# Patient Record
Sex: FEMALE | Race: BLACK OR AFRICAN AMERICAN | NOT HISPANIC OR LATINO | Employment: OTHER | URBAN - METROPOLITAN AREA
[De-identification: names, ages, dates, MRNs, and addresses within clinical notes are randomized per-mention and may not be internally consistent; named-entity substitution may affect disease eponyms.]

---

## 2018-02-19 ENCOUNTER — OFFICE VISIT CONVERTED (OUTPATIENT)
Dept: PULMONOLOGY | Facility: CLINIC | Age: 83
End: 2018-02-19
Attending: PHYSICIAN ASSISTANT

## 2018-03-01 ENCOUNTER — OFFICE VISIT CONVERTED (OUTPATIENT)
Dept: PULMONOLOGY | Facility: CLINIC | Age: 83
End: 2018-03-01
Attending: INTERNAL MEDICINE

## 2019-01-04 ENCOUNTER — OFFICE VISIT CONVERTED (OUTPATIENT)
Dept: PULMONOLOGY | Facility: CLINIC | Age: 84
End: 2019-01-04
Attending: INTERNAL MEDICINE

## 2019-03-04 ENCOUNTER — HOSPITAL ENCOUNTER (OUTPATIENT)
Dept: GENERAL RADIOLOGY | Facility: HOSPITAL | Age: 84
Discharge: HOME | End: 2019-03-04
Attending: INTERNAL MEDICINE

## 2019-08-29 ENCOUNTER — HOSPITAL ENCOUNTER (OUTPATIENT)
Dept: LAB | Facility: HOSPITAL | Age: 84
Discharge: HOME | End: 2019-08-29
Attending: INTERNAL MEDICINE

## 2019-08-29 ENCOUNTER — OFFICE VISIT CONVERTED (OUTPATIENT)
Dept: PULMONOLOGY | Facility: CLINIC | Age: 84
End: 2019-08-29
Attending: PHYSICIAN ASSISTANT

## 2019-08-29 LAB
ALBUMIN SERPL-MCNC: 4.3 G/DL (ref 3.5–5)
ALBUMIN/GLOB SERPL: 1.6 {RATIO} (ref 1.4–2.6)
ALP SERPL-CCNC: 78 U/L (ref 43–160)
ALT SERPL-CCNC: 10 U/L (ref 10–40)
ANION GAP SERPL CALC-SCNC: 19 MMOL/L (ref 8–19)
AST SERPL-CCNC: 12 U/L (ref 15–50)
BASOPHILS # BLD AUTO: 0.03 10*3/UL (ref 0–0.2)
BASOPHILS NFR BLD AUTO: 0.7 % (ref 0–3)
BILIRUB SERPL-MCNC: 0.62 MG/DL (ref 0.2–1.3)
BUN SERPL-MCNC: 22 MG/DL (ref 5–25)
BUN/CREAT SERPL: 33 {RATIO} (ref 6–20)
CALCIUM SERPL-MCNC: 9.7 MG/DL (ref 8.7–10.4)
CHLORIDE SERPL-SCNC: 103 MMOL/L (ref 99–111)
CHOLEST SERPL-MCNC: 145 MG/DL (ref 107–200)
CHOLEST/HDLC SERPL: 3.2 {RATIO} (ref 3–6)
CONV ABS IMM GRAN: 0.01 10*3/UL (ref 0–0.2)
CONV CO2: 25 MMOL/L (ref 22–32)
CONV CREATININE URINE, RANDOM: 73.4 MG/DL (ref 10–300)
CONV IMMATURE GRAN: 0.2 % (ref 0–1.8)
CONV MICROALBUM.,U,RANDOM: <12 MG/L (ref 0–20)
CONV TOTAL PROTEIN: 7 G/DL (ref 6.3–8.2)
CREAT UR-MCNC: 0.67 MG/DL (ref 0.5–0.9)
DEPRECATED RDW RBC AUTO: 50.6 FL (ref 36.4–46.3)
EOSINOPHIL # BLD AUTO: 0.18 10*3/UL (ref 0–0.7)
EOSINOPHIL # BLD AUTO: 4 % (ref 0–7)
ERYTHROCYTE [DISTWIDTH] IN BLOOD BY AUTOMATED COUNT: 14.3 % (ref 11.7–14.4)
EST. AVERAGE GLUCOSE BLD GHB EST-MCNC: 197 MG/DL
GFR SERPLBLD BASED ON 1.73 SQ M-ARVRAT: >60 ML/MIN/{1.73_M2}
GLOBULIN UR ELPH-MCNC: 2.7 G/DL (ref 2–3.5)
GLUCOSE SERPL-MCNC: 153 MG/DL (ref 65–99)
HBA1C MFR BLD: 8.5 % (ref 3.5–5.7)
HCT VFR BLD AUTO: 37 % (ref 37–47)
HDLC SERPL-MCNC: 46 MG/DL (ref 40–60)
HGB BLD-MCNC: 11.7 G/DL (ref 12–16)
LDLC SERPL CALC-MCNC: 83 MG/DL (ref 70–100)
LYMPHOCYTES # BLD AUTO: 1.13 10*3/UL (ref 1–5)
LYMPHOCYTES NFR BLD AUTO: 25.4 % (ref 20–45)
MCH RBC QN AUTO: 30.8 PG (ref 27–31)
MCHC RBC AUTO-ENTMCNC: 31.6 G/DL (ref 33–37)
MCV RBC AUTO: 97.4 FL (ref 81–99)
MICROALBUMIN/CREAT UR: 16.3 MG/G{CRE} (ref 0–35)
MONOCYTES # BLD AUTO: 0.58 10*3/UL (ref 0.2–1.2)
MONOCYTES NFR BLD AUTO: 13 % (ref 3–10)
NEUTROPHILS # BLD AUTO: 2.52 10*3/UL (ref 2–8)
NEUTROPHILS NFR BLD AUTO: 56.7 % (ref 30–85)
NRBC CBCN: 0 % (ref 0–0.7)
OSMOLALITY SERPL CALC.SUM OF ELEC: 302 MOSM/KG (ref 273–304)
PLATELET # BLD AUTO: 310 10*3/UL (ref 130–400)
PMV BLD AUTO: 9.9 FL (ref 9.4–12.3)
POTASSIUM SERPL-SCNC: 3.7 MMOL/L (ref 3.5–5.3)
RBC # BLD AUTO: 3.8 10*6/UL (ref 4.2–5.4)
SODIUM SERPL-SCNC: 143 MMOL/L (ref 135–147)
TRIGL SERPL-MCNC: 81 MG/DL (ref 40–150)
TSH SERPL-ACNC: 0.84 M[IU]/L (ref 0.27–4.2)
VLDLC SERPL-MCNC: 16 MG/DL (ref 5–37)
WBC # BLD AUTO: 4.45 10*3/UL (ref 4.8–10.8)

## 2019-11-11 ENCOUNTER — HOSPITAL ENCOUNTER (OUTPATIENT)
Dept: GENERAL RADIOLOGY | Facility: HOSPITAL | Age: 84
Discharge: HOME | End: 2019-11-11
Attending: INTERNAL MEDICINE

## 2019-12-12 ENCOUNTER — HOSPITAL ENCOUNTER (OUTPATIENT)
Dept: LAB | Facility: HOSPITAL | Age: 84
Discharge: HOME | End: 2019-12-12
Attending: INTERNAL MEDICINE

## 2019-12-12 LAB
CONV CREATININE URINE, RANDOM: 91.4 MG/DL (ref 10–300)
CONV MICROALBUM.,U,RANDOM: 21.8 MG/L (ref 0–20)
EST. AVERAGE GLUCOSE BLD GHB EST-MCNC: 177 MG/DL
HBA1C MFR BLD: 7.8 % (ref 3.5–5.7)
MICROALBUMIN/CREAT UR: 23.9 MG/G{CRE} (ref 0–35)

## 2020-01-08 ENCOUNTER — OFFICE VISIT CONVERTED (OUTPATIENT)
Dept: PULMONOLOGY | Facility: CLINIC | Age: 85
End: 2020-01-08
Attending: PHYSICIAN ASSISTANT

## 2020-06-11 ENCOUNTER — HOSPITAL ENCOUNTER (OUTPATIENT)
Dept: LAB | Facility: HOSPITAL | Age: 85
Discharge: HOME | End: 2020-06-11
Attending: INTERNAL MEDICINE

## 2020-06-11 LAB
ALBUMIN SERPL-MCNC: 4.4 G/DL (ref 3.5–5)
ALBUMIN/GLOB SERPL: 1.9 {RATIO} (ref 1.4–2.6)
ALP SERPL-CCNC: 74 U/L (ref 43–160)
ALT SERPL-CCNC: 10 U/L (ref 10–40)
ANION GAP SERPL CALC-SCNC: 14 MMOL/L (ref 8–19)
AST SERPL-CCNC: 12 U/L (ref 15–50)
BASOPHILS # BLD AUTO: 0.04 10*3/UL (ref 0–0.2)
BASOPHILS NFR BLD AUTO: 0.9 % (ref 0–3)
BILIRUB SERPL-MCNC: 0.19 MG/DL (ref 0.2–1.3)
BUN SERPL-MCNC: 18 MG/DL (ref 5–25)
BUN/CREAT SERPL: 31 {RATIO} (ref 6–20)
CALCIUM SERPL-MCNC: 9.5 MG/DL (ref 8.7–10.4)
CHLORIDE SERPL-SCNC: 105 MMOL/L (ref 99–111)
CHOLEST SERPL-MCNC: 139 MG/DL (ref 107–200)
CHOLEST/HDLC SERPL: 2.4 {RATIO} (ref 3–6)
CONV ABS IMM GRAN: 0.01 10*3/UL (ref 0–0.2)
CONV CO2: 25 MMOL/L (ref 22–32)
CONV CREATININE URINE, RANDOM: 99.3 MG/DL (ref 10–300)
CONV IMMATURE GRAN: 0.2 % (ref 0–1.8)
CONV MICROALBUM.,U,RANDOM: 25 MG/L (ref 0–20)
CONV TOTAL PROTEIN: 6.7 G/DL (ref 6.3–8.2)
CREAT UR-MCNC: 0.59 MG/DL (ref 0.5–0.9)
DEPRECATED RDW RBC AUTO: 54.3 FL (ref 36.4–46.3)
EOSINOPHIL # BLD AUTO: 0.22 10*3/UL (ref 0–0.7)
EOSINOPHIL # BLD AUTO: 5.1 % (ref 0–7)
ERYTHROCYTE [DISTWIDTH] IN BLOOD BY AUTOMATED COUNT: 15.7 % (ref 11.7–14.4)
ERYTHROCYTE [SEDIMENTATION RATE] IN BLOOD: 12 MM/H (ref 0–30)
EST. AVERAGE GLUCOSE BLD GHB EST-MCNC: 160 MG/DL
GFR SERPLBLD BASED ON 1.73 SQ M-ARVRAT: >60 ML/MIN/{1.73_M2}
GLOBULIN UR ELPH-MCNC: 2.3 G/DL (ref 2–3.5)
GLUCOSE SERPL-MCNC: 114 MG/DL (ref 65–99)
HBA1C MFR BLD: 7.2 % (ref 3.5–5.7)
HCT VFR BLD AUTO: 37.4 % (ref 37–47)
HDLC SERPL-MCNC: 58 MG/DL (ref 40–60)
HGB BLD-MCNC: 11.8 G/DL (ref 12–16)
LDLC SERPL CALC-MCNC: 67 MG/DL (ref 70–100)
LYMPHOCYTES # BLD AUTO: 1.55 10*3/UL (ref 1–5)
LYMPHOCYTES NFR BLD AUTO: 35.6 % (ref 20–45)
MCH RBC QN AUTO: 29.8 PG (ref 27–31)
MCHC RBC AUTO-ENTMCNC: 31.6 G/DL (ref 33–37)
MCV RBC AUTO: 94.4 FL (ref 81–99)
MICROALBUMIN/CREAT UR: 25.2 MG/G{CRE} (ref 0–35)
MONOCYTES # BLD AUTO: 0.45 10*3/UL (ref 0.2–1.2)
MONOCYTES NFR BLD AUTO: 10.3 % (ref 3–10)
NEUTROPHILS # BLD AUTO: 2.08 10*3/UL (ref 2–8)
NEUTROPHILS NFR BLD AUTO: 47.9 % (ref 30–85)
NRBC CBCN: 0 % (ref 0–0.7)
OSMOLALITY SERPL CALC.SUM OF ELEC: 293 MOSM/KG (ref 273–304)
PLATELET # BLD AUTO: 267 10*3/UL (ref 130–400)
PMV BLD AUTO: 10.7 FL (ref 9.4–12.3)
POTASSIUM SERPL-SCNC: 4.1 MMOL/L (ref 3.5–5.3)
PROT UR-MCNC: 10.1 MG/DL
RBC # BLD AUTO: 3.96 10*6/UL (ref 4.2–5.4)
SODIUM SERPL-SCNC: 140 MMOL/L (ref 135–147)
TRIGL SERPL-MCNC: 70 MG/DL (ref 40–150)
TSH SERPL-ACNC: 1.19 M[IU]/L (ref 0.27–4.2)
VLDLC SERPL-MCNC: 14 MG/DL (ref 5–37)
WBC # BLD AUTO: 4.35 10*3/UL (ref 4.8–10.8)

## 2020-09-17 ENCOUNTER — CONVERSION ENCOUNTER (OUTPATIENT)
Dept: PULMONOLOGY | Facility: CLINIC | Age: 85
End: 2020-09-17

## 2020-09-17 ENCOUNTER — HOSPITAL ENCOUNTER (OUTPATIENT)
Dept: GENERAL RADIOLOGY | Facility: HOSPITAL | Age: 85
Discharge: HOME | End: 2020-09-17
Attending: INTERNAL MEDICINE

## 2020-10-15 ENCOUNTER — CONVERSION ENCOUNTER (OUTPATIENT)
Dept: OTOLARYNGOLOGY | Facility: CLINIC | Age: 85
End: 2020-10-15
Attending: NURSE PRACTITIONER

## 2021-03-08 ENCOUNTER — HOSPITAL ENCOUNTER (OUTPATIENT)
Dept: VACCINE CLINIC | Facility: HOSPITAL | Age: 86
Discharge: HOME | End: 2021-03-08
Attending: INTERNAL MEDICINE

## 2021-03-17 ENCOUNTER — OFFICE VISIT CONVERTED (OUTPATIENT)
Dept: PULMONOLOGY | Facility: CLINIC | Age: 86
End: 2021-03-17
Attending: NURSE PRACTITIONER

## 2021-03-18 ENCOUNTER — HOSPITAL ENCOUNTER (OUTPATIENT)
Dept: LAB | Facility: HOSPITAL | Age: 86
Discharge: HOME | End: 2021-03-18
Attending: INTERNAL MEDICINE

## 2021-03-18 LAB
ALBUMIN SERPL-MCNC: 4.4 G/DL (ref 3.5–5)
ALBUMIN/GLOB SERPL: 1.7 {RATIO} (ref 1.4–2.6)
ALP SERPL-CCNC: 76 U/L (ref 43–160)
ALT SERPL-CCNC: 10 U/L (ref 10–40)
ANION GAP SERPL CALC-SCNC: 16 MMOL/L (ref 8–19)
AST SERPL-CCNC: 14 U/L (ref 15–50)
BASOPHILS # BLD AUTO: 0.03 10*3/UL (ref 0–0.2)
BASOPHILS NFR BLD AUTO: 0.8 % (ref 0–3)
BILIRUB SERPL-MCNC: 0.34 MG/DL (ref 0.2–1.3)
BUN SERPL-MCNC: 14 MG/DL (ref 5–25)
BUN/CREAT SERPL: 25 {RATIO} (ref 6–20)
CALCIUM SERPL-MCNC: 9.2 MG/DL (ref 8.7–10.4)
CHLORIDE SERPL-SCNC: 103 MMOL/L (ref 99–111)
CHOLEST SERPL-MCNC: 151 MG/DL (ref 107–200)
CHOLEST/HDLC SERPL: 2.4 {RATIO} (ref 3–6)
CONV ABS IMM GRAN: 0.01 10*3/UL (ref 0–0.2)
CONV CO2: 24 MMOL/L (ref 22–32)
CONV CREATININE URINE, RANDOM: 84.2 MG/DL (ref 10–300)
CONV IMMATURE GRAN: 0.3 % (ref 0–1.8)
CONV MICROALBUM.,U,RANDOM: <12 MG/L (ref 0–20)
CONV TOTAL PROTEIN: 7 G/DL (ref 6.3–8.2)
CREAT UR-MCNC: 0.55 MG/DL (ref 0.5–0.9)
DEPRECATED RDW RBC AUTO: 50.6 FL (ref 36.4–46.3)
EOSINOPHIL # BLD AUTO: 0.3 10*3/UL (ref 0–0.7)
EOSINOPHIL # BLD AUTO: 7.9 % (ref 0–7)
ERYTHROCYTE [DISTWIDTH] IN BLOOD BY AUTOMATED COUNT: 14.5 % (ref 11.7–14.4)
EST. AVERAGE GLUCOSE BLD GHB EST-MCNC: 131 MG/DL
GFR SERPLBLD BASED ON 1.73 SQ M-ARVRAT: >60 ML/MIN/{1.73_M2}
GLOBULIN UR ELPH-MCNC: 2.6 G/DL (ref 2–3.5)
GLUCOSE SERPL-MCNC: 95 MG/DL (ref 65–99)
HBA1C MFR BLD: 6.2 % (ref 3.5–5.7)
HCT VFR BLD AUTO: 37.3 % (ref 37–47)
HDLC SERPL-MCNC: 62 MG/DL (ref 40–60)
HGB BLD-MCNC: 12 G/DL (ref 12–16)
LDLC SERPL CALC-MCNC: 78 MG/DL (ref 70–100)
LYMPHOCYTES # BLD AUTO: 1.32 10*3/UL (ref 1–5)
LYMPHOCYTES NFR BLD AUTO: 34.9 % (ref 20–45)
MCH RBC QN AUTO: 30.3 PG (ref 27–31)
MCHC RBC AUTO-ENTMCNC: 32.2 G/DL (ref 33–37)
MCV RBC AUTO: 94.2 FL (ref 81–99)
MICROALBUMIN/CREAT UR: 14.3 MG/G{CRE} (ref 0–35)
MONOCYTES # BLD AUTO: 0.39 10*3/UL (ref 0.2–1.2)
MONOCYTES NFR BLD AUTO: 10.3 % (ref 3–10)
NEUTROPHILS # BLD AUTO: 1.73 10*3/UL (ref 2–8)
NEUTROPHILS NFR BLD AUTO: 45.8 % (ref 30–85)
NRBC CBCN: 0 % (ref 0–0.7)
OSMOLALITY SERPL CALC.SUM OF ELEC: 288 MOSM/KG (ref 273–304)
PLATELET # BLD AUTO: 246 10*3/UL (ref 130–400)
PMV BLD AUTO: 10.6 FL (ref 9.4–12.3)
POTASSIUM SERPL-SCNC: 4 MMOL/L (ref 3.5–5.3)
RBC # BLD AUTO: 3.96 10*6/UL (ref 4.2–5.4)
SODIUM SERPL-SCNC: 139 MMOL/L (ref 135–147)
TRIGL SERPL-MCNC: 53 MG/DL (ref 40–150)
TSH SERPL-ACNC: 1.03 M[IU]/L (ref 0.27–4.2)
VLDLC SERPL-MCNC: 11 MG/DL (ref 5–37)
WBC # BLD AUTO: 3.78 10*3/UL (ref 4.8–10.8)

## 2021-03-29 ENCOUNTER — HOSPITAL ENCOUNTER (OUTPATIENT)
Dept: VACCINE CLINIC | Facility: HOSPITAL | Age: 86
Discharge: HOME | End: 2021-03-29
Attending: INTERNAL MEDICINE

## 2021-05-10 NOTE — H&P
History and Physical      Patient Name: Annita Rodriguez   Patient ID: [id number]   Sex: Female   YOB: 1935    Primary Care Provider: Arias Beck MD   Referring Provider: Arias Beck MD    Visit Date: October 15, 2020    Provider: MORGAN Fuentes   Location: [facility]   Location Address: [address] [place] [address], [address] [place] [address]  [id number]   Location Phone: [phone]          Chief Complaint     1.  Tinnitus, left ear    2.  Hearing loss       History Of Present Illness  Annita Rodriguez is a 85 year old female who presents to the office today as a consult from Arias Beck MD.      She presents to the clinic today for evaluation of her left ear.  She reports over the past 3 months she has had a ringing sensation and muffled hearing in her left ear.  She states that the ringing bothers her worse when she lays down at night and she often feels like there is something in her ear.  She reports that she does not have any ear ringing on the right side.  She feels like she hears fairly well and is not having a whole lot of trouble on a day-to-day basis understanding.  She denies any otalgia, otorrhea or recurrent otitis media infections.  She states she has never had any surgeries on her ears or previous issues with her ears.  She denies any history of noise exposure.       Past Medical History  Tinnitus         Past Surgical History  *Denies any surgical procedures         Medication List  amlodipine 10 mg oral tablet; budesonide 0.5 mg/2 mL inhalation suspension for nebulization; folic acid 1 mg oral tablet; hydrochlorothiazide 12.5 mg oral capsule; hydroxychloroquine 200 mg oral tablet         Allergy List  NO KNOWN DRUG ALLERGIES         Family Medical History  Family history of stroke; Family history of heart disease; Family history of diabetes mellitus         Social History  Tobacco (Former)         Review of Systems  · Constitutional  o Denies  o : fever, night  "sweats, weight loss  · Eyes  o Denies  o : discharge from eye, impaired vision  · HENT  o Admits  o : *See HPI  · Cardiovascular  o Denies  o : chest pain, irregular heart beats  · Respiratory  o Denies  o : shortness of breath, wheezing, coughing up blood  · Gastrointestinal  o Denies  o : heartburn, reflux, vomiting blood  · Genitourinary  o Denies  o : frequency  · Integument  o Denies  o : rash, skin dryness  · Neurologic  o Denies  o : seizures, loss of balance, loss of consciousness, dizziness  · Endocrine  o Admits  o : cold intolerance  o Denies  o : heat intolerance  · Heme-Lymph  o Denies  o : easy bleeding, anemia      Vitals  Date Time BP Position Site L\R Cuff Size HR RR TEMP (F) WT  HT  BMI kg/m2 BSA m2 O2 Sat FR L/min FiO2 HC       10/15/2020 10:29 AM        97.3 127lbs 7oz 5'  3\" 22.57 1.6             Physical Examination  · Constitutional  o Appearance  o : well developed, well-nourished, alert and in no acute distress, voice clear and strong  · Head and Face  o Head  o :   § Inspection  § : no deformities or lesions  o Face  o :   § Inspection  § : No facial lesions; House-Brackmann I/VI bilaterally  § Palpation  § : No TMJ crepitus nor  muscle tenderness bilaterally  · Eyes  o Vision  o :   § Visual Fields  § : Extraocular movements are intact. No spontaneous or gaze-induced nystagmus.  o Conjunctivae  o : clear  o Sclerae  o : clear  o Pupils and Irises  o : pupils equal, round, and reactive to light.   · Ears, Nose, Mouth and Throat  o Ears  o :   § External Ears  § : appearance within normal limits, no lesions present  § Otoscopic Examination  § : tympanic membrane appearance within normal limits bilaterally without perforations, well-aerated middle ears  § Hearing  § : intact to conversational voice both ears. Tuning fork testing: Billingsley unable to hear. Rinne positive bilaterally  o Nose  o :   § External Nose  § : appearance normal  § Intranasal Exam  § : mucosa within normal limits, " vestibules normal, no intranasal lesions present, septum midline, sinuses non tender to percussion  o Oral Cavity  o :   § Oral Mucosa  § : oral mucosa normal without pallor or cyanosis  § Lips  § : lip appearance normal  § Teeth  § : Edentulous  § Gums  § : gums pink, non-swollen, no bleeding present  § Tongue  § : tongue appearance normal; normal mobility  § Palate  § : hard palate normal, soft palate appearance normal with symmetric mobility  o Throat  o :   § Oropharynx  § : no inflammation or lesions present, tonsils within normal limits  · Neck  o Inspection/Palpation  o : normal appearance, no masses or tenderness, trachea midline; thyroid size normal, nontender, no nodules or masses present on palpation  · Respiratory  o Respiratory Effort  o : breathing unlabored  o Inspection of Chest  o : normal appearance, no retractions  · Lymphatic  o Neck  o : no lymphadenopathy present  o Supraclavicular Nodes  o : no lymphadenopathy present  o Preauricular Nodes  o : no lymphadenopathy present  · Skin and Subcutaneous Tissue  o General Inspection  o : Regarding face and neck - there are no rashes present, no lesions present, and no areas of discoloration  · Neurologic  o Cranial Nerves  o : cranial nerves II-XII are grossly intact bilaterally  o Gait and Station  o : normal gait, able to stand without diffculty  · Psychiatric  o Judgement and Insight  o : judgment and insight intact  o Mood and Affect  o : mood normal, affect appropriate          Assessment  · Sensorineural hearing loss (SNHL), bilateral     389.18/H90.3  · Tinnitus, bilateral     388.30/H93.13      Plan  · Orders  o Audiometry, pure-tone (threshold); [facility] (27447) - 389.18/H90.3, 388.30/H93.13 - 10/15/2020  o Tympanogram (Impedance Testing) [facility] (65703) - 389.18/H90.3, 388.30/H93.13 - 10/15/2020  · Medications  o Medications have been Reconciled  o Transition of Care or Provider Policy  · Instructions  o She presents to the clinic today for  evaluation of her left ear. She reports over the past 3 months she has had a ringing sensation and muffled hearing in her left ear. She states that the ringing bothers her worse when she lays down at night and she often feels like there is something in her ear. She reports that she does not have any ear ringing on the right side. She feels like she hears fairly well and is not having a whole lot of trouble on a day-to-day basis understanding. She denies any otalgia, otorrhea or recurrent otitis media infections. She states she has never had any surgeries on her ears or previous issues with her ears. She denies any history of noise exposure. On examination today bilateral external auditory canals and bilateral tympanic membrane appearance is within normal limits. Middle ears are well aerated there are no perforations. Tuning fork testing was performed. She was unable to hear the tuning fork during Billingsley testing. Rinne was positive bilaterally. We did obtain an audiogram and tympanogram. Audiogram shows bilateral mild sloping to severe sensorineural loss. Speech reception threshold was at 40 dB bilaterally. Word discrimination scores were 80% on the right and 76% on the left at 70 dB. Tympanograms were within normal limits bilaterally. I gone over the results of the audiogram with the patient and also given her copy. I discussed with her that her high-frequency sensorineural hearing loss is causing the tinnitus in her left ear. Auscultated around the ear and on the neck to listen for any turbulent blood flow but was able to appreciate any abnormality. I will have her start using background noise as a distraction technique to help her with the tinnitus when she lays down at night. I have reassured her that her ears are completely normal on exam today. I will plan to see her back on an as-needed basis.  o Electronically Identified Patient Education Materials Provided Electronically  · Correspondence  o ENT Letter to  Referring MD (Arias Beck MD) - 10/15/2020            Electronically Signed by: MORGAN Fuentes -Author on October 15, 2020 12:07:11 PM

## 2021-05-14 VITALS — HEIGHT: 63 IN | BODY MASS INDEX: 22.58 KG/M2 | TEMPERATURE: 97.3 F | WEIGHT: 127.44 LBS

## 2021-05-28 VITALS
SYSTOLIC BLOOD PRESSURE: 144 MMHG | RESPIRATION RATE: 18 BRPM | TEMPERATURE: 98.4 F | HEART RATE: 105 BPM | TEMPERATURE: 98.8 F | RESPIRATION RATE: 18 BRPM | SYSTOLIC BLOOD PRESSURE: 137 MMHG | RESPIRATION RATE: 13 BRPM | WEIGHT: 140 LBS | OXYGEN SATURATION: 96 % | OXYGEN SATURATION: 94 % | DIASTOLIC BLOOD PRESSURE: 68 MMHG | WEIGHT: 134.25 LBS | HEART RATE: 75 BPM | OXYGEN SATURATION: 100 % | BODY MASS INDEX: 24.7 KG/M2 | TEMPERATURE: 98.2 F | WEIGHT: 140.44 LBS | HEIGHT: 62 IN | HEIGHT: 63 IN | HEART RATE: 105 BPM | HEIGHT: 63 IN | DIASTOLIC BLOOD PRESSURE: 123 MMHG | SYSTOLIC BLOOD PRESSURE: 153 MMHG | BODY MASS INDEX: 24.88 KG/M2 | DIASTOLIC BLOOD PRESSURE: 71 MMHG | BODY MASS INDEX: 24.8 KG/M2

## 2021-05-28 VITALS
OXYGEN SATURATION: 99 % | BODY MASS INDEX: 24.8 KG/M2 | WEIGHT: 140 LBS | TEMPERATURE: 98.3 F | DIASTOLIC BLOOD PRESSURE: 88 MMHG | HEIGHT: 63 IN | RESPIRATION RATE: 16 BRPM | OXYGEN SATURATION: 98 % | SYSTOLIC BLOOD PRESSURE: 160 MMHG | HEART RATE: 92 BPM | HEART RATE: 93 BPM | RESPIRATION RATE: 16 BRPM | TEMPERATURE: 98.2 F | DIASTOLIC BLOOD PRESSURE: 84 MMHG | BODY MASS INDEX: 24.8 KG/M2 | WEIGHT: 140 LBS | HEIGHT: 63 IN | SYSTOLIC BLOOD PRESSURE: 172 MMHG

## 2021-05-28 VITALS
HEART RATE: 78 BPM | TEMPERATURE: 97.1 F | HEIGHT: 63 IN | RESPIRATION RATE: 12 BRPM | OXYGEN SATURATION: 100 % | SYSTOLIC BLOOD PRESSURE: 134 MMHG | DIASTOLIC BLOOD PRESSURE: 62 MMHG | BODY MASS INDEX: 23.05 KG/M2 | WEIGHT: 130.12 LBS

## 2021-05-28 NOTE — PROGRESS NOTES
Patient: OPAL MCKEON     Acct: [id number]     Report: #[id number]  UNIT #: [id number]     : 1935    Encounter Date:2019  PRIMARY CARE: Arias Beck  ***Signed***  --------------------------------------------------------------------------------------------------------------------  Chief Complaint      Encounter Date      Aug 29, 2019            Primary Care Provider      Arias Beck            Referring Provider      FABIOLA SMITH            Patient Complaint      Patient is complaining of      Patient here today for follow up            VITALS      Height 63 in / 160.02 cm      Weight 140 lbs  / 63.972925 kg      BSA 1.66 m2      BMI 24.8 kg/m2      Temperature 98.2 F / 36.78 C - Oral      Pulse 75      Respirations 13      Blood Pressure 137/68 Sitting, Right Arm      Pulse Oximetry 100%, room air      Initial Exhaled Nitrous Oxide      Exhaled Nitrous Oxide Results:  73            HPI      The patient is a very pleasant 84 year old  female patient of     Dr. Bill's last seen by him in 2019. She has a history of obstructive     sleep apnea on nightly CPAP, history of moderate asthma chronic obstructive     pulmonary disease overlap syndrome. She has been doing quite well since her last    office visit and is using Brovana and Pulmicort nebulizers twice daily and     Spiriva Respimat 2.5 mcg. She feels that all of these help her. She denies any     increased increased dyspnea, coughing or wheezing, hemoptysis, fever or chills.     She has a history of rheumatoid arthritis and is seeing a rheumatologist Dr. Geni Gutierrez in [place] and is on methotrexate. She recently took some extra     prednisone for increased arthritis pain in her hands and tells me it is now     improved and she was able to come off the prednisone.             I reviewed her Review of Systems, medical, surgical and family history and agree    with those as entered.      Copies To:    Golden Bill ;            GINO      Constitutional:  Denies: Fatigue, Fever, Weight gain, Weight loss, Chills,     Insomnia, Other      Respiratory/Breathing:  Denies: Shortness of air, Wheezing, Cough, Hemoptysis,     Pleuritic pain, Other      Endocrine:  Denies: Polydipsia, Polyuria, Heat/cold intolerance, Abnorml     menstrual pattern, Diabetes, Other      Eyes:  Denies: Blurred vision, Vision Changes, Other      Ears, nose, mouth, throat:  Denies: Congestion, Dysphagia, Hearing Changes, Nose    Bleeding, Nasal Discharge, Throat pain, Tinnitus, Other      Cardiovascular:  Denies: Chest Pain, Exertional dyspnea, Peripheral Edema,     Palpitations, Syncope, Wake up Gasping for air, Orthopnea, Tachycardia, Other      Gastrointestinal:  Denies: Abdominal pain/cramping, Bloody stools, Constipation,    Diarrhea, Melena, Nausea, Vomiting, Other      Genitourinary:  Denies: Dysuria, Urinary frequency, Incontinence, Hematuria,     Urgency, Other      Musculoskeletal:  Denies: Joint Pain, Joint Stiffness, Joint Swelling, Myalgias,    Other      Hematologic/lymphatic:  DENIES: Lymphadenopathy, Bruising, Bleeding tendencies,     Other      Neurologic:  Denies: Headache, Numbness, Weakness, Seizures, Other      Psychiatric:  Denies: Anxiety, Appropriate Effect, Depression, Other      Sleep:  No: Excessive daytime sleep, Morning Headache?, Snoring, Insomnia?, Stop    breathing at sleep?, Other      Integumentary:  Denies: Rash, Dry skin, Skin Warm to Touch, Other            FAMILY/SOCIAL/MEDICAL HX      Surgical History:  Yes: Bowel Surgery (COLONOSCOPY), Cholecystectomy (OPEN),     Vascular Surgery (VEIN STRIPPING); No: AAA Repair, Abdominal Surgery,     Angioplasty, Appendectomy, Back Surgery, Bladder Surgery, Breast Surgery, CABG,     Carotid Stenosis, Ear Surgery, Eye Surgery, Head Surgery, Hernia Surgery, Kidney    Surgery, Nose Surgery, Oral Surgery, Orthopedic Surgery, Prostatectomy, Rectal     Surgery, Spinal Surgery,  "Testicular Surgery, Throat Surgery, Valve Replacement,     Other Surgeries      Stroke - Family Hx:  Father      Heart - Family Hx:  Father      Diabetes - Family Hx:  Father      Cancer/Type - Family Hx:  Child      Is Father Still Living?:  No      Is Mother Still Living?:  No       Family History:  Yes      Social History:  No Tobacco Use, No Alcohol Use, No Recreational Drug use      Smoking status:  Former smoker (A few a day for 30 years quit for 1 year )      Anticoagulation Therapy:  No      Antibiotic Prophylaxis:  No      Medical History:  Yes: Arthritis (R KNEE), Asthma, Diabetes (ORAL AND INSULIN,     TYPE II), Hemorrhoids/Rectal Prob (DIARRHEA FROM METFORMIN SOMETIMES, BELCHING     AT TIMES), High Blood Pressure (MED CONTROLS \"MOST OF THE TIME\"), Reflux     Disease, Shortness Of Breath; No: Alcoholism, Allergies, Anemia, Blood Disease,     Broken Bones, Cataracts, Chemical Dependency, Chemotherapy/Cancer, Chronic     Bronchitis/COPD, Emphysema, Chronic Liver Disease, Colon Trouble, Colitis,     Diverticulitis, Congestive Heart Failu, Deafness or Ringing Ears, Convulsions,     Depression, Anxiety, Bipolar Disorder, Epilepsy, Seizures, Forgetfullness,     Glaucoma, Gall Stones, Gout, Head Injury, Heart Attack, Heart Murmur, Hepatitis,    Hiatal Hernia, High Cholesterol, HIV (Do not ask - volu, Jaundice, Kidney or     Bladder Disease, Kidney Stones, Migrane Headaches, Mitral Valve Prolapse, Night     sweats, Phlebitis, Psychiatric Care, Rheumatic Fever, Sexually Transmitted Dis,     Sinus Trouble, Skin Disease/Psoriais/Ecz, Stroke, Thyroid Problem, Tuberculosis     or Pos TB Te, Miscellaneous Medical/oth      Psychiatric History      none            PREVENTION      Hx Influenza Vaccination:  No      Date Influenza Vaccine Given:  Jan 1, 2018      Influenza Vaccine Declined:  Yes      2 or More Falls Past Year?:  No      Fall Past Year with Injury?:  No      Hx Pneumococcal Vaccination:  Yes      Encouraged " to follow-up with:  PCP regarding preventative exams.      Chart initiated by      Raissa Pineda CMA            ALLERGIES/MEDICATIONS      Allergies:        Coded Allergies:             SULFA (SULFONAMIDE ANTIBIOTICS) (Verified  Allergy, Unknown, UNKNOWN,     8/29/19)      Medications    Last Reconciled on 8/29/19 08:46 by SB MINER      amLODIPine (amLODIPine) 10 Mg Tablet      10 MG PO HS, #30 TAB 0 Refills         Reported         8/29/19       Folic Acid (Folic Acid*) 1 Mg Tablet      1 MG PO QDAY, #30 TAB 0 Refills         Reported         8/29/19       Benzonatate (Tessalon Perles) 100 Mg Cap      100 MG PO TID, #120 CAP 1 Refill         Prov: Golden Bill         1/4/19       CPAP Compressor (CPAP) 1 Each Each      EACH XX ONCE, #1 0 Refills         Prov: Golden Bill         1/4/19       Tiotropium Bromide (Spiriva Respimat 2.5 mcg/Puff) 4 Gm Mist.inhal      2 PUFFS INH RTQDAY, #1 MDI 9 Refills         Prov: Golden Bill         1/4/19       Arformoterol Tartrate (Brovana) 15 Mcg/2 Ml Vial.neb      15 MCG INH RTQ12H, #180 NEB 3 Refills         Prov: Golden Bill         6/20/18       Neb-Budesonide (Pulmicort) 0.5 Mg/2 Ml Ampul.neb      0.5 MG INH RTQ12H for 30 Days, #180 NEB 3 Refills         Prov: Golden Bill         6/20/18       Albuterol/Ipratropium (Duoneb) 3 Ml Ampul.neb      3 ML INH RTQID, #90 NEB 4 Refills         Prov: Nuris Martinez PA-C         5/23/18       Montelukast Sodium (Montelukast*) 10 Mg Tablet      10 MG PO HS, #30 TAB 6 Refills         Prov: Nuris Martinez PA-C         2/19/18       Losartan/Hydrochlorothiazide (Losartan/Hctz 100/12.5 Mg) 1 Each Tablet      1 TAB PO QDAY, #30 TAB 0 Refills         Reported         1/18/18       Albuterol (Proair HFA) Unknown Strength Inh      INH RTQ4H, #1 INH 0 Refills         Reported         1/16/18       Insulin Detemir (Levemir*) 100 Units/Ml Vial      6 UNITS SUBQ ERICA EA         Reported         1/14/13       Hydrochlorothiazide  (Hydrochlorothiazide*) 12.5 Mg Capsule      12.5 MG PO QDAY         Reported         1/14/13       Metformin Hcl (Metformin Hcl) 500 Mg Tablet      500 MG PO BID, TAB         Reported         1/14/13      Current Medications      Current Medications Reviewed 8/29/19            EXAM      CONSTITUTIONAL: Pleasant  normal conversant.       EYES : Pink conjunctive, no ptosis, PERRL.       ENMT : Nose and ears appear normal, normal dentition, mild posterior pharyngeal     wall erythema, no sinus tenderness. Mallampati classification       Neck: Nontender, no masses, no thyromegaly, no nodules.      Resp : Mildly decreased breath sounds throughout, no wheezes, rhonchi or     crackles, normal work of breathing noted.        CVS  : No carotid bruits, s1s2 nl, RRR, no murmur, rubs or gallop, no peripheral    edema       Chest wall: Normal rise with inspiration, nontender on palpation.      GI   : Abdomen soft, with no masses, no hepatosplenomegaly, no hernias, BS+      MSK  : Normal gait and station, no digital cyanosis or clubbing       Skin : No rashes, ulcerations or lesions, normal turgor and temperature      Neuro: CN II - XII intact, no sensory deficits, DTRs intact and symmetrical, no     motor weakness      Psych: Appropriate affect, A   Vitals      Vitals:             Height 63 in / 160.02 cm           Weight 140 lbs  / 63.831878 kg           BSA 1.66 m2           BMI 24.8 kg/m2           Temperature 98.2 F / 36.78 C - Oral           Pulse 75           Respirations 13           Blood Pressure 137/68 Sitting, Right Arm           Pulse Oximetry 100%, room air            REVIEW      Results Reviewed      PCCS Results Reviewed?:  Yes Prev Lab Results, Yes Prev Radiology Results, Yes     [facility]            Assessment      Notes      New Medications      * Folic Acid (Folic Acid*) 1 MG TABLET: 1 MG PO QDAY #30      * amLODIPine 10 MG TABLET: 10 MG PO HS #30      * Methotrexate Sodium (Methotrexate) Unknown  Strength TAB: PO Fr      * MDI-Albuterol (Proair HFA) 8.5 GM HFA.AER.AD: 1 PUFFS INH Q4-6H PRN SHORTNESS       OF BREATH #3      * PANTOPRAZOLE (Protonix*) 40 MG TABLET.DR: 40 MG PO QDAY #90         Instructions: Take on an empty stomach.      Renewed Medications      * Neb-Budesonide (Pulmicort) 0.5 MG/2 ML AMPUL.NEB: 0.5 MG INH RTQ12H 30 Days       #180         Instructions: DX: J45.901,J45.50 NPI: [id number]         Dx: Asthma with exacerbation - J45.901      * ARFORMOTEROL TARTRATE (Brovana) 15 MCG/2 ML VIAL.NEB: 15 MCG INH RTQ12H #180         Instructions: DX: J45.901,J45.50 NPI: [id number]         Dx: Asthma with exacerbation - J45.901      * TIOTROPIUM BROMIDE (Spiriva Respimat 2.5 mcg/Puff) 4 GM MIST.INHAL:         From: 2 PUFFS INH RTQDAY #1         To: 2 PUFFS INH RTQDAY #3      Discontinued Medications      * ALPRAZOLAM (Alprazolam) 0.25 MG TABLET: 0.125 MG PO Q8H PRN ANXIETY #10      * PANTOPRAZOLE (Protonix*) 40 MG TABLET.DR: 40 MG PO HS #30         Instructions: Take on an empty stomach.         Dx: Asthma with exacerbation - J45.901      * CETIRIZINE HCL (ZyrTEC) 10 MG TABLET: 10 MG PO HS #30      * predniSONE* 20 MG TABLET: 40 MG PO QDAY #10      ASSESSMENT:      1. Asthma/chronic obstructive pulmonary disease overlap without acute     exacerbation.       2. Obstructive sleep apnea on nightly CPAP.      3. Tobacco abuse of cigarettes in remission.      4. Gastroesophageal reflux disease.      5.  Rheumatoid arthritis followed by Dr. Gutierrez on methotrexate.            PLAN:      1. I will continue the patient on Brovana and Pulmicort nebulizers twice daily,     Spiriva Respimat 2.5 mcg 2 puffs once daily and albuterol or DuoNeb as needed.      I have sent refills for all of these today.       2. I refilled Protonix and she should continue this for gastroesophageal reflux     disease. She feels that is well controlled on this medicine.       3. Continue nightly CPAP and I will review CPAP compliance  data once it is     available.       4. Continue to follow up with her rheumatologist for her rheumatoid arthritis.     This is improved with recent medication adjustments she has had.  She is now on     methotrexate and folic acid.       5. She is up to date on her pneumonia vaccinations and flu when it is available     in fall 2019.         6. Follow up in 6 months with Dr. Bill sooner if needed.            Patient Education      Patient Education Provided:  COPD, How to use an Inhaler, How to use a     Nebulizer, Sleep Apnea      Time Spent:  > 50% /[facility]                 Disclaimer: Converted document may not contain table formatting or lab diagrams. Please see [facility] for the authenticated document.

## 2021-05-28 NOTE — PROGRESS NOTES
Patient: OPAL MCKEON     Acct: [id number]     Report: #[id number]  UNIT #: [id number]     : 1935    Encounter Date:2018  PRIMARY CARE: Arias Beck  ***Signed***  --------------------------------------------------------------------------------------------------------------------  Chief Complaint      Encounter Date      2018            Referring Provider      FABIOLA TOURE            Patient Complaint      Patient is complaining of      New pt here for [facility]            VITALS      Height 5 ft 3 in / 160.02 cm      Weight 140 lbs 7 oz / 63.986913 kg      BSA 1.69 m2      BMI 24.9 kg/m2      Temperature 98.4 F / 36.89 C - Oral      Pulse 105      Respirations 18      Blood Pressure 153/123 Sitting, Right Arm      Pulse Oximetry 96%, room air            HPI      The patient is a very pleasant 82 year old   female who     presents with her daughter today to the office for hospital follow up and due     to persistent dry cough, wheezing and dyspnea on exertion. She was admitted on     2018 and seen by Dr. Bill and Dr. Toure for acute respiratory failure    , noted to have an acute asthma exacerbation +/- possible chronic obstructive     pulmonary disease. She was treated with nebulizers, steroids and antibiotics     and her symptoms improved but she is still having persistent dry cough,     sometimes worse at night with wheezing that is also worse at night. She also     has dyspnea, cough and wheezing on exertion typically with minimal exertion     such as walking around her home and doing daily activities. It is relieved with     rest. She also complains of associated chest tightness. She denies any     productive cough, fevers or chills, nausea and vomiting. She snores at night     and reports that she feels tired and dozes off frequently during the day. She     has never been tested for obstructive sleep apnea before.             She has a remote former  tobacco abuse history but was never a heavy smoker. As     she teenager/young adult she smoked 1 pack of cigarettes per week for about 10-    20 years at the most. She has not smoked for 50+ years now.             She is currently using a ProAir rescue inhaler about every 4 hours and DuoNebs     several times per day. She is not using any other breathing treatments or     scheduled inhalers. She is also taking Singulair, Zyrtec and Protonix since her     hospital discharge and feels they are helping somewhat.             I have personally reviewed the Review of Systems, past family, social, surgical     and medical histories and I agree with the findings.            ROS      Constitutional:  Denies: Fatigue, Fever, Weight gain, Weight loss, Chills,     Insomnia, Other      Respiratory/Breathing:  Complains of: Shortness of air, Wheezing, Cough, Denies    : Hemoptysis, Pleuritic pain, Other      Endocrine:  Denies: Polydipsia, Polyuria, Heat/cold intolerance, Abnorml     menstrual pattern, Diabetes, Other      Eyes:  Denies: Blurred vision, Vision Changes, Other      Ears, nose, mouth, throat:  Denies: Mouth lesions, Thrush, Throat pain,     Hoarseness, Allergies/Hay Fever, Post Nasal Drip, Headaches, Recent Head Injury    , Nose Bleeding, Neck Stiffness, Thyroid Mass, Hearing Loss, Ear Fullness, Dry     Mouth, Nasal or Sinus Pain, Dry Lips, Nasal discharge, Nasal congestion, Other      Cardiovascular:  Denies: Palpitations, Syncope, Claudication, Chest Pain, Wake     up Gasping for air, Leg Swelling, Irregular Heart Rate, Cyanosis, Dyspnea on     Exertion, Other      Gastrointestinal:  Denies: Nausea, Constipation, Diarrhea, Abdominal pain,     Vomiting, Difficulty Swallowing, Reflux/Heartburn, Dysphagia, Jaundice, Bloating    , Melena, Bloody stools, Other      Genitourinary:  Denies: Urinary frequency, Incontinence, Hematuria, Urgency,     Nocturia, Dysuria, Testicular problems, Other      Musculoskeletal:   "Denies: Joint Pain, Joint Stiffness, Joint Swelling, Myalgias    , Other      Hematologic/lymphatic:  DENIES: Lymphadenopathy, Bruising, Bleeding tendencies,     Other      Neurological:  Denies: Headache, Numbness, Weakness, Seizures, Other      Psychiatric:  Denies: Anxiety, Appropriate Effect, Depression, Other      Sleep:  No: Excessive daytime sleep, Morning Headache?, Snoring, Insomnia?,     Stop breathing at sleep?, Other      Integumentary:  Denies: Rash, Dry skin, Skin Warm to Touch, Other      Immunologic/Allergic:  Denies: Latex allergy, Seasonal allergies, Asthma,     Urticaria, Eczema, Other      Immunization status:  No: Up to date            FAMILY/SOCIAL/MEDICAL HX      Surgical History:  Yes: Bowel Surgery (COLONOSCOPY), Cholecystectomy (OPEN),     Vascular Surgery (VEIN STRIPPING)      Stroke - Family Hx:  Father      Heart - Family Hx:  Father      Diabetes - Family Hx:  Father      Cancer/Type - Family Hx:  Child      Is Father Still Living?:  No      Is Mother Still Living?:  No      Smoking status:  Former smoker (A few a day x 30)      Anticoagulation Therapy:  No      Antibiotic Prophylaxis:  No      Medical History:  Yes: Arthritis (R KNEE), Asthma, Diabetes (ORAL AND INSULIN,     TYPE II), Hemorrhoids/Rectal Prob (DIARRHEA FROM METFORMIN SOMETIMES, BELCHING     AT TIMES), High Blood Pressure (MED CONTROLS \"MOST OF THE TIME\"), Reflux Disease    , Shortness Of Breath, No: Blood Disease, Deafness or Ringing Ears,     Miscellaneous Medical/oth            Hx Influenza Vaccination:  Yes      Date Influenza Vaccine Given:  Jan 1, 2018      Influenza Vaccine Declined:  No      2 or More Falls Past Year?:  No      Fall Past Year with Injury?:  No      Hx Pneumococcal Vaccination:  Yes      Encouraged to follow-up with:  PCP regarding preventative exams.      Chart initiated by      Mariposa Reddy MA            ALLERGIES/MEDICATIONS      Allergies:        Coded Allergies:             SULFA (SULFONAMIDE " ANTIBIOTICS) (Verified  Allergy, Unknown, UNKNOWN, 2/19/ 18)      Medications    Last Reconciled on 2/19/18 10:26 by LOW POSADA-Budesonide (Pulmicort) 0.5 Mg/2 Ml Ampul.neb      0.5 MG INH RTQ12H, #60 NEB 4 Refills         Prov: Stefanie Reyna PA-C         2/19/18       Arformoterol Tartrate (Brovana) 15 Mcg/2 Ml Vial.neb      15 MCG INH RTQ12H, #60 NEB 4 Refills         Prov: Stefanie Reyna PA-C         2/19/18       Benzonatate (Tessalon Perles) 100 Mg Cap      100 MG PO Q4H Y for COUGH, #100 CAP 1 Refill         Prov: Stefanie Reyna PA-C         2/19/18       Cetirizine Hcl (ZyrTEC*) 10 Mg Tablet      10 MG PO HS, #30 TAB 6 Refills         Prov: Stefanie Reyna PA-C         2/19/18       Pantoprazole (Protonix*) 40 Mg Tablet.dr      40 MG PO HS, #30 TAB 6 Refills         Prov: Stefanie Reyna PA-C         2/19/18       Montelukast Sodium (Montelukast*) 10 Mg Tablet      10 MG PO HS, #30 TAB 6 Refills         Prov: Stefanie Reyna PA-C         2/19/18       Albuterol/Ipratropium (Duoneb) 3 Ml Ampul.neb      3 ML INH RTQID, #90 NEB 4 Refills         Prov: Stefanie Reyna PA-C         2/19/18       Alprazolam (Alprazolam) 0.25 Mg Tablet      0.125 MG PO Q8H Y for ANXIETY, #10 TAB         Prov: ANA LUISA LONGORIA         1/21/18       Losartan/Hydrochlorothiazide (Losartan/Hctz 100/12.5 Mg) 1 Each Tablet      1 TAB PO QDAY, #30 TAB 0 Refills         Reported         1/18/18       Albuterol (Proair HFA*) Unknown Strength Inh      INH RTQ4H, #1 INH 0 Refills         Reported         1/16/18       Insulin Detemir (Levemir*) 100 Units/Ml Vial      6 UNITS SUBQ HS, EA         Reported         1/14/13       Hydrochlorothiazide (Hydrochlorothiazide*) 12.5 Mg Capsule      12.5 MG PO QDAY         Reported         1/14/13       Metformin Hcl (Metformin Hcl*) 500 Mg Tablet      500 MG PO BID, TAB         Reported         1/14/13      Current Medications      Current Medications Reviewed 2/19/18            EXAM             CONSTITUTIONAL: Pleasant female,  normal conversant.       EYES : Pink conjunctive, no ptosis, PERRL.       ENMT : Nose and ears appear normal, normal dentition, mild posterior pharyngeal     wall erythema. Mallampati classification       Neck: Nontender, no masses, no thyromegaly, no nodules.      Resp : Decreased breath sounds throughout with expiratory wheezing that is soft     and scattered throughout the lung fields.        CVS  : No carotid bruits, s1s2 nl, RRR, no murmur, rubs or gallop, no     peripheral edema       Chest wall: Normal rise with inspiration, nontender on palpation      GI   : Abdomen soft, with no masses, no hepatosplenomegaly, no hernias, BS+      MSK  : Normal gait and station, no digital cyanosis or clubbing       Skin : No rashes, ulcerations or lesions, normal turgor and temperature      Neuro: CN II - XII intact, no sensory deficits, DTRs intact and symmetrical, no     motor weakness      Psych: Appropriate affect, A   Vtials      Vitals:             Height 5 ft 3 in / 160.02 cm           Weight 140 lbs 7 oz / 63.167383 kg           BSA 1.69 m2           BMI 24.9 kg/m2           Temperature 98.4 F / 36.89 C - Oral           Pulse 105           Respirations 18           Blood Pressure 153/123 Sitting, Right Arm           Pulse Oximetry 96%, room air            REVIEW      Results Reviewed      PCCS Results Reviewed?:  Yes Prev Lab Results, Yes Prev Radiology Results, Yes     Previous [facility] Records (I personally reviewed the patient's previous hospital     notes as well as labs and imaging. )      Radiographic Results               [facility]                PACS RADIOLOGY REPORT            Patient: OPAL MCKEON   Acct: #[id number]   Report: #[id number]            UNIT #: [id number]    DOS: 18 0156      INSURANCE:MEDICARE PART A   LOCATION:ER     : 1935            PROVIDERS      ADMITTING:     ATTENDING:        FAMILY:  Arias Beck   ORDERING:  Antwon Sandoval         OTHER:    DICTATING:  Tristen Martinez MD            REQ #:[id number]   EXAM:W - CT CHEST with CONTRAST      REASON FOR EXAM:  Shortness of Breath      REASON FOR VISIT:  ASTHMA ATTACK            *******Signed******         PROCEDURE:   CT CHEST W/ CONTRAST             COMPARISON:   [facility], CT, CT CHEST ABD PEL WO CONTRAST    , 7/29/2016, 10:12.        [facility], CT, CHEST W/ CONTRAST, 1/30/2017, 7:48.             INDICATIONS:   DYSPNEA, COUGH.             TECHNIQUE:   After obtaining the patient's consent, CT images were obtained     with non-ionic       intravenous contrast material.               PROTOCOL:     Pulmonary embolism imaging protocol performed                RADIATION:     DLP: 461.4mGy*cm          Automated exposure control was utilized to minimize radiation dose.       CONTRAST:   60 cc Isovue 370 I.V.             FINDINGS:      No significant focal filling defects are observed to indicate the presence of     pulmonary embolism.             Mild to moderate breathing motion artifact is seen bilaterally. Scattered     atelectasis is noted. No       well-defined consolidations or significant pleural effusions are observed. No     dominant pulmonary       nodules or abnormal pulmonary masses are seen. The evaluation for subtle     pulmonary nodules is       limited due to breathing motion artifact.             No significant hilar, mediastinal, or axillary lymphadenopathy is seen. A     normal aortic arch       branching pattern is noted. The thyroid gland is unremarkable. The esophagus is     unremarkable.             The limited evaluation of the upper abdomen demonstrates no definitive acute     abnormalities. There       is evidence for a hemorrhagic cyst versus renal mass involving the mid to upper     pole of the left       kidney. Additional small renal cysts are noted bilaterally.              No acute osseous abnormalities are seen.                CONCLUSION:         1. No evidence for pulmonary embolism.      2. No evidence for acute intrathoracic abnormality.      3. Intermediate density focus at the mid to upper pole of left kidney. This     finding may be related       to a hemorrhagic cyst or possibly a left renal mass. Recommend correlation with     followup       nonemergent MRI of the kidneys with gadolinium contrast as an outpatient for     better       catheterization. If MRI is not feasible for the patient, a three-phase CT scan     the kidneys would       also be helpful for better characterization.              ALMITA FIELDS MD             Electronically Signed and Approved By: ALMITA FIELDS MD on 1/18/2018 at 2:41                        Until signed, this is an unconfirmed preliminary report that may contain      errors and is subject to change.                                              HAZDA:      D:01/18/18 0241            PLAN      Assessment      Asthma with exacerbation - J45.901            Cough - R05            Wheezing - R06.2            [place] (dyspnea on exertion) - R06.09            Snoring - R06.83            Daytime sleepiness - R40.0            Fatigue - R53.83            Notes      New Medications      * Benzonatate (Tessalon Perles) 100 MG CAP: 100 MG PO Q4H PRN COUGH #100       Dx: Asthma with exacerbation - J45.901      * ARFORMOTEROL TARTRATE (Brovana) 15 MCG/2 ML VIAL.NEB: 15 MCG INH RTQ12H #60       Instructions: DIAGNOSIS CODE REQUIRED PRIOR TO PRESCRIBING.       Dx: Asthma with exacerbation - J45.901      * Neb-Budesonide (Pulmicort) 0.5 MG/2 ML AMPUL.NEB: 0.5 MG INH RTQ12H #60       Instructions: DIAGNOSIS CODE REQUIRED PRIOR TO PRESCRIBING.       Dx: Asthma with exacerbation - J45.901      Renewed Medications      * Albuterol/Ipratropium (Duoneb) 3 ML AMPUL.NEB: 3 ML INH RTQID #90       Instructions: DIAGNOSIS CODE REQUIRED PRIOR TO PRESCRIBING.       Dx:  Asthma - J45.909      * MONTELUKAST SODIUM (Montelukast*) 10 MG TABLET: 10 MG PO HS #30      * Cetirizine Hcl (ZyrTEC*) 10 MG TABLET: 10 MG PO HS #30      Changed Medications      * PANTOPRAZOLE (Protonix*) 40 MG TABLET.DR: 40 MG PO HS #30       Replaced 20 MG TABLET: 20 MG PO HS #30       Instructions: Take on an empty stomach.       Dx: Asthma with exacerbation - J45.901      Discontinued Medications      * Benzonatate 200 MG CAPSULE: 200 MG PO TID PRN COUGH #30      * predniSONE* 10 MG TABLET: 10 MG PO ASDIR #40       Instructions: Daily dose: 40mg x4days, 30mg x4days, 20mg x4days, then 10mg     x4days.      New Diagnostics      * 6 Min Walk With Pulse Ox, As Soon As Possible       Dx: Asthma with exacerbation - J45.901      * PFT-Comp, PrePost,DLCO,BodyBox, As Soon As Possible       Dx: Asthma with exacerbation - J45.901      * Basic Sleep Study, Week       Dx: Snoring - R06.83      ASSESSMENT/PLAN:      1. Asthma with acute exacerbation, likely moderate persistent asthma.       2. Cough.       3. Wheeze.       4. Dyspnea on exertion.       5. Remote former tobacco abuse of cigarettes, now in remission.             PLAN:      1. At this time the patient appears to have asthma that is not well controlled     with an acute exacerbation. There also may be a component of chronic     obstructive pulmonary disease. I will obtain pulmonary function tests and six     minute walk test. In the meantime, I will start her on scheduled nebulizers     with Brovana and Pulmicort twice daily. I have given her samples of Brovana and     showed her how to use it today.       2. I have instructed her to continue using DuoNeb q 4-6 hours as needed for     increased shortness of breath, cough or wheezing.       3. There is likely an allergic component to her symptoms so I have instructed     her to continue using Singulair and Zyrtec which I have refilled for her today.     She also requested a refill of Tessalon pearls which she  was given at her     hospital discharge and I have sent that for her today. I think her coughing     will significantly improve as her asthma is better controlled.       4. Continue Protonix. I have increased her dose to 40 mg PO daily for     gastrointestinal esophageal reflux disease to see if that will help reduce her     coughing as her coughing and wheezing is typically worse at night.       5. The patient may have undiagnosed obstructive sleep apnea as she snores at     night, is frequently tired during the day, wakes up not feeling rested and     dozes off easily during the day. I have ordered a sleep study, and we will     review the results when available.       6.  The patient has a follow up appointment with Dr. Bill next month which she     would like to keep. If her test results are available at that time we will     review them then. I instructed her to call the office as needed in the meantime.            Patient Education      Patient Education Provided:  Acute Asthma, How to use a Nebulizer                 Disclaimer: Converted document may not contain table formatting or lab diagrams. Please see [employer] System for the authenticated document.

## 2021-05-28 NOTE — PROGRESS NOTES
Patient: OPAL MCKEON     Acct: [id number]     Report: #[id number]  UNIT #: [id number]     : 1935    Encounter Date:2018  PRIMARY CARE: Arias Beck  ***Signed***  --------------------------------------------------------------------------------------------------------------------  Chief Complaint      Encounter Date      Mar 1, 2018            Referring Provider      FABIOLA SMITH            Patient Complaint      Patient is complaining of      COPD            VITALS      Height 5 ft 3 in / 160.02 cm      Weight 140 lbs 0 oz / 63.513869 kg      BSA 1.69 m2      BMI 24.8 kg/m2      Temperature 98.2 F / 36.78 C - Oral      Pulse 93      Respirations 16      Blood Pressure 172/88 Sitting, Right Arm      Pulse Oximetry 98%, room air      Exhaled Nitrous Oxide Testin            HPI      The patient is a very pleasant 82 year old female who was recently admitted to     the hospital for chronic obstructive pulmonary disease and asthma exacerbation.      Since her hospital discharge she was seen by SB Mcclellan on 18.     She is here for follow up.             Since her last office visit she had pulmonary function tests and six minute     walk test, the results of the tests were reviewed with her today. Her     immunoglobulin levels were checked in the hospital. She had a high serum IgE     level. She  was started on Brovana and Pulmicort nebulizers which help her     tremendously. She is on albuterol for rescue which she used twice daily. She is     on DuoNeb nebulizer which she has not used. She continues to take Singulair,     Protonix and Cetirizine. She used to wake up 4-5 times a night with cough and     wheezing but it has significantly improved but she is still waking up once a     night. Her pulmonary function tests showed moderate obstructive airway disease.     Serum IgE level was high. Overall her symptoms are improved.            ROS      Constitutional:  Complains of:  Fatigue, Denies: Fever, Weight gain, Weight loss    , Chills, Insomnia, Other      Respiratory/Breathing:  Complains of: Shortness of air, Wheezing, Cough, Denies    : Hemoptysis, Pleuritic pain, Other      Endocrine:  Denies: Polydipsia, Polyuria, Heat/cold intolerance, Abnorml     menstrual pattern, Diabetes, Other      Eyes:  Denies: Blurred vision, Vision Changes, Other      Ears, nose, mouth, throat:  Denies: Mouth lesions, Thrush, Throat pain,     Hoarseness, Allergies/Hay Fever, Post Nasal Drip, Headaches, Recent Head Injury    , Nose Bleeding, Neck Stiffness, Thyroid Mass, Hearing Loss, Ear Fullness, Dry     Mouth, Nasal or Sinus Pain, Dry Lips, Nasal discharge, Nasal congestion, Other      Cardiovascular:  Denies: Palpitations, Syncope, Claudication, Chest Pain, Wake     up Gasping for air, Leg Swelling, Irregular Heart Rate, Cyanosis, Dyspnea on     Exertion, Other      Gastrointestinal:  Denies: Nausea, Constipation, Diarrhea, Abdominal pain,     Vomiting, Difficulty Swallowing, Reflux/Heartburn, Dysphagia, Jaundice, Bloating    , Melena, Bloody stools, Other      Genitourinary:  Denies: Urinary frequency, Incontinence, Hematuria, Urgency,     Nocturia, Dysuria, Testicular problems, Other      Musculoskeletal:  Denies: Joint Pain, Joint Stiffness, Joint Swelling, Myalgias    , Other      Hematologic/lymphatic:  DENIES: Lymphadenopathy, Bruising, Bleeding tendencies,     Other      Neurological:  Denies: Headache, Numbness, Weakness, Seizures, Other      Psychiatric:  Denies: Anxiety, Appropriate Effect, Depression, Other      Sleep:  No: Excessive daytime sleep, Morning Headache?, Snoring, Insomnia?,     Stop breathing at sleep?, Other      Integumentary:  Denies: Rash, Dry skin, Skin Warm to Touch, Other      Immunologic/Allergic:  Denies: Latex allergy, Seasonal allergies, Asthma,     Urticaria, Eczema, Other      Immunization status:  No: Up to date            FAMILY/SOCIAL/MEDICAL HX      Surgical  "History:  Yes: Bowel Surgery (COLONOSCOPY), Cholecystectomy (OPEN),     Vascular Surgery (VEIN STRIPPING), No: AAA Repair, Abdominal Surgery,     Angioplasty, Appendectomy, Back Surgery, Bladder Surgery, Breast Surgery, CABG,     Carotid Stenosis, Ear Surgery, Eye Surgery, Head Surgery, Hernia Surgery,     Kidney Surgery, Nose Surgery, Oral Surgery, Orthopedic Surgery, Prostatectomy,     Rectal Surgery, Spinal Surgery, Testicular Surgery, Throat Surgery, Valve     Replacement, Other Surgeries      Stroke - Family Hx:  Father      Heart - Family Hx:  Father      Diabetes - Family Hx:  Father      Cancer/Type - Family Hx:  Child      Is Father Still Living?:  No      Is Mother Still Living?:  No       Family History:  Yes      Social History:  No Tobacco Use, No Alcohol Use, No Recreational Drug use      Smoking status:  Former smoker (A few a day for 30 years quit for 1 year )      Anticoagulation Therapy:  No      Antibiotic Prophylaxis:  No      Medical History:  Yes: Arthritis (R KNEE), Asthma, Diabetes (ORAL AND INSULIN,     TYPE II), Hemorrhoids/Rectal Prob (DIARRHEA FROM METFORMIN SOMETIMES, BELCHING     AT TIMES), High Blood Pressure (MED CONTROLS \"MOST OF THE TIME\"), Reflux Disease    , Shortness Of Breath, No: Alcoholism, Allergies, Anemia, Blood Disease, Broken     Bones, Cataracts, Chemical Dependency, Chemotherapy/Cancer, Chronic Bronchitis/    COPD, Emphysema, Chronic Liver Disease, Colon Trouble, Colitis, Diverticulitis,     Congestive Heart Failu, Deafness or Ringing Ears, Convulsions, Depression,     Anxiety, Bipolar Disorder, Epilepsy, Seizures, Forgetfullness, Glaucoma, Gall     Stones, Gout, Head Injury, Heart Attack, Heart Murmur, Hepatitis, Hiatal Hernia    , High Cholesterol, HIV (Do not ask - volu, Jaundice, Kidney or Bladder Disease    , Kidney Stones, Migrane Headaches, Mitral Valve Prolapse, Night sweats,     Phlebitis, Psychiatric Care, Rheumatic Fever, Sexually Transmitted Dis, Sinus   "   Trouble, Skin Disease/Psoriais/Ecz, Stroke, Thyroid Problem, Tuberculosis or     Pos TB Te, Miscellaneous Medical/oth      Psychiatric History      -            Hx Influenza Vaccination:  Yes      Date Influenza Vaccine Given:  Jan 1, 2018      Influenza Vaccine Declined:  No      2 or More Falls Past Year?:  No      Fall Past Year with Injury?:  No      Hx Pneumococcal Vaccination:  Yes      Encouraged to follow-up with:  PCP regarding preventative exams.      Chart initiated by      DAKOTA SORIANO MA            ALLERGIES/MEDICATIONS      Allergies:        Coded Allergies:             SULFA (SULFONAMIDE ANTIBIOTICS) (Verified  Allergy, Unknown, UNKNOWN, 3/1/    18)      Medications    Last Reconciled on 3/1/18 12:10 by GOLDEN BILL MD      Tiotropium Bromide (Spiriva Respimat 1.25 mcg/puff) 4 Gm Mist.inhal      2 PUFFS INH RTQDAY, #1 INH 9 Refills         Prov: Golden Bill         3/1/18       Neb-Budesonide (Pulmicort) 0.5 Mg/2 Ml Ampul.neb      0.5 MG INH RTQ12H, #60 NEB 4 Refills         Prov: Stefanie Reyna PA-C         2/19/18       Arformoterol Tartrate (Brovana) 15 Mcg/2 Ml Vial.neb      15 MCG INH RTQ12H, #60 NEB 4 Refills         Prov: Stefanie Reyna PA-C         2/19/18       Benzonatate (Tessalon Perles) 100 Mg Cap      100 MG PO Q4H Y for COUGH, #100 CAP 1 Refill         Prov: Stefanie Reyna PA-C         2/19/18       Cetirizine Hcl (ZyrTEC*) 10 Mg Tablet      10 MG PO HS, #30 TAB 6 Refills         Prov: Stefanie Reyna PA-C         2/19/18       Pantoprazole (Protonix*) 40 Mg Tablet.dr      40 MG PO HS, #30 TAB 6 Refills         Prov: Stefanie Reyna PA-C         2/19/18       Montelukast Sodium (Montelukast*) 10 Mg Tablet      10 MG PO HS, #30 TAB 6 Refills         Prov: Stefanie Reyna PA-C         2/19/18       Albuterol/Ipratropium (Duoneb) 3 Ml Ampul.neb      3 ML INH RTQID, #90 NEB 4 Refills         Prov: Stefanie Reyna PA-C         2/19/18       Alprazolam (Alprazolam) 0.25 Mg Tablet      0.125 MG PO Q8H Y  for ANXIETY, #10 TAB         Prov: ANA LUISA LONGORIA         1/21/18       Losartan/Hydrochlorothiazide (Losartan/Hctz 100/12.5 Mg) 1 Each Tablet      1 TAB PO QDAY, #30 TAB 0 Refills         Reported         1/18/18       Albuterol (Proair HFA*) Unknown Strength Inh      INH RTQ4H, #1 INH 0 Refills         Reported         1/16/18       Insulin Detemir (Levemir*) 100 Units/Ml Vial      6 UNITS SUBQ HS, EA         Reported         1/14/13       Hydrochlorothiazide (Hydrochlorothiazide*) 12.5 Mg Capsule      12.5 MG PO QDAY         Reported         1/14/13       Metformin Hcl (Metformin Hcl*) 500 Mg Tablet      500 MG PO BID, TAB         Reported         1/14/13      Current Medications      Current Medications Reviewed 3/1/18            EXAM      CONSTITUTIONAL: Pleasant female,  normal conversant.       EYES : Pink conjunctive, no ptosis, PERRL.       ENMT : Nose and ears appear normal, normal dentition, mild posterior pharyngeal     wall erythema. Mallampati classification       Neck: Nontender, no masses, no thyromegaly, no nodules.      Resp : Decreased breath sounds throughout with expiratory wheezing that is soft     and scattered throughout the lung fields.        CVS  : No carotid bruits, s1s2 nl, RRR, no murmur, rubs or gallop, no     peripheral edema       Chest wall: Normal rise with inspiration, nontender on palpation      GI   : Abdomen soft, with no masses, no hepatosplenomegaly, no hernias, BS+      MSK  : Normal gait and station, no digital cyanosis or clubbing       Skin : No rashes, ulcerations or lesions, normal turgor and temperature      Neuro: CN II - XII intact, no sensory deficits, DTRs intact and symmetrical, no     motor weakness      Psych: Appropriate affect, A   Vtials      Vitals:             Height 5 ft 3 in / 160.02 cm           Weight 140 lbs 0 oz / 63.631235 kg           BSA 1.69 m2           BMI 24.8 kg/m2           Temperature 98.2 F / 36.78 C - Oral           Pulse 93            Respirations 16           Blood Pressure 172/88 Sitting, Right Arm           Pulse Oximetry 98%, room air            REVIEW      Results Reviewed      PCCS Results Reviewed?:  Yes Prev Lab Results, Yes Prev Radiology Results, Yes     Previous Mecial Records      Radiographic Results               [facility]                PACS RADIOLOGY REPORT            Patient: OPAL MCKEON   Acct: #[id number]   Report: #[id number]            UNIT #: [id number]    DOS: 18 0156      INSURANCE:MEDICARE PART A   LOCATION:ER     : 1935            PROVIDERS      ADMITTING:     ATTENDING:       FAMILY:  Arias Beck   ORDERING:  Antwon Sandoval         OTHER:    DICTATING:  Tristen Martinez MD            REQ #:[id number]   EXAM:W - CT CHEST with CONTRAST      REASON FOR EXAM:  Shortness of Breath      REASON FOR VISIT:  ASTHMA ATTACK            *******Signed******         PROCEDURE:   CT CHEST W/ CONTRAST             COMPARISON:   [facility], CT, CT CHEST ABD PEL WO CONTRAST    , 2016, 10:12.        [facility], CT, CHEST W/ CONTRAST, 2017, 7:48.             INDICATIONS:   DYSPNEA, COUGH.             TECHNIQUE:   After obtaining the patient's consent, CT images were obtained     with non-ionic       intravenous contrast material.               PROTOCOL:     Pulmonary embolism imaging protocol performed                RADIATION:     DLP: 461.4mGy*cm          Automated exposure control was utilized to minimize radiation dose.       CONTRAST:   60 cc Isovue 370 I.V.             FINDINGS:      No significant focal filling defects are observed to indicate the presence of     pulmonary embolism.             Mild to moderate breathing motion artifact is seen bilaterally. Scattered     atelectasis is noted. No       well-defined consolidations or significant pleural effusions are observed. No     dominant  pulmonary       nodules or abnormal pulmonary masses are seen. The evaluation for subtle     pulmonary nodules is       limited due to breathing motion artifact.             No significant hilar, mediastinal, or axillary lymphadenopathy is seen. A     normal aortic arch       branching pattern is noted. The thyroid gland is unremarkable. The esophagus is     unremarkable.             The limited evaluation of the upper abdomen demonstrates no definitive acute     abnormalities. There       is evidence for a hemorrhagic cyst versus renal mass involving the mid to upper     pole of the left       kidney. Additional small renal cysts are noted bilaterally.             No acute osseous abnormalities are seen.                CONCLUSION:         1. No evidence for pulmonary embolism.      2. No evidence for acute intrathoracic abnormality.      3. Intermediate density focus at the mid to upper pole of left kidney. This     finding may be related       to a hemorrhagic cyst or possibly a left renal mass. Recommend correlation with     followup       nonemergent MRI of the kidneys with gadolinium contrast as an outpatient for     better       catheterization. If MRI is not feasible for the patient, a three-phase CT scan     the kidneys would       also be helpful for better characterization.              ALMITA FIELDS MD             Electronically Signed and Approved By: ALMITA FIELDS MD on 1/18/2018 at 2:41                        Until signed, this is an unconfirmed preliminary report that may contain      errors and is subject to change.                                              SHONDA:      D:01/18/18 0241            PLAN      Assessment      Asthma       Severe persistent asthma without complication - J45.50       Asthma severity: severe       Asthma persistence: persistent       Asthma complication type: uncomplicated            Allergic rhinitis       Chronic allergic rhinitis, unspecified seasonality, unspecified  trigger - J30.9       Chronicity: chronic       Allergic rhinitis trigger: unspecified       Allergic rhinitis seasonality: unspecified seasonality            Notes      New Medications      * TIOTROPIUM BROMIDE (Spiriva Respimat 1.25 mcg/puff) 4 GM MIST.INHAL: 2 PUFFS     INH RTQDAY #1      New Referrals      * Immunology/Allergy, SCHEDULED PROCEDURE       Dx: Asthma - J45.909      PLAN:      The patient is a 82 year old female with history of smoking in the past, has     moderate obstructive airway disease likely asthma and chronic obstructive     pulmonary disease overlap.             1. Asthma/chronic obstructive pulmonary disease overlap. Continue with Brovana     and Pulmicort nebulizers twice daily. Given her significant nocturnal symptoms     with waking up once a night, I will start her on Spiriva Respimat 1.25 mcg once     daily. Continue with albuterol as needed.       2. 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       3. Gastrointestinal esophageal reflux disease. Continue with Protonix 40 mg     once daily.       4. Sleep study has been ordered but we she has not done it yet.       5. She will need skin allergy tests. She used to be on allergy shots in the     past. I will refer her to allergy service for skin allergy testing.       6. She should avoid any triggers for asthma/chronic obstructive pulmonary     disease flares.       7. I will follow up with her in 3-4 months, earlier if needed.            Patient Education      Education resources provided:  Yes      Patient Education Provided:  Acute Bronchitis                 Disclaimer: Converted document may not contain table formatting or lab diagrams. Please see [facility] for the authenticated document.

## 2021-05-28 NOTE — PROGRESS NOTES
Patient: ANNITA RODRIGUEZ     Acct: [id number]     Report: #[id number]  UNIT #: [id number]     : 1935    Encounter Date:2021  PRIMARY CARE: Arias Beck  ***Signed***  --------------------------------------------------------------------------------------------------------------------  History of Present Illness            Chief Complaint: F/U, COPD, BRANDY            Annita Rodriguez is presenting for evaluation via Telehealth visit. Verbal     consent obtained before beginning visit.            PAST MEDICAL HISTORY/OVERVIEW OF PATIENT SYMPTOMS            Symptoms: Wheezing, SOA             Any known Exposure to COVID-19: NO            Former smoker (Pt started smoking at 18 yrs old/  ppd x 15 yrs/ quit at the     age of 77 )            Provider spent 10 minutes with the patient during telehealth visit.            The following staff were present during this visit: Raissa Pineda CMA, Dottie MORA             The patient is an 85 year old female patient of Dr. Bill's who has a history of    asthma/COPD overlap syndrome and obstructive sleep apnea.  The patient states     that since last visit her breathing has been doing well. The patient states she     has not had to take any antibiotics or steroids since last visit and denies any     hospitalizations since last visit. The patient states she is taking her Brovana,    Pulmicort and Spiriva everyday as prescribed and does use her albuterol inhaler     and nebulizers as needed. The patient is suppose to be on a CPAP nightly,     however she has not been wearing it due to having a difficult time handling a     full face mask and would like to try a different face mask to see if she is able    to tolerate her CPAP better and she does have excessive daytime sleepiness. The     patient currently denies any fever, chills, night sweats, hemoptysis, purulent     sputum production, chest pain, chest tightness, swollen glands in the head  and     neck, abdominal pain, nausea, vomiting or diarrhea.   The patient denies any     headaches, myalgias, sore throat, changes in sense of taste and/or smell or any     other coronavirus or flu-like symptoms.  The patient denies any leg swelling,     paroxysmal nocturnal dyspnea, nocturnal awakenings or orthopnea.  The patient     states she did receive her first COVID vaccine.  The patient states she is able     to perform ADLs without difficulty.            I have personally reviewed the review of systems, past family, social, surgical     and medical histories and I agree with those as entered in the chart.              Physical exam deferred due to TeleHealth visit.              I reviewed James Borges last office visit note.              Allergies and Medications      Allergies:        Coded Allergies:             SULFA (SULFONAMIDE ANTIBIOTICS) (Verified  Allergy, Unknown, UNKNOWN,     3/17/21)      Medications    Last Reconciled on 3/17/21 09:48 by JIMMY ARMSTRONG,       Arformoterol Tartrate (Brovana) 15 Mcg/2 Ml Vial.neb      15 MCG INH Q12HR for 30 Days, #60 NEB 9 Refills         Prov: Golden Bill         2/22/21       Neb-Budesonide (Pulmicort) 0.5 Mg/2 Ml Ampul.neb      0.5 MG INH Q12HR for 30 Days, #60 NEB 9 Refills         Prov: Golden Bill         2/22/21       NEB-Albuterol Sulf (Albuterol) 2.5 Mg/3 Ml Vial.neb      2.5 MG INH Q6H PRN for SHORTNESS OF BREATH for 30 Days, #120 NEB 9 Refills         Prov: Golden Bill         2/22/21       MDI-Albuterol (Proair HFA) 8.5 Gm Hfa.aer.ad      1 PUFFS INH Q4-6H PRN for SHORTNESS OF BREATH for 30 Days, #1 MDI 9 Refills         Prov: Golden Bill         2/22/21       Tiotropium Bromide (Spiriva Respimat 2.5 mcg/Puff) 4 Gm Mist.inhal               Prov: JUDITH HUNT PA-C         9/17/20       Melatonin (Melatonin) 5 Mg Tablet      5 MG PO HS for 30 Days, #30 TAB 11 Refills         Prov: JUDITH HUNT PA-C         9/17/20        Pantoprazole (Protonix) 40 Mg Tablet.      40 MG PO QDAY, #90 TAB 4 Refills         Prov: JUDITH HUNT PA-C         9/17/20       Tiotropium Bromide (Spiriva Respimat 2.5 mcg/Puff) 4 Gm Mist.inhal      2 PUFFS INH RTQDAY, #3 MDI 4 Refills         Prov: JUDITH HUNT PA-C         9/17/20       Methotrexate Sodium (Methotrexate) 2.5 Mg Tab      10 MG PO Flores, TAB         Reported         9/17/20       amLODIPine (amLODIPine) 10 Mg Tablet      10 MG PO HS, #30 TAB 0 Refills         Reported         8/29/19       Folic Acid (Folic Acid) 1 Mg Tablet      1 MG PO QDAY, #30 TAB 0 Refills         Reported         8/29/19       Insulin Detemir (Levemir*) 100 Units/Ml Vial      10 UNITS SUBQ HS, EA         Reported         1/14/13       metFORMIN HCl (metFORMIN HCl) 500 Mg Tablet      500 MG PO BID, TAB         Reported         1/14/13            Assessment      Shortness of Air  R06.02            Plan      Orders:  Phone Eval 5-10 min [id number]      Instructions      * Chronic conditions reviewed and taken in consideration for today's treatment       plan.      * Plan Of Care: ()      * Patient instructed to seek medical attention urgently for new or worsening       symptoms.      * Patient was educated/instructed on their diagnosis, treatment and medications       today.      * Recommend self monitoring. Instructions given.      * Recommend self quarantine for 14 days.      * Recommend self quarantine until without fever for 72 hours without using fever       reducing medications.      * Recommends over the counter medications for symptom management.            ASSESSMENT:       1.  Asthma/COPD overlap syndrome without acute exacerbation.  The patient on     triple inhaler/nebulizer therapy.      2.  Obstructive sleep apnea, noncompliant with CPAP.      3. Gastroesophageal reflux disease, patient on PPI.      4. Rheumatoid arthritis followed by Dr. Gutierrez, patient on methotrexate.      5. Tobacco abuse with  cigarettes in remission.            PLAN:      1. The patient to continue Brovana, Pulmicort and Spiriva everyday as prescribed     and rinse her mouth out after each use.      2.  The patient to continue albuterol inhaler and nebulizer treatments as     needed.      3. For gastroesophageal reflux disease patient to continue proton pump i    nhibitor, sleep with the head of bed elevated and to not eat 3-4 hours prior to     bedtime.        4. The patient is advised to follow up with Dr. Gutierrez, rheumatologist for her     rheumatoid arthritis and methotrexate management. The patient is advised to     continue folic acid daily.      5. For obstructive sleep apnea, the patient is noncompliant with CPAP.  The risk     of not treating obstructive sleep apnea discussed with patient. The patient     would like to try a different type of mask. I will have Charlie Senior, Clinical     Coordinator contact [facility] to see if we can set patient up with a different     type of mask to see if she can tolerate that better as we do need to trigger     sleep apnea.      6. Patient is advised to call the office, 911 or go to the ER with any new or     worsening symptoms.      7.  The patient reports she is up-to-date with her flu and pneumonia vaccines.     The patient reports she also received her first COVID19 vaccine.  The patient is     advised to receive her second COVID19 vaccine as scheduled.  The patient is     advised to continue to follow CDC recommendations of social distancing, wearing     a mask and washing hands for at least 20 seconds.        8. The patient to follow up with Dr. Bill in 2-3 months to review CPAP     compliance, sooner if needed.  The patient is also advised that if she has a     difficult time with new type mask to contact our office.            Electronically signed by JIMMY ARMSTRONG [facility]  03/18/2021 16:14       Disclaimer: Converted document may not contain table formatting or lab diagrams. Please  see [employer] for the authenticated document.

## 2021-06-03 ENCOUNTER — OFFICE VISIT CONVERTED (OUTPATIENT)
Dept: PULMONOLOGY | Facility: CLINIC | Age: 86
End: 2021-06-03
Attending: INTERNAL MEDICINE

## 2021-06-06 VITALS
WEIGHT: 123 LBS | BODY MASS INDEX: 21.79 KG/M2 | OXYGEN SATURATION: 99 % | HEART RATE: 76 BPM | SYSTOLIC BLOOD PRESSURE: 143 MMHG | DIASTOLIC BLOOD PRESSURE: 68 MMHG | HEIGHT: 63 IN | RESPIRATION RATE: 14 BRPM

## 2021-06-06 NOTE — PROGRESS NOTES
Patient: OPAL MCKEON     Acct: [id number]     Report: #[id number]  UNIT #: [id number]     : 1935    Encounter Date:2021  PRIMARY CARE: Arias Beck  ***Signed***  --------------------------------------------------------------------------------------------------------------------  Chief Complaint      Encounter Date      Manuel 3, 2021            Primary Care Provider      Arias Beck            Referring Provider      Arias Beck            Patient Complaint      Patient is complaining of      Pt. is here for a 2-3month f/u. COPD            VITALS      Height 5 ft 3 in / 160.02 cm      Weight 123 lbs 0 oz / 55.473772 kg      BSA 1.57 m2      BMI 21.8 kg/m2      Pulse 76      Respirations 14      Blood Pressure 143/68 Sitting, Left Arm      Pulse Oximetry 99%, room air            HPI      The patient is an 86 year old female with a history of asthma and COPD overlap     syndrome, obstructive sleep apnea on nightly CPAP, tobacco abuse of cigarettes     in remission, gastroesophageal reflux disease and rheumatoid arthritis who is     here for follow up.  The patient is currently on Brovana, Pulmicort and Spiriva     and albuterol as needed which she uses very rarely and is overall doing well.      She has not had any antibiotics or steroids recently.  No fever or chills. She     continues to take pantoprazole once daily, keeps the head of bed up with sleep     and does not eat close to bedtime. She has not been using CPAP machine as she     has been having a hard time tolerating a full face mask and is willing to try     nasal pillow.  Personally is her [employer] Cables.  She has not had any changes in     weight or appetite and overall is doing well. She is up-to-date on COVID     vaccine, but has not had pneumonia vaccine per her recollection.            ROS      Constitutional:  Denies: Fatigue, Fever, Weight gain, Weight loss, Chills,     Insomnia, Other      Respiratory/Breathing:  Denies:  Shortness of air, Wheezing, Cough, Hemoptysis,     Pleuritic pain, Other      Endocrine:  Denies: Polydipsia, Polyuria, Heat/cold intolerance, Abnorml     menstrual pattern, Diabetes, Other      Eyes:  Denies: Blurred vision, Vision Changes, Other      Ears, nose, mouth, throat:  Denies: Congestion, Dysphagia, Hearing Changes, Nose    Bleeding, Nasal Discharge, Throat pain, Tinnitus, Other      Cardiovascular:  Denies: Chest Pain, Exertional dyspnea, Peripheral Edema,     Palpitations, Syncope, Wake up Gasping for air, Orthopnea, Tachycardia, Other      Gastrointestinal:  Denies: Abdominal pain/cramping, Bloody stools, Constipation,    Diarrhea, Melena, Nausea, Vomiting, Other      Genitourinary:  Denies: Dysuria, Urinary frequency, Incontinence, Hematuria,     Urgency, Other      Musculoskeletal:  Denies: Joint Pain, Joint Stiffness, Joint Swelling, Myalgias,    Other      Hematologic/lymphatic:  DENIES: Lymphadenopathy, Bruising, Bleeding tendencies,     Other      Neurologic:  Denies: Headache, Numbness, Weakness, Seizures, Other      Psychiatric:  Denies: Anxiety, Appropriate Effect, Depression, Other      Sleep:  No: Excessive daytime sleep, Morning Headache?, Snoring, Insomnia?, Stop    breathing at sleep?, Other      Integumentary:  Denies: Rash, Dry skin, Skin Warm to Touch, Other            FAMILY/SOCIAL/MEDICAL HX      Surgical History:  Yes: Abdominal Surgery, Bowel Surgery (COLONOSCOPY),     Cholecystectomy (OPEN), Vascular Surgery (VEIN STRIPPING); No: AAA Repair,     Angioplasty, Appendectomy, Back Surgery, Bladder Surgery, Breast Surgery, CABG,     Carotid Stenosis, Ear Surgery, Eye Surgery, Head Surgery, Hernia Surgery, Kidney    Surgery, Nose Surgery, Oral Surgery, Orthopedic Surgery, Prostatectomy, Rectal     Surgery, Spinal Surgery, Testicular Surgery, Throat Surgery, Valve Replacement,     Other Surgeries      Stroke - Family Hx:  Father      Heart - Family Hx:  Father      Diabetes - Family Hx:   Father      Cancer/Type - Family Hx:  Child      Is Father Still Living?:  No      Is Mother Still Living?:  No      Social History:  No Tobacco Use, No Alcohol Use, No Recreational Drug use      Smoking status:  Former smoker (Pt started smoking at 18 yrs old/ 1/2 ppd x 15     yrs/ quit at the age of 77 )       Section:  No      Tubal Ligation:  No      Hysterectomy:  Yes      Anticoagulation Therapy:  No      Antibiotic Prophylaxis:  No      Medical History:  Yes: Arthritis (R KNEE, HANDS), Asthma, Diabetes ( TYPE II),     Hemorrhoids/Rectal Prob (SBO), High Blood Pressure, Reflux Disease, Shortness Of    Breath; No: Anemia, Blood Disease, Broken Bones, Cataracts, Chemical Dependency,    Chemotherapy/Cancer, Chronic Bronchitis/COPD, Emphysema, Chronic Liver Disease,     Colon Trouble, Colitis, Diverticulitis, Congestive Heart Failu, Deafness or     Ringing Ears, Depression, Anxiety, Bipolar Disorder, Epilepsy, Seizures,     Glaucoma, Gall Stones, Gout, Head Injury, Heart Attack, Heart Murmur, Hepatitis,    Hiatal Hernia, HIV (Do not ask - volu, Kidney or Bladder Disease, Kidney Stones,    Migrane Headaches, Mitral Valve Prolapse, Psychiatric Care, Rheumatic Fever,     Sexually Transmitted Dis, Sinus Trouble, Skin Disease/Psoriais/Ecz, Stroke, Thyr    oid Problem, Tuberculosis or Pos TB Te, Miscellaneous Medical/oth      Psychiatric History      None            PREVENTION      Hx Influenza Vaccination:  Yes      Date Influenza Vaccine Given:  Oct 1, 2020      Influenza Vaccine Declined:  No      2 or More Falls in Past Year?:  No      Fall Past Year with Injury?:  No      Hx Pneumococcal Vaccination:  No      Encouraged to follow-up with:  PCP regarding preventative exams.      Chart initiated by      Raissa Dean MA            ALLERGIES/MEDICATIONS      Allergies:        Coded Allergies:             SULFA (SULFONAMIDE ANTIBIOTICS) (Verified  Allergy, Unknown, UNKNOWN,     6/3/21)      Medications     Last Reconciled on 6/3/21 08:37 by GOLDEN BILL MD      Arformoterol Tartrate (Brovana) 15 Mcg/2 Ml Vial.neb      15 MCG INH Q12HR for 30 Days, #60 NEB 9 Refills         Prov: Golden Bill         2/22/21       Neb-Budesonide (Pulmicort) 0.5 Mg/2 Ml Ampul.neb      0.5 MG INH Q12HR for 30 Days, #60 NEB 9 Refills         Prov: Golden Bill         2/22/21       NEB-Albuterol Sulf (Albuterol) 2.5 Mg/3 Ml Vial.neb      2.5 MG INH Q6H PRN for SHORTNESS OF BREATH for 30 Days, #120 NEB 9 Refills         Prov: Golden Bill         2/22/21       MDI-Albuterol (Proair HFA) 8.5 Gm Hfa.aer.ad      1 PUFFS INH Q4-6H PRN for SHORTNESS OF BREATH for 30 Days, #1 MDI 9 Refills         Prov: Golden Bill         2/22/21       Tiotropium Bromide (Spiriva Respimat 2.5 mcg/Puff) 4 Gm Mist.inhal               Prov: JUDITH HUNT PA-C         9/17/20       Melatonin (Melatonin) 5 Mg Tablet      5 MG PO HS for 30 Days, #30 TAB 11 Refills         Prov: JUDITH HUNT PA-C         9/17/20       Pantoprazole (Protonix) 40 Mg Tablet.dr      40 MG PO QDAY, #90 TAB 4 Refills         Prov: JUDITH HUNT PA-C         9/17/20       Tiotropium Bromide (Spiriva Respimat 2.5 mcg/Puff) 4 Gm Mist.inhal      2 PUFFS INH RTQDAY, #3 MDI 4 Refills         Prov: JUDITH HUNT PA-C         9/17/20       Methotrexate Sodium (Methotrexate) 2.5 Mg Tab      10 MG PO Flores, TAB         Reported         9/17/20       amLODIPine (amLODIPine) 10 Mg Tablet      10 MG PO HS, #30 TAB 0 Refills         Reported         8/29/19       Folic Acid (Folic Acid) 1 Mg Tablet      1 MG PO QDAY, #30 TAB 0 Refills         Reported         8/29/19       Insulin Detemir (Levemir*) 100 Units/Ml Vial      10 UNITS SUBQ HS, EA         Reported         1/14/13       metFORMIN HCl (metFORMIN HCl) 500 Mg Tablet      500 MG PO BID, TAB         Reported         1/14/13      Current Medications      Current Medications Reviewed 6/3/21            EXAM      CONSTITUTIONAL:  Pleasant female,  normal conversant.       EYES : Pink conjunctive, no ptosis, PERRL.       ENMT : Nose and ears appear normal, normal dentition, mild posterior pharyngeal     wall erythema. Mallampati classification       Neck: Nontender, no masses, no thyromegaly, no nodules.      Resp : Decreased breath sounds throughout with expiratory wheezing that is soft     and scattered throughout the lung fields.        CVS  : No carotid bruits, s1s2 nl, RRR, no murmur, rubs or gallop, no peripheral    edema       Chest wall: Normal rise with inspiration, nontender on palpation      GI   : Abdomen soft, with no masses, no hepatosplenomegaly, no hernias, BS+      MSK  : Normal gait and station, no digital cyanosis or clubbing       Skin : No rashes, ulcerations or lesions, normal turgor and temperature      Neuro: CN II - XII intact, no sensory deficits, DTRs intact and symmetrical, no     motor weakness      Psych: Appropriate affect, A   Vitals      Vitals:             Height 5 ft 3 in / 160.02 cm           Weight 123 lbs 0 oz / 55.967575 kg           BSA 1.57 m2           BMI 21.8 kg/m2           Pulse 76           Respirations 14           Blood Pressure 143/68 Sitting, Left Arm           Pulse Oximetry 99%, room air            REVIEW      Results Reviewed      PCCS Results Reviewed?:  Yes Prev Lab Results, Yes Prev Radiology Results, Yes     Previous Mecial Records      Radiographic Results               [facility] Diagnostic g                PACS RADIOLOGY REPORT            Patient: OPAL MCKEON   Acct: #[id number]   Report: #[id number]            UNIT #: [id number]    DOS: 19 1106      INSURANCE:MEDICARE PART A   LOCATION:[facility]     : 1935            PROVIDERS      ADMITTING:     ATTENDING: MAKSIM HOPKINS      FAMILY:  Arias Beck   ORDERING:  MAKSIM HOPKINS         OTHER:    DICTATING:  AUGUSTIN SALVADOR MD            REQ #:[id number]   EXAM:CXR2 -  CHEST 2V AP PA LAT      REASON FOR EXAM:        REASON FOR VISIT:  RHEUMATOID ARTHRITIS, LONG TERM DRUG THERAPY            *******Signed******         PROCEDURE:   CHEST AP/PA AND LATERAL             COMPARISON:   [facility], , CHEST AP/PA 1 VIEW, 1/17/2018,     22:47.             INDICATIONS:   RHEUMATOID ARTHRITIS, LONG TERM DRUG THERAPY. [facility] ALSO STATES     WHEEZING FOR ABOUT 2       WEEKS. STATE SHE HAS ASTHMA ALSO.             FINDINGS:         Heart size within normal limits.  Lungs are clear.  No dense consolidation,     pleural fluid, or       pneumothorax.             CONCLUSION:   No appreciable change from 1/17/2018              AUGUSTIN SALVADOR MD             Electronically Signed and Approved By: AUGUSTIN SALVADOR MD on 11/11/2019 at 12:03                        Until signed, this is an unconfirmed preliminary report that may contain      errors and is subject to change.                                              CASEY:      D:11/11/19 1203      PFT Results      [id number]  [id number]                                 [facility]                          Health Information Management Services                            [place], [place]  [address]               __________________________________________________________________________             Patient Name:                   Attending Physician:      Annita Rodriguez PA-C [facility]             Patient Visit # MR #            Admit Date  Disch Date     Location      [id number]    [id number]      02/23/2018                 [facility]- -             Date of Birth      1935      __________________________________________________________________________      0821 - DIAGNOSTIC REPORT             PULMONARY FUNCTION TEST             SPIROMETRY:      Spirometry shows moderate obstructive defect.      FEV1/FVC is 62.      FEV1 is 0.99, 68% of predicted.      FVC is 1.59 L, 84% of predicted.              BRONCHODILATOR RESPONSE:      There is no significant response to bronchodilator administration.             LUNG VOLUMES:      Lung volumes are normal.      Total lung capacity is 4.01 L, 96% of predicted.      Residual volume is 2.38 L, 119% of predicted.             DIFFUSION:      Diffusion capacity is moderately decreased at 56% of predicted.             FLOW VOLUME LOOP:      Flow volume loop is compatible with obstructive process.             CONCLUSION:      Low FEV1/FVC with low FEV1 and FVC with normal lung volumes with low      diffusion capacity.  It suggests moderate to moderately severe obstructive      airway disease, likely emphysema.  Please correlate clinically.             To be electronically signed in [place]      [id number] GOLDEN BILL M.D.             NK:kelton      D:  03/14/2018 17:28      T:  03/14/2018 17:50      #[id number]             Until signed, this is an unconfirmed preliminary report that may contain      errors and is subject to change.                   03/19/18 1019  <Electronically signed by Golden iBll MD>            Assessment      Notes      New Office Procedures      * Prevnar-13, As Soon As Possible         Pneumoc 13-Jackie Conj-Dip CRm/Pf (Prevnar 13 Syringe) 0.5 ML SYRINGE: 0.5        MILLILITER INTRAMUSCULARLY Qty 0.5 ML      PLAN:  The patient is an 86 year old female with asthma and COPD overlap     syndrome with obstructive sleep apnea, tobacco abuse of cigarettes in remission,    gastroesophageal reflux disease and rheumatoid arthritis who is being followed     by Dr. Guillory and is currently on methotrexate.            1. Asthma/chronic obstructive pulmonary disease overlap syndrome.  Continue with    Brovana, Pulmicort and Spiriva.  Continue albuterol as needed.      2. Gastroesophageal reflux disease.  Continue with Protonix once daily.      3. We will administer Prevnar vaccine today. She is up-to-date on COVID vaccine.           4. I will refill her medications.      5. We  will contact [employer] to get the nasal pillow for her CPAP machine.  She     has not been using it because of the issues with full face mask.      6.  Follow up in six months, earlier if needed.            Patient Education      Education resources provided:  Yes      Patient Education Provided:  Acute Bronchitis            Electronically signed by Golden Bill  06/04/2021 11:57       Disclaimer: Converted document may not contain table formatting or lab diagrams. Please see [employer] for the authenticated document.

## 2021-06-17 ENCOUNTER — TRANSCRIBE ORDERS (OUTPATIENT)
Dept: ADMINISTRATIVE | Facility: HOSPITAL | Age: 86
End: 2021-06-17

## 2021-06-17 ENCOUNTER — LAB (OUTPATIENT)
Dept: LAB | Facility: HOSPITAL | Age: 86
End: 2021-06-17

## 2021-06-17 DIAGNOSIS — M06.09 RHEUMATOID ARTHRITIS OF MULTIPLE SITES WITHOUT RHEUMATOID FACTOR (HCC): Primary | ICD-10-CM

## 2021-06-17 DIAGNOSIS — Z79.899 ENCOUNTER FOR LONG-TERM (CURRENT) USE OF OTHER MEDICATIONS: ICD-10-CM

## 2021-06-17 DIAGNOSIS — M17.11 ARTHRITIS OF RIGHT KNEE: ICD-10-CM

## 2021-06-17 DIAGNOSIS — M06.09 RHEUMATOID ARTHRITIS OF MULTIPLE SITES WITHOUT RHEUMATOID FACTOR (HCC): ICD-10-CM

## 2021-06-17 LAB
ALBUMIN SERPL-MCNC: 4.5 G/DL (ref 3.5–5.2)
ALBUMIN/GLOB SERPL: 1.6 G/DL
ALP SERPL-CCNC: 79 U/L (ref 39–117)
ALT SERPL W P-5'-P-CCNC: 11 U/L (ref 1–33)
ANION GAP SERPL CALCULATED.3IONS-SCNC: 14.9 MMOL/L (ref 5–15)
AST SERPL-CCNC: 14 U/L (ref 1–32)
BASOPHILS # BLD AUTO: 0.04 10*3/MM3 (ref 0–0.2)
BASOPHILS NFR BLD AUTO: 0.9 % (ref 0–1.5)
BILIRUB SERPL-MCNC: 0.4 MG/DL (ref 0–1.2)
BUN SERPL-MCNC: 14 MG/DL (ref 8–23)
BUN/CREAT SERPL: 24.6 (ref 7–25)
CALCIUM SPEC-SCNC: 9.4 MG/DL (ref 8.6–10.5)
CHLORIDE SERPL-SCNC: 104 MMOL/L (ref 98–107)
CO2 SERPL-SCNC: 23.1 MMOL/L (ref 22–29)
CREAT SERPL-MCNC: 0.57 MG/DL (ref 0.57–1)
CRP SERPL-MCNC: 0.47 MG/DL (ref 0–0.5)
DEPRECATED RDW RBC AUTO: 48.7 FL (ref 37–54)
EOSINOPHIL # BLD AUTO: 0.18 10*3/MM3 (ref 0–0.4)
EOSINOPHIL NFR BLD AUTO: 4 % (ref 0.3–6.2)
ERYTHROCYTE [DISTWIDTH] IN BLOOD BY AUTOMATED COUNT: 14.6 % (ref 12.3–15.4)
ERYTHROCYTE [SEDIMENTATION RATE] IN BLOOD: 10 MM/HR (ref 0–30)
GFR SERPL CREATININE-BSD FRML MDRD: 101 ML/MIN/1.73
GFR SERPL CREATININE-BSD FRML MDRD: 122 ML/MIN/1.73
GLOBULIN UR ELPH-MCNC: 2.9 GM/DL
GLUCOSE SERPL-MCNC: 98 MG/DL (ref 65–99)
HCT VFR BLD AUTO: 36.4 % (ref 34–46.6)
HGB BLD-MCNC: 12 G/DL (ref 12–15.9)
IMM GRANULOCYTES # BLD AUTO: 0.01 10*3/MM3 (ref 0–0.05)
IMM GRANULOCYTES NFR BLD AUTO: 0.2 % (ref 0–0.5)
LYMPHOCYTES # BLD AUTO: 1.36 10*3/MM3 (ref 0.7–3.1)
LYMPHOCYTES NFR BLD AUTO: 29.9 % (ref 19.6–45.3)
MCH RBC QN AUTO: 30.3 PG (ref 26.6–33)
MCHC RBC AUTO-ENTMCNC: 33 G/DL (ref 31.5–35.7)
MCV RBC AUTO: 91.9 FL (ref 79–97)
MONOCYTES # BLD AUTO: 0.3 10*3/MM3 (ref 0.1–0.9)
MONOCYTES NFR BLD AUTO: 6.6 % (ref 5–12)
NEUTROPHILS NFR BLD AUTO: 2.66 10*3/MM3 (ref 1.7–7)
NEUTROPHILS NFR BLD AUTO: 58.4 % (ref 42.7–76)
NRBC BLD AUTO-RTO: 0 /100 WBC (ref 0–0.2)
PLATELET # BLD AUTO: 287 10*3/MM3 (ref 140–450)
PMV BLD AUTO: 9.8 FL (ref 6–12)
POTASSIUM SERPL-SCNC: 4 MMOL/L (ref 3.5–5.2)
PROT SERPL-MCNC: 7.4 G/DL (ref 6–8.5)
RBC # BLD AUTO: 3.96 10*6/MM3 (ref 3.77–5.28)
SODIUM SERPL-SCNC: 142 MMOL/L (ref 136–145)
WBC # BLD AUTO: 4.55 10*3/MM3 (ref 3.4–10.8)

## 2021-06-17 PROCEDURE — 85025 COMPLETE CBC W/AUTO DIFF WBC: CPT

## 2021-06-17 PROCEDURE — 86140 C-REACTIVE PROTEIN: CPT

## 2021-06-17 PROCEDURE — 36415 COLL VENOUS BLD VENIPUNCTURE: CPT

## 2021-06-17 PROCEDURE — 80053 COMPREHEN METABOLIC PANEL: CPT

## 2021-06-17 PROCEDURE — 85652 RBC SED RATE AUTOMATED: CPT

## 2021-07-16 ENCOUNTER — TRANSCRIBE ORDERS (OUTPATIENT)
Dept: LAB | Facility: HOSPITAL | Age: 86
End: 2021-07-16

## 2021-07-16 ENCOUNTER — LAB (OUTPATIENT)
Dept: LAB | Facility: HOSPITAL | Age: 86
End: 2021-07-16

## 2021-07-16 DIAGNOSIS — E78.5 HYPERLIPIDEMIA, UNSPECIFIED HYPERLIPIDEMIA TYPE: ICD-10-CM

## 2021-07-16 DIAGNOSIS — I10 ESSENTIAL HYPERTENSION, MALIGNANT: ICD-10-CM

## 2021-07-16 DIAGNOSIS — E11.9 DIABETES MELLITUS WITHOUT COMPLICATION (HCC): Primary | ICD-10-CM

## 2021-07-16 DIAGNOSIS — E11.9 DIABETES MELLITUS WITHOUT COMPLICATION (HCC): ICD-10-CM

## 2021-07-16 LAB
ALBUMIN SERPL-MCNC: 4.4 G/DL (ref 3.5–5.2)
ALBUMIN UR-MCNC: <1.2 MG/DL
ALBUMIN/GLOB SERPL: 1.8 G/DL
ALP SERPL-CCNC: 80 U/L (ref 39–117)
ALT SERPL W P-5'-P-CCNC: 18 U/L (ref 1–33)
ANION GAP SERPL CALCULATED.3IONS-SCNC: 7.3 MMOL/L (ref 5–15)
AST SERPL-CCNC: 20 U/L (ref 1–32)
BASOPHILS # BLD AUTO: 0.04 10*3/MM3 (ref 0–0.2)
BASOPHILS NFR BLD AUTO: 1.1 % (ref 0–1.5)
BILIRUB SERPL-MCNC: 0.5 MG/DL (ref 0–1.2)
BUN SERPL-MCNC: 12 MG/DL (ref 8–23)
BUN/CREAT SERPL: 17.6 (ref 7–25)
CALCIUM SPEC-SCNC: 9.3 MG/DL (ref 8.6–10.5)
CHLORIDE SERPL-SCNC: 104 MMOL/L (ref 98–107)
CHOLEST SERPL-MCNC: 159 MG/DL (ref 0–200)
CO2 SERPL-SCNC: 28.7 MMOL/L (ref 22–29)
CREAT SERPL-MCNC: 0.68 MG/DL (ref 0.57–1)
DEPRECATED RDW RBC AUTO: 51.2 FL (ref 37–54)
EOSINOPHIL # BLD AUTO: 0.24 10*3/MM3 (ref 0–0.4)
EOSINOPHIL NFR BLD AUTO: 6.9 % (ref 0.3–6.2)
ERYTHROCYTE [DISTWIDTH] IN BLOOD BY AUTOMATED COUNT: 15.3 % (ref 12.3–15.4)
GFR SERPL CREATININE-BSD FRML MDRD: 100 ML/MIN/1.73
GLOBULIN UR ELPH-MCNC: 2.5 GM/DL
GLUCOSE SERPL-MCNC: 108 MG/DL (ref 65–99)
HBA1C MFR BLD: 6.4 % (ref 4.8–5.6)
HCT VFR BLD AUTO: 35.7 % (ref 34–46.6)
HDLC SERPL-MCNC: 59 MG/DL (ref 40–60)
HGB BLD-MCNC: 11.8 G/DL (ref 12–15.9)
IMM GRANULOCYTES # BLD AUTO: 0 10*3/MM3 (ref 0–0.05)
IMM GRANULOCYTES NFR BLD AUTO: 0 % (ref 0–0.5)
LDLC SERPL CALC-MCNC: 88 MG/DL (ref 0–100)
LDLC/HDLC SERPL: 1.5 {RATIO}
LYMPHOCYTES # BLD AUTO: 1.02 10*3/MM3 (ref 0.7–3.1)
LYMPHOCYTES NFR BLD AUTO: 29.1 % (ref 19.6–45.3)
MCH RBC QN AUTO: 30.5 PG (ref 26.6–33)
MCHC RBC AUTO-ENTMCNC: 33.1 G/DL (ref 31.5–35.7)
MCV RBC AUTO: 92.2 FL (ref 79–97)
MONOCYTES # BLD AUTO: 0.35 10*3/MM3 (ref 0.1–0.9)
MONOCYTES NFR BLD AUTO: 10 % (ref 5–12)
NEUTROPHILS NFR BLD AUTO: 1.85 10*3/MM3 (ref 1.7–7)
NEUTROPHILS NFR BLD AUTO: 52.9 % (ref 42.7–76)
NRBC BLD AUTO-RTO: 0 /100 WBC (ref 0–0.2)
PLATELET # BLD AUTO: 266 10*3/MM3 (ref 140–450)
PMV BLD AUTO: 10.2 FL (ref 6–12)
POTASSIUM SERPL-SCNC: 4.8 MMOL/L (ref 3.5–5.2)
PROT SERPL-MCNC: 6.9 G/DL (ref 6–8.5)
RBC # BLD AUTO: 3.87 10*6/MM3 (ref 3.77–5.28)
SODIUM SERPL-SCNC: 140 MMOL/L (ref 136–145)
TRIGL SERPL-MCNC: 57 MG/DL (ref 0–150)
TSH SERPL DL<=0.05 MIU/L-ACNC: 0.95 UIU/ML (ref 0.27–4.2)
VLDLC SERPL-MCNC: 12 MG/DL (ref 5–40)
WBC # BLD AUTO: 3.5 10*3/MM3 (ref 3.4–10.8)

## 2021-07-16 PROCEDURE — 36415 COLL VENOUS BLD VENIPUNCTURE: CPT

## 2021-07-16 PROCEDURE — 80053 COMPREHEN METABOLIC PANEL: CPT

## 2021-07-16 PROCEDURE — 84443 ASSAY THYROID STIM HORMONE: CPT

## 2021-07-16 PROCEDURE — 82043 UR ALBUMIN QUANTITATIVE: CPT

## 2021-07-16 PROCEDURE — 80061 LIPID PANEL: CPT

## 2021-07-16 PROCEDURE — 83036 HEMOGLOBIN GLYCOSYLATED A1C: CPT

## 2021-07-16 PROCEDURE — 85025 COMPLETE CBC W/AUTO DIFF WBC: CPT

## 2021-08-19 ENCOUNTER — HOSPITAL ENCOUNTER (INPATIENT)
Facility: HOSPITAL | Age: 86
LOS: 3 days | Discharge: HOME | End: 2021-08-23
Attending: EMERGENCY MEDICINE | Admitting: INTERNAL MEDICINE

## 2021-08-19 ENCOUNTER — APPOINTMENT (OUTPATIENT)
Dept: CT IMAGING | Facility: HOSPITAL | Age: 86
End: 2021-08-19

## 2021-08-19 DIAGNOSIS — Z78.9 DECREASED ACTIVITIES OF DAILY LIVING (ADL): ICD-10-CM

## 2021-08-19 DIAGNOSIS — R10.84 GENERALIZED ABDOMINAL PAIN: ICD-10-CM

## 2021-08-19 DIAGNOSIS — K56.609 SMALL BOWEL OBSTRUCTION (HCC): Primary | ICD-10-CM

## 2021-08-19 DIAGNOSIS — R26.2 DIFFICULTY IN WALKING: ICD-10-CM

## 2021-08-19 LAB
ALBUMIN SERPL-MCNC: 4.7 G/DL (ref 3.5–5.2)
ALBUMIN/GLOB SERPL: 1.7 G/DL
ALP SERPL-CCNC: 89 U/L (ref 39–117)
ALT SERPL W P-5'-P-CCNC: 10 U/L (ref 1–33)
ANION GAP SERPL CALCULATED.3IONS-SCNC: 13.4 MMOL/L (ref 5–15)
AST SERPL-CCNC: 13 U/L (ref 1–32)
BASOPHILS # BLD AUTO: 0.02 10*3/MM3 (ref 0–0.2)
BASOPHILS NFR BLD AUTO: 0.3 % (ref 0–1.5)
BILIRUB SERPL-MCNC: 0.6 MG/DL (ref 0–1.2)
BUN SERPL-MCNC: 14 MG/DL (ref 8–23)
BUN/CREAT SERPL: 24.6 (ref 7–25)
CALCIUM SPEC-SCNC: 9.8 MG/DL (ref 8.6–10.5)
CHLORIDE SERPL-SCNC: 98 MMOL/L (ref 98–107)
CO2 SERPL-SCNC: 22.6 MMOL/L (ref 22–29)
CREAT SERPL-MCNC: 0.57 MG/DL (ref 0.57–1)
DEPRECATED RDW RBC AUTO: 50.1 FL (ref 37–54)
EOSINOPHIL # BLD AUTO: 0 10*3/MM3 (ref 0–0.4)
EOSINOPHIL NFR BLD AUTO: 0 % (ref 0.3–6.2)
ERYTHROCYTE [DISTWIDTH] IN BLOOD BY AUTOMATED COUNT: 15 % (ref 12.3–15.4)
GFR SERPL CREATININE-BSD FRML MDRD: 122 ML/MIN/1.73
GLOBULIN UR ELPH-MCNC: 2.7 GM/DL
GLUCOSE BLDC GLUCOMTR-MCNC: 155 MG/DL (ref 70–99)
GLUCOSE SERPL-MCNC: 195 MG/DL (ref 65–99)
HCT VFR BLD AUTO: 38.7 % (ref 34–46.6)
HGB BLD-MCNC: 13.1 G/DL (ref 12–15.9)
HOLD SPECIMEN: NORMAL
HOLD SPECIMEN: NORMAL
IMM GRANULOCYTES # BLD AUTO: 0.02 10*3/MM3 (ref 0–0.05)
IMM GRANULOCYTES NFR BLD AUTO: 0.3 % (ref 0–0.5)
LIPASE SERPL-CCNC: 25 U/L (ref 13–60)
LYMPHOCYTES # BLD AUTO: 0.43 10*3/MM3 (ref 0.7–3.1)
LYMPHOCYTES NFR BLD AUTO: 5.9 % (ref 19.6–45.3)
MCH RBC QN AUTO: 31 PG (ref 26.6–33)
MCHC RBC AUTO-ENTMCNC: 33.9 G/DL (ref 31.5–35.7)
MCV RBC AUTO: 91.5 FL (ref 79–97)
MONOCYTES # BLD AUTO: 0.32 10*3/MM3 (ref 0.1–0.9)
MONOCYTES NFR BLD AUTO: 4.4 % (ref 5–12)
NEUTROPHILS NFR BLD AUTO: 6.49 10*3/MM3 (ref 1.7–7)
NEUTROPHILS NFR BLD AUTO: 89.1 % (ref 42.7–76)
NRBC BLD AUTO-RTO: 0 /100 WBC (ref 0–0.2)
PLATELET # BLD AUTO: 254 10*3/MM3 (ref 140–450)
PMV BLD AUTO: 9.5 FL (ref 6–12)
POTASSIUM SERPL-SCNC: 4 MMOL/L (ref 3.5–5.2)
PROT SERPL-MCNC: 7.4 G/DL (ref 6–8.5)
RBC # BLD AUTO: 4.23 10*6/MM3 (ref 3.77–5.28)
SODIUM SERPL-SCNC: 134 MMOL/L (ref 136–145)
WBC # BLD AUTO: 7.28 10*3/MM3 (ref 3.4–10.8)
WHOLE BLOOD HOLD SPECIMEN: NORMAL

## 2021-08-19 PROCEDURE — 83690 ASSAY OF LIPASE: CPT | Performed by: EMERGENCY MEDICINE

## 2021-08-19 PROCEDURE — 80053 COMPREHEN METABOLIC PANEL: CPT | Performed by: EMERGENCY MEDICINE

## 2021-08-19 PROCEDURE — 25010000002 MORPHINE PER 10 MG: Performed by: EMERGENCY MEDICINE

## 2021-08-19 PROCEDURE — 85025 COMPLETE CBC W/AUTO DIFF WBC: CPT

## 2021-08-19 PROCEDURE — 82962 GLUCOSE BLOOD TEST: CPT

## 2021-08-19 PROCEDURE — 99284 EMERGENCY DEPT VISIT MOD MDM: CPT

## 2021-08-19 RX ORDER — SODIUM CHLORIDE 0.9 % (FLUSH) 0.9 %
10 SYRINGE (ML) INJECTION AS NEEDED
Status: DISCONTINUED | OUTPATIENT
Start: 2021-08-19 | End: 2021-08-23 | Stop reason: HOSPADM

## 2021-08-19 RX ORDER — MORPHINE SULFATE 2 MG/ML
2 INJECTION, SOLUTION INTRAMUSCULAR; INTRAVENOUS ONCE
Status: COMPLETED | OUTPATIENT
Start: 2021-08-19 | End: 2021-08-19

## 2021-08-19 RX ORDER — ARFORMOTEROL TARTRATE 15 UG/2ML
15 SOLUTION RESPIRATORY (INHALATION)
COMMUNITY
Start: 2021-07-12 | End: 2021-12-06

## 2021-08-19 RX ORDER — FOLIC ACID 1 MG/1
1 TABLET ORAL DAILY
COMMUNITY
Start: 2021-07-12 | End: 2022-05-17 | Stop reason: SDUPTHER

## 2021-08-19 RX ORDER — AMLODIPINE BESYLATE 10 MG/1
TABLET ORAL
Status: ON HOLD | COMMUNITY
Start: 2021-05-13 | End: 2021-08-20

## 2021-08-19 RX ORDER — ONDANSETRON 2 MG/ML
4 INJECTION INTRAMUSCULAR; INTRAVENOUS ONCE
Status: COMPLETED | OUTPATIENT
Start: 2021-08-19 | End: 2021-08-20

## 2021-08-19 RX ORDER — TIOTROPIUM BROMIDE INHALATION SPRAY 3.12 UG/1
2 SPRAY, METERED RESPIRATORY (INHALATION)
COMMUNITY
Start: 2021-05-13 | End: 2021-12-06 | Stop reason: SDUPTHER

## 2021-08-19 RX ORDER — BUDESONIDE 0.5 MG/2ML
0.5 INHALANT ORAL 2 TIMES DAILY
COMMUNITY
Start: 2021-07-12 | End: 2021-12-06

## 2021-08-19 RX ORDER — HYDROXYCHLOROQUINE SULFATE 200 MG/1
200 TABLET, FILM COATED ORAL 2 TIMES DAILY
COMMUNITY
Start: 2021-07-12

## 2021-08-19 RX ADMIN — SODIUM CHLORIDE 1000 ML: 9 INJECTION, SOLUTION INTRAVENOUS at 23:59

## 2021-08-19 RX ADMIN — MORPHINE SULFATE 2 MG: 2 INJECTION, SOLUTION INTRAMUSCULAR; INTRAVENOUS at 23:59

## 2021-08-20 ENCOUNTER — APPOINTMENT (OUTPATIENT)
Dept: CT IMAGING | Facility: HOSPITAL | Age: 86
End: 2021-08-20

## 2021-08-20 ENCOUNTER — APPOINTMENT (OUTPATIENT)
Dept: GENERAL RADIOLOGY | Facility: HOSPITAL | Age: 86
End: 2021-08-20

## 2021-08-20 PROBLEM — K56.609 SMALL BOWEL OBSTRUCTION: Status: ACTIVE | Noted: 2021-08-20

## 2021-08-20 LAB
ANION GAP SERPL CALCULATED.3IONS-SCNC: 12.2 MMOL/L (ref 5–15)
BACTERIA UR QL AUTO: ABNORMAL /HPF
BASOPHILS # BLD AUTO: 0.03 10*3/MM3 (ref 0–0.2)
BASOPHILS NFR BLD AUTO: 0.4 % (ref 0–1.5)
BILIRUB UR QL STRIP: NEGATIVE
BUN SERPL-MCNC: 10 MG/DL (ref 8–23)
BUN/CREAT SERPL: 19.2 (ref 7–25)
CALCIUM SPEC-SCNC: 8.7 MG/DL (ref 8.6–10.5)
CHLORIDE SERPL-SCNC: 100 MMOL/L (ref 98–107)
CLARITY UR: CLEAR
CO2 SERPL-SCNC: 23.8 MMOL/L (ref 22–29)
COLOR UR: YELLOW
CREAT SERPL-MCNC: 0.52 MG/DL (ref 0.57–1)
DEPRECATED RDW RBC AUTO: 50.6 FL (ref 37–54)
EOSINOPHIL # BLD AUTO: 0 10*3/MM3 (ref 0–0.4)
EOSINOPHIL NFR BLD AUTO: 0 % (ref 0.3–6.2)
ERYTHROCYTE [DISTWIDTH] IN BLOOD BY AUTOMATED COUNT: 14.9 % (ref 12.3–15.4)
GFR SERPL CREATININE-BSD FRML MDRD: 136 ML/MIN/1.73
GLUCOSE BLDC GLUCOMTR-MCNC: 101 MG/DL (ref 70–99)
GLUCOSE BLDC GLUCOMTR-MCNC: 119 MG/DL (ref 70–99)
GLUCOSE BLDC GLUCOMTR-MCNC: 89 MG/DL (ref 70–99)
GLUCOSE SERPL-MCNC: 170 MG/DL (ref 65–99)
GLUCOSE UR STRIP-MCNC: NEGATIVE MG/DL
HCT VFR BLD AUTO: 37.2 % (ref 34–46.6)
HGB BLD-MCNC: 12.4 G/DL (ref 12–15.9)
HGB UR QL STRIP.AUTO: ABNORMAL
HYALINE CASTS UR QL AUTO: ABNORMAL /LPF
IMM GRANULOCYTES # BLD AUTO: 0.03 10*3/MM3 (ref 0–0.05)
IMM GRANULOCYTES NFR BLD AUTO: 0.4 % (ref 0–0.5)
KETONES UR QL STRIP: NEGATIVE
LEUKOCYTE ESTERASE UR QL STRIP.AUTO: ABNORMAL
LYMPHOCYTES # BLD AUTO: 0.63 10*3/MM3 (ref 0.7–3.1)
LYMPHOCYTES NFR BLD AUTO: 7.6 % (ref 19.6–45.3)
MAGNESIUM SERPL-MCNC: 2 MG/DL (ref 1.6–2.4)
MCH RBC QN AUTO: 30.5 PG (ref 26.6–33)
MCHC RBC AUTO-ENTMCNC: 33.3 G/DL (ref 31.5–35.7)
MCV RBC AUTO: 91.6 FL (ref 79–97)
MONOCYTES # BLD AUTO: 0.81 10*3/MM3 (ref 0.1–0.9)
MONOCYTES NFR BLD AUTO: 9.7 % (ref 5–12)
NEUTROPHILS NFR BLD AUTO: 6.81 10*3/MM3 (ref 1.7–7)
NEUTROPHILS NFR BLD AUTO: 81.9 % (ref 42.7–76)
NITRITE UR QL STRIP: NEGATIVE
NRBC BLD AUTO-RTO: 0 /100 WBC (ref 0–0.2)
PH UR STRIP.AUTO: 7.5 [PH] (ref 5–8)
PHOSPHATE SERPL-MCNC: 3.1 MG/DL (ref 2.5–4.5)
PLATELET # BLD AUTO: 233 10*3/MM3 (ref 140–450)
PMV BLD AUTO: 9.6 FL (ref 6–12)
POTASSIUM SERPL-SCNC: 3.7 MMOL/L (ref 3.5–5.2)
PROT UR QL STRIP: NEGATIVE
RBC # BLD AUTO: 4.06 10*6/MM3 (ref 3.77–5.28)
RBC # UR: ABNORMAL /HPF
REF LAB TEST METHOD: ABNORMAL
SODIUM SERPL-SCNC: 136 MMOL/L (ref 136–145)
SP GR UR STRIP: 1.02 (ref 1–1.03)
SQUAMOUS #/AREA URNS HPF: ABNORMAL /HPF
UROBILINOGEN UR QL STRIP: ABNORMAL
WBC # BLD AUTO: 8.31 10*3/MM3 (ref 3.4–10.8)
WBC UR QL AUTO: ABNORMAL /HPF

## 2021-08-20 PROCEDURE — 97166 OT EVAL MOD COMPLEX 45 MIN: CPT

## 2021-08-20 PROCEDURE — 82962 GLUCOSE BLOOD TEST: CPT

## 2021-08-20 PROCEDURE — 94799 UNLISTED PULMONARY SVC/PX: CPT

## 2021-08-20 PROCEDURE — 0 IOPAMIDOL PER 1 ML: Performed by: EMERGENCY MEDICINE

## 2021-08-20 PROCEDURE — 25010000002 ONDANSETRON PER 1 MG: Performed by: EMERGENCY MEDICINE

## 2021-08-20 PROCEDURE — 84100 ASSAY OF PHOSPHORUS: CPT | Performed by: HOSPITALIST

## 2021-08-20 PROCEDURE — 94640 AIRWAY INHALATION TREATMENT: CPT

## 2021-08-20 PROCEDURE — 97161 PT EVAL LOW COMPLEX 20 MIN: CPT

## 2021-08-20 PROCEDURE — 36415 COLL VENOUS BLD VENIPUNCTURE: CPT | Performed by: HOSPITALIST

## 2021-08-20 PROCEDURE — 74177 CT ABD & PELVIS W/CONTRAST: CPT

## 2021-08-20 PROCEDURE — 80048 BASIC METABOLIC PNL TOTAL CA: CPT | Performed by: HOSPITALIST

## 2021-08-20 PROCEDURE — 25010000002 ENOXAPARIN PER 10 MG: Performed by: HOSPITALIST

## 2021-08-20 PROCEDURE — 25010000002 PROCHLORPERAZINE 10 MG/2ML SOLUTION: Performed by: PHYSICIAN ASSISTANT

## 2021-08-20 PROCEDURE — 85025 COMPLETE CBC W/AUTO DIFF WBC: CPT | Performed by: HOSPITALIST

## 2021-08-20 PROCEDURE — 99221 1ST HOSP IP/OBS SF/LOW 40: CPT | Performed by: SURGERY

## 2021-08-20 PROCEDURE — 99223 1ST HOSP IP/OBS HIGH 75: CPT | Performed by: HOSPITALIST

## 2021-08-20 PROCEDURE — 83735 ASSAY OF MAGNESIUM: CPT | Performed by: HOSPITALIST

## 2021-08-20 PROCEDURE — 81001 URINALYSIS AUTO W/SCOPE: CPT | Performed by: EMERGENCY MEDICINE

## 2021-08-20 PROCEDURE — 74018 RADEX ABDOMEN 1 VIEW: CPT

## 2021-08-20 RX ORDER — AMLODIPINE BESYLATE 10 MG/1
10 TABLET ORAL DAILY
COMMUNITY
End: 2021-08-23 | Stop reason: HOSPADM

## 2021-08-20 RX ORDER — SODIUM CHLORIDE 0.9 % (FLUSH) 0.9 %
10 SYRINGE (ML) INJECTION EVERY 12 HOURS SCHEDULED
Status: DISCONTINUED | OUTPATIENT
Start: 2021-08-20 | End: 2021-08-23 | Stop reason: HOSPADM

## 2021-08-20 RX ORDER — NITROGLYCERIN 0.4 MG/1
0.4 TABLET SUBLINGUAL
Status: DISCONTINUED | OUTPATIENT
Start: 2021-08-20 | End: 2021-08-23 | Stop reason: HOSPADM

## 2021-08-20 RX ORDER — ACETAMINOPHEN 325 MG/1
650 TABLET ORAL EVERY 6 HOURS PRN
Status: DISCONTINUED | OUTPATIENT
Start: 2021-08-20 | End: 2021-08-23 | Stop reason: HOSPADM

## 2021-08-20 RX ORDER — DEXTROSE AND SODIUM CHLORIDE 5; .9 G/100ML; G/100ML
50 INJECTION, SOLUTION INTRAVENOUS CONTINUOUS
Status: DISCONTINUED | OUTPATIENT
Start: 2021-08-20 | End: 2021-08-23 | Stop reason: HOSPADM

## 2021-08-20 RX ORDER — ARFORMOTEROL TARTRATE 15 UG/2ML
15 SOLUTION RESPIRATORY (INHALATION)
Status: DISCONTINUED | OUTPATIENT
Start: 2021-08-20 | End: 2021-08-23 | Stop reason: HOSPADM

## 2021-08-20 RX ORDER — LOSARTAN POTASSIUM 50 MG/1
50 TABLET ORAL
Status: DISCONTINUED | OUTPATIENT
Start: 2021-08-20 | End: 2021-08-23 | Stop reason: HOSPADM

## 2021-08-20 RX ORDER — SODIUM CHLORIDE 9 MG/ML
100 INJECTION, SOLUTION INTRAVENOUS CONTINUOUS
Status: DISCONTINUED | OUTPATIENT
Start: 2021-08-20 | End: 2021-08-20

## 2021-08-20 RX ORDER — BUDESONIDE 0.5 MG/2ML
0.5 INHALANT ORAL
Status: DISCONTINUED | OUTPATIENT
Start: 2021-08-20 | End: 2021-08-23 | Stop reason: HOSPADM

## 2021-08-20 RX ORDER — SODIUM CHLORIDE 0.9 % (FLUSH) 0.9 %
10 SYRINGE (ML) INJECTION AS NEEDED
Status: DISCONTINUED | OUTPATIENT
Start: 2021-08-20 | End: 2021-08-23 | Stop reason: HOSPADM

## 2021-08-20 RX ORDER — MORPHINE SULFATE 2 MG/ML
1 INJECTION, SOLUTION INTRAMUSCULAR; INTRAVENOUS
Status: DISCONTINUED | OUTPATIENT
Start: 2021-08-20 | End: 2021-08-23 | Stop reason: HOSPADM

## 2021-08-20 RX ORDER — NICOTINE POLACRILEX 4 MG
15 LOZENGE BUCCAL
Status: DISCONTINUED | OUTPATIENT
Start: 2021-08-20 | End: 2021-08-23 | Stop reason: HOSPADM

## 2021-08-20 RX ORDER — PROCHLORPERAZINE EDISYLATE 5 MG/ML
2.5 INJECTION INTRAMUSCULAR; INTRAVENOUS ONCE
Status: COMPLETED | OUTPATIENT
Start: 2021-08-20 | End: 2021-08-20

## 2021-08-20 RX ORDER — DEXTROSE MONOHYDRATE 100 MG/ML
25 INJECTION, SOLUTION INTRAVENOUS
Status: DISCONTINUED | OUTPATIENT
Start: 2021-08-20 | End: 2021-08-23 | Stop reason: HOSPADM

## 2021-08-20 RX ADMIN — ONDANSETRON 4 MG: 2 INJECTION INTRAMUSCULAR; INTRAVENOUS at 00:00

## 2021-08-20 RX ADMIN — PROCHLORPERAZINE EDISYLATE 2.5 MG: 5 INJECTION INTRAMUSCULAR; INTRAVENOUS at 03:55

## 2021-08-20 RX ADMIN — LOSARTAN POTASSIUM 50 MG: 50 TABLET, FILM COATED ORAL at 09:22

## 2021-08-20 RX ADMIN — BUDESONIDE 0.5 MG: 0.5 INHALANT ORAL at 08:57

## 2021-08-20 RX ADMIN — DEXTROSE AND SODIUM CHLORIDE 100 ML/HR: 5; 900 INJECTION, SOLUTION INTRAVENOUS at 18:31

## 2021-08-20 RX ADMIN — SODIUM CHLORIDE 100 ML/HR: 9 INJECTION, SOLUTION INTRAVENOUS at 03:56

## 2021-08-20 RX ADMIN — TIOTROPIUM BROMIDE INHALATION SPRAY 2 PUFF: 3.12 SPRAY, METERED RESPIRATORY (INHALATION) at 09:02

## 2021-08-20 RX ADMIN — SODIUM CHLORIDE, PRESERVATIVE FREE 10 ML: 5 INJECTION INTRAVENOUS at 03:56

## 2021-08-20 RX ADMIN — IOPAMIDOL 100 ML: 755 INJECTION, SOLUTION INTRAVENOUS at 00:21

## 2021-08-20 RX ADMIN — ARFORMOTEROL TARTRATE 15 MCG: 15 SOLUTION RESPIRATORY (INHALATION) at 21:34

## 2021-08-20 RX ADMIN — ENOXAPARIN SODIUM 30 MG: 30 INJECTION, SOLUTION INTRAVENOUS; SUBCUTANEOUS at 09:22

## 2021-08-20 RX ADMIN — ARFORMOTEROL TARTRATE 15 MCG: 15 SOLUTION RESPIRATORY (INHALATION) at 08:57

## 2021-08-20 RX ADMIN — ACETAMINOPHEN 650 MG: 325 TABLET ORAL at 18:31

## 2021-08-20 RX ADMIN — SODIUM CHLORIDE, PRESERVATIVE FREE 10 ML: 5 INJECTION INTRAVENOUS at 09:22

## 2021-08-20 RX ADMIN — BUDESONIDE 0.5 MG: 0.5 INHALANT ORAL at 21:34

## 2021-08-20 RX ADMIN — SODIUM CHLORIDE, PRESERVATIVE FREE 10 ML: 5 INJECTION INTRAVENOUS at 21:13

## 2021-08-20 NOTE — THERAPY EVALUATION
Acute Care - Physical Therapy Initial Evaluation   [facility]     Patient Name: Annita Rodriguez  : 1935  MRN: [id number]  Today's Date: 2021   Admit date: 2021     Referring Physician: Alexandr Hammer, *     Surgery Date:* No surgery found *               Visit Dx:     ICD-10-CM ICD-9-CM   1. Small bowel obstruction (CMS/HCC)  K56.609 560.9   2. Generalized abdominal pain  R10.84 789.07   3. Difficulty in walking  R26.2 719.7     Patient Active Problem List   Diagnosis   • Small bowel obstruction (CMS/HCC)     Past Medical History:   Diagnosis Date   • Arthritis    • Diabetes mellitus (CMS/HCC)    • Hypertension      Past Surgical History:   Procedure Laterality Date   • HYSTERECTOMY     • OOPHORECTOMY Bilateral    • VEIN LIGATION AND STRIPPING          PT Assessment (last 12 hours)      PT Evaluation and Treatment     Row Name 21          Physical Therapy Time and Intention    Subjective Information  no complaints  -MARIO ALBERTO     Document Type  evaluation  -MARIO ALBERTO     Mode of Treatment  individual therapy;physical therapy  -MARIO ALBERTO     Row Name 21 09          General Information    Patient Observations  alert;cooperative;agree to therapy  -MARIO ALBERTO     Prior Level of Function  independent:;all household mobility;community mobility  -MARIO ALBERTO     Equipment Currently Used at Home  none  -MARIO ALBERTO     Barriers to Rehab  none identified  -MARIO ALBERTO     Row Name 21 09          Living Environment    Current Living Arrangements  home/apartment/condo  -MARIO ALBERTO     Lives With  child(maricruz), adult  -MARIO ALBERTO     Row Name 21          Range of Motion (ROM)    Range of Motion  ROM is WFL  -MARIO ALBERTO     Row Name 21          Strength (Manual Muscle Testing)    Strength (Manual Muscle Testing)  strength is WFL  -MARIO ALBERTO     Row Name 21          Bed Mobility    Bed Mobility  bed mobility (all) activities  -MARIO ALBERTO     All Activities, [place] (Bed Mobility)  independent  -MARIO ALBERTO     Row Name 21           Transfers    Transfers  sit-stand transfer  -MARIO ALBERTO     Sit-Stand [place] (Transfers)  independent  -MARIO ALBERTO     Row Name 08/20/21 0902          Gait/Stairs (Locomotion)    Gait/Stairs Locomotion  gait/ambulation independence  -MARIO ALBERTO     [place] Level (Gait)  independent  -MARIO ALBERTO     Distance in Feet (Gait)  250  -MARIO ALBERTO     Row Name 08/20/21 0902          Balance    Balance Assessment  standing dynamic balance  -MARIO ALBERTO     Dynamic Standing Balance  WFL  -MARIO ALBERTO     Row Name 08/20/21 0902          Plan of Care Review    Plan of Care Reviewed With  patient  -MARIO ALBERTO     Outcome Summary  Pt did not demonstrate any safety or mobility deficits during the initial evaluaiton. She is safe to continue ambulating independently within her room until her d/c from the hospital.  She will be discharged from PT services at this time.    -MARIO ALBERTO     Row Name 08/20/21 0902          Therapy Assessment/Plan (PT)    Patient/Family Therapy Goals Statement (PT)  Pt wants to go home independently  -MARIO ALBERTO     Criteria for Skilled Interventions Met (PT)  no problems identified which require skilled intervention  -MARIO ALBERTO     Row Name 08/20/21 0902          PT Evaluation Complexity    History, PT Evaluation Complexity  no personal factors and/or comorbidities  -MARIO ALBERTO     Examination of Body Systems (PT Eval Complexity)  total of 4 or more elements  -MARIO ALBERTO     Clinical Presentation (PT Evaluation Complexity)  stable  -MARIO ALBERTO     Clinical Decision Making (PT Evaluation Complexity)  low complexity  -MARIO ALBERTO     Overall Complexity (PT Evaluation Complexity)  low complexity  -MARIO ALBERTO     Row Name 08/20/21 0902          Therapy Plan Review/Discharge Plan (PT)    Therapy Plan Review (PT)  evaluation/treatment results reviewed;patient  -MARIO ALBERTO       User Byrne  (r) = Recorded By, (t) = Taken By, (c) = Cosigned By    Initials Name Provider Type    Kale Grace, PT Physical Therapist        Physical Therapy Education                 Title: PT OT SLP Therapies (Done)     Topic: Physical Therapy (Done)      Point: Mobility training (Done)     Learning Progress Summary           Patient Acceptance, E,TB, VU by MARIO ALBERTO at 8/20/2021 0927                   Point: Home exercise program (Done)     Learning Progress Summary           Patient Acceptance, E,TB, VU by MARIO ALBERTO at 8/20/2021 0927                   Point: Body mechanics (Done)     Learning Progress Summary           Patient Acceptance, E,TB, VU by MARIO ALBERTO at 8/20/2021 0927                   Point: Precautions (Done)     Learning Progress Summary           Patient Acceptance, E,TB, VU by MARIO ALBERTO at 8/20/2021 0927                               User Key     Initials Effective Dates Name Provider Type Discipline    MARIO ALBERTO 06/03/21 -  Kale Hernandez PT Physical Therapist PT              PT Recommendation and Plan  Anticipated Discharge Disposition (PT): home  Plan of Care Reviewed With: patient  Outcome Summary: Pt did not demonstrate any safety or mobility deficits during the initial evaluaiton. She is safe to continue ambulating independently within her room until her d/c from the hospital.  She will be discharged from PT services at this time.    Outcome Measures     Row Name 08/20/21 0900             How much help from another person do you currently need...    Turning from your back to your side while in flat bed without using bedrails?  4  -MARIO ALBERTO      Moving from lying on back to sitting on the side of a flat bed without bedrails?  4  -MARIO ALBERTO      Moving to and from a bed to a chair (including a wheelchair)?  4  -MARIO ALBERTO      Standing up from a chair using your arms (e.g., wheelchair, bedside chair)?  4  -MARIO ALBERTO      Climbing 3-5 steps with a railing?  4  -MARIO ALBERTO      To walk in hospital room?  4  -MARIO ALBERTO      AM-PAC 6 Clicks Score (PT)  24  -MARIO ALBERTO         Functional Assessment    Outcome Measure Options  AM-PAC 6 Clicks Basic Mobility (PT)  -MARIO ALBERTO        User Key  (r) = Recorded By, (t) = Taken By, (c) = Cosigned By    Initials Name Provider Type    Kale Grace PT Physical Therapist           Time  Calculation:   PT Charges     Row Name 08/20/21 0923             Time Calculation    PT Received On  08/20/21  -MARIO ALBERTO         Untimed Charges    PT Eval/Re-eval Minutes  20  -MARIO ALBERTO         Total Minutes    Untimed Charges Total Minutes  20  -MARIO ALBERTO       Total Minutes  20  -MARIO ALBERTO        User Key  (r) = Recorded By, (t) = Taken By, (c) = Cosigned By    Initials Name Provider Type    Kale Grace, PT Physical Therapist        Therapy Charges for Today     Code Description Service Date Service Provider Modifiers Qty    77699072987 HC PT EVAL LOW COMPLEXITY 2 8/20/2021 Kale Hernandez, PT GP 1          PT G-Codes  Outcome Measure Options: AM-PAC 6 Clicks Basic Mobility (PT)  AM-PAC 6 Clicks Score (PT): 24    Kale Hernandez PT  8/20/2021

## 2021-08-20 NOTE — PLAN OF CARE
Goal Outcome Evaluation:  Plan of Care Reviewed With: patient           Outcome Summary: Pt did not demonstrate any safety or mobility deficits during the initial evaluaiton. She is safe to continue ambulating independently within her room until her d/c from the hospital.  She will be discharged from PT services at this time.

## 2021-08-20 NOTE — PLAN OF CARE
Goal Outcome Evaluation:           Progress: no change (For session for evaluation)  Outcome Summary: Patient presents with limitations of endurance/activity tolerance and balance which impede her ability to perform ADL and transfers as prior.  The skills of a therapist will be required to safely and effectively implement treatment plan to restore maximal level of function.

## 2021-08-20 NOTE — CONSULTS
[facility]   CONSULT    Patient Name: Annita Rodriguez  : 1935  MRN: [id number]  Primary Care Physician:  Arias Beck MD  Date of admission: 2021    Subjective   Subjective     Chief Complaint: abdominal pain    HPI:    Annita Rodriguez is a 86 y.o. female who presented to the ED at [facility] overnight with a one day history of constipation, abdominal pain, distention, nausea, and vomiting.  She denies fever or chills.  She had a CT scan that indicates she has a bowel obstruction.  She has a PMH of a bowel obstruction in January of this year.  She reports since having a NG tube placed she is no longer having any abdominal pain.               Personal History     Past Medical History:   Diagnosis Date   • Arthritis    • Diabetes mellitus (CMS/HCC)    • Hypertension        Past Surgical History:   Procedure Laterality Date   • HYSTERECTOMY     • OOPHORECTOMY Bilateral    • VEIN LIGATION AND STRIPPING     Open Cholecystectomy    Family History: family history is not on file. Otherwise pertinent FHx was reviewed and not pertinent to current issue.    Social History:  reports that she has quit smoking. Her smoking use included cigarettes. She has a 3.75 pack-year smoking history. She has never used smokeless tobacco. She reports that she does not drink alcohol and does not use drugs.    Home Medications:  arformoterol, budesonide, folic acid, hydroxychloroquine, metFORMIN, methotrexate, and tiotropium bromide monohydrate      Allergies:  Allergies   Allergen Reactions   • Sulfa Antibiotics Nausea And Vomiting       Objective   Objective     Vitals:   Temp:  [98.2 °F (36.8 °C)-99.2 °F (37.3 °C)] 98.9 °F (37.2 °C)  Heart Rate:  [88-90] 88  Resp:  [16-19] 16  BP: (138-152)/(60-69) 152/63      Review of Systems  All systems were reviewed and negative except for:   Constitutional:  Denies fever or chills  Cardiovascular:  Denies chest pain  Respiratory: Denies cough or shortness of  air  Gastrointestinal:  Admits abdominal pain, constipation, nausea, and vomiting          Physical Exam    Constitutional: Awake, alert   Eyes: PERRLA, wearing glasses   Neck: Supple, trachea midline   Respiratory: respirations even and unlabored   Cardiovascular: RRR   Gastrointestinal: soft, nontender, nondistended   Psychiatric: Appropriate affect, cooperative   Neurologic: Oriented x 3, speech clear   Skin: No rashes     Result Review    Result Review:  I have personally reviewed the results from the time of this admission to 8/20/2021 08:52 EDT and agree with these findings:  [x]  Laboratory  []  Microbiology  [x]  Radiology  []  EKG/Telemetry   []  Cardiology/Vascular   []  Pathology  []  Old records  []  Other:      Assessment/Plan   Assessment / Plan   DIAGNOSIS   Bowel obstruction      Active Hospital Problems:  Active Hospital Problems    Diagnosis    • Small bowel obstruction (CMS/HCC)          Plan:     Cont NG tube  Ambulate in hallway and up in chair  May have ice chips  Xray in AM    Pt seen and examined.  CT scan reviewed.  No surgical indication at this time.  Agree w/ above.

## 2021-08-20 NOTE — CONSULTS
"Nutrition Services    Patient Name: Annita Rodriguez  YOB: 1935  MRN: [id number]  Admission date: 8/19/2021      CLINICAL NUTRITION ASSESSMENT      Reason for Assessment  MST score 2+     H&P:    Past Medical History:   Diagnosis Date   • Arthritis    • Diabetes mellitus (CMS/HCC)    • Hypertension         Current Problems:   Active Hospital Problems    Diagnosis    • Small bowel obstruction (CMS/HCC)         Nutrition/Diet History         Narrative     Presented with abdominal pain, distention, nausea and vomiting constipation x1 day.  Plan for NG tube insertion today.       Anthropometrics        Current Height, Weight Height: 160 cm (63\")  Weight: 54.7 kg (120 lb 9.5 oz)   Current BMI Body mass index is 21.36 kg/m².       Weight Hx  Wt Readings from Last 30 Encounters:   08/20/21 0217 54.7 kg (120 lb 9.5 oz)   08/20/21 0112 54.7 kg (120 lb 9.5 oz)   08/19/21 2110 55.9 kg (123 lb 3.8 oz)   06/03/21 0821 55.8 kg (123 lb)   10/15/20 0000 57.8 kg (127 lb 7 oz)   09/17/20 0829 59 kg (130 lb 2 oz)   01/08/20 1115 60.9 kg (134 lb 4 oz)   08/29/19 0827 63.5 kg (140 lb)   01/04/19 1524 63.5 kg (140 lb)   03/01/18 0950 63.5 kg (140 lb)   02/19/18 0949 63.7 kg (140 lb 7 oz)            Wt Change Observation 2% wt loss/2 mos     Estimated/Assessed Needs       Energy Requirements    EST Needs (kcal/day) 1650kcal       Protein Requirements    EST Daily Needs (g/day) 55-66g protein       Fluid Requirements     Estimated Needs (mL/day) 1650ml      Labs/Medications         Pertinent Labs Reviewed.   Results from last 7 days   Lab Units 08/20/21  0459 08/19/21  2125   SODIUM mmol/L 136 134*   POTASSIUM mmol/L 3.7 4.0   CHLORIDE mmol/L 100 98   CO2 mmol/L 23.8 22.6   BUN mg/dL 10 14   CREATININE mg/dL 0.52* 0.57   CALCIUM mg/dL 8.7 9.8   BILIRUBIN mg/dL  --  0.6   ALK PHOS U/L  --  89   ALT (SGPT) U/L  --  10   AST (SGOT) U/L  --  13   GLUCOSE mg/dL 170* 195*     Results from last 7 days   Lab Units 08/20/21  6440 "   MAGNESIUM mg/dL 2.0   PHOSPHORUS mg/dL 3.1   HEMOGLOBIN g/dL 12.4   HEMATOCRIT % 37.2     No results found for: COVID19  Lab Results   Component Value Date    HGBA1C 6.40 (H) 07/16/2021         Pertinent Medications Reviewed.     Current Nutrition Orders & Evaluation of Intake       Oral Nutrition     Current PO Diet NPO Diet NPO Except: Ice Chips, Sips With Meds   Supplement No active supplement orders       Nutrition Diagnosis         Nutrition Dx Problem 1 Inadequate oral Intake related to GI dysfunction as evidenced by NPO       Nutrition Intervention         None at this time     Monitor/Evaluation        Monitor Diet advancement       Electronically signed by:  Anusha Hutchins RD  08/20/21 07:29 EDT

## 2021-08-20 NOTE — PLAN OF CARE
Goal Outcome Evaluation:           Progress: improving  Outcome Summary: No compaints of nausea/vomiting or pain. Pt up to chair with assistance. No new complaints  NATALEE JORDAN RN

## 2021-08-20 NOTE — THERAPY EVALUATION
Patient Name: Annita Rodriguez  : 1935    MRN: [id number]                              Today's Date: 2021       Admit Date: 2021    Visit Dx:     ICD-10-CM ICD-9-CM   1. Small bowel obstruction (CMS/HCC)  K56.609 560.9   2. Generalized abdominal pain  R10.84 789.07   3. Difficulty in walking  R26.2 719.7   4. Decreased activities of daily living (ADL)  Z78.9 V49.89     Patient Active Problem List   Diagnosis   • Small bowel obstruction (CMS/HCC)     Past Medical History:   Diagnosis Date   • Arthritis    • Diabetes mellitus (CMS/HCC)    • Hypertension      Past Surgical History:   Procedure Laterality Date   • HYSTERECTOMY     • OOPHORECTOMY Bilateral    • VEIN LIGATION AND STRIPPING       General Information     Row Name 2147          OT Time and Intention    Document Type  evaluation  -AV     Mode of Treatment  individual therapy;occupational therapy  -AV     Row Name 2147          General Information    Patient Profile Reviewed  yes  -AV     Prior Level of Function  independent:;ADL's;home management;all household mobility Sits to bathe/no DME.  Stands at sink to groom.  Regular commode.  Ambulates without device.  No home oxygen.  -AV     Existing Precautions/Restrictions  fall  -AV     Barriers to Rehab  none identified  -AV     Row Name 2147          Occupational Profile    Reason for Services/Referral (Occupational Profile)  Patient is a 86 year old female admitted to [facility] on 2021.   OT consulted due to recent decline in ADL/transfer independence.  No previous OT services for current condition.  -AV     Row Name 2147          Living Environment    Lives With  child(maricruz), adult Daughter  -AV     Row Name 2147          Cognition    Orientation Status (Cognition)  -- Patient is alert, pleasant and cooperative- able to retain information and follow commands.  -AV     Row Name 2147          Safety Issues, Functional  Mobility    Impairments Affecting Function (Mobility)  balance;endurance/activity tolerance  -AV       User Key  (r) = Recorded By, (t) = Taken By, (c) = Cosigned By    Initials Name Provider Type    Yobany Le OT Occupational Therapist          Mobility/ADL's     Row Name 08/20/21 0956          Transfers    Sit-Stand [place] (Transfers)  contact guard  -AV     Row Name 08/20/21 0956          Activities of Daily Living    BADL Assessment/Intervention  -- NPO. CG/min assist ADLs.  -AV       User Key  (r) = Recorded By, (t) = Taken By, (c) = Cosigned By    Initials Name Provider Type    Yobany Le OT Occupational Therapist        Obj/Interventions     Row Name 08/20/21 0957          Sensory Assessment (Somatosensory)    Sensory Assessment (Somatosensory)  UE sensation intact  -AV     Row Name 08/20/21 0957          Vision Assessment/Intervention    Visual Impairment/Limitations  WFL;corrective lenses full-time  -AV     Row Name 08/20/21 0957          Range of Motion Comprehensive    General Range of Motion  bilateral upper extremity ROM WFL  -AV     Row Name 08/20/21 0957          Strength Comprehensive (MMT)    Comment, General Manual Muscle Testing (MMT) Assessment  4/5 upper extremities  -AV     Row Name 08/20/21 0957          Motor Skills    Motor Skills  coordination;functional endurance  -AV     Coordination  WFL Right dominant  -AV     Functional Endurance  poor+  -AV     Row Name 08/20/21 0957          Balance    Dynamic Standing Balance  mild impairment  -AV       User Key  (r) = Recorded By, (t) = Taken By, (c) = Cosigned By    Initials Name Provider Type    Yobany Le OT Occupational Therapist        Goals/Plan     Row Name 08/20/21 0959          Transfer Goal 1 (OT)    Activity/Assistive Device (Transfer Goal 1, OT)  transfers, all  -AV     [place] Level/Cues Needed (Transfer Goal 1, OT)  modified independence  -AV     Time Frame (Transfer Goal 1, OT)  long term goal  (LTG);10 days  -AV     Row Name 08/20/21 0959          Bathing Goal 1 (OT)    Activity/Device (Bathing Goal 1, OT)  bathing skills, all  -AV     [place] Level/Cues Needed (Bathing Goal 1, OT)  modified independence  -AV     Time Frame (Bathing Goal 1, OT)  long term goal (LTG);10 days  -AV     Row Name 08/20/21 0959          Dressing Goal 1 (OT)    Activity/Device (Dressing Goal 1, OT)  dressing skills, all  -AV     [place]/Cues Needed (Dressing Goal 1, OT)  modified independence  -AV     Time Frame (Dressing Goal 1, OT)  long term goal (LTG);10 days  -AV     Row Name 08/20/21 0959          Toileting Goal 1 (OT)    Activity/Device (Toileting Goal 1, OT)  toileting skills, all  -AV     [place] Level/Cues Needed (Toileting Goal 1, OT)  modified independence  -AV     Time Frame (Toileting Goal 1, OT)  long term goal (LTG);10 days  -AV     Row Name 08/20/21 0959          Grooming Goal 1 (OT)    Activity/Device (Grooming Goal 1, OT)  grooming skills, all  -AV     [place] (Grooming Goal 1, OT)  modified independence  -AV     Time Frame (Grooming Goal 1, OT)  long term goal (LTG);10 days  -AV     Row Name 08/20/21 0959          Therapy Assessment/Plan (OT)    Planned Therapy Interventions (OT)  activity tolerance training;BADL retraining;functional balance retraining;IADL retraining;occupation/activity based interventions;patient/caregiver education/training;transfer/mobility retraining  -AV       User Key  (r) = Recorded By, (t) = Taken By, (c) = Cosigned By    Initials Name Provider Type    Yobany Le OT Occupational Therapist        Clinical Impression     Row Name 08/20/21 0958          Pain Assessment    Additional Documentation  Pain Scale: Numbers Pre/Post-Treatment (Group)  -     Row Name 08/20/21 0958          Pain Scale: Numbers Pre/Post-Treatment    Pretreatment Pain Rating  0/10 - no pain  -AV     Posttreatment Pain Rating  0/10 - no pain  -     Row Name 08/20/21 0991           Plan of Care Review    Progress  no change For session for evaluation  -AV     Outcome Summary  Patient presents with limitations of endurance/activity tolerance and balance which impede her ability to perform ADL and transfers as prior.  The skills of a therapist will be required to safely and effectively implement treatment plan to restore maximal level of function.  -AV     Row Name 08/20/21 0958          Therapy Assessment/Plan (OT)    Patient/Family Therapy Goal Statement (OT)  Maximize independence  -AV     Rehab Potential (OT)  good, to achieve stated therapy goals  -AV     Criteria for Skilled Therapeutic Interventions Met (OT)  yes;meets criteria;skilled treatment is necessary  -AV     Therapy Frequency (OT)  5 times/wk  -AV     Row Name 08/20/21 0958          Therapy Plan Review/Discharge Plan (OT)    Anticipated Discharge Disposition (OT)  home with assist;home with home health  -AV     Row Name 08/20/21 0958          Vital Signs    O2 Delivery Pre Treatment  room air  -AV     O2 Delivery Intra Treatment  room air  -AV     O2 Delivery Post Treatment  room air  -AV       User Key  (r) = Recorded By, (t) = Taken By, (c) = Cosigned By    Initials Name Provider Type    AV Yobany Llamas, OT Occupational Therapist        Outcome Measures     Row Name 08/20/21 1000          How much help from another is currently needed...    Putting on and taking off regular lower body clothing?  3  -AV     Bathing (including washing, rinsing, and drying)  3  -AV     Toileting (which includes using toilet bed pan or urinal)  3  -AV     Putting on and taking off regular upper body clothing  4  -AV     Taking care of personal grooming (such as brushing teeth)  4  -AV     Eating meals  4  -AV     AM-PAC 6 Clicks Score (OT)  21  -AV     Row Name 08/20/21 0900          How much help from another person do you currently need...    Turning from your back to your side while in flat bed without using bedrails?  4  -MARIO ALBERTO     Moving from  lying on back to sitting on the side of a flat bed without bedrails?  4  -MARIO ALBERTO     Moving to and from a bed to a chair (including a wheelchair)?  4  -MARIO ALBERTO     Standing up from a chair using your arms (e.g., wheelchair, bedside chair)?  4  -MARIO ALBERTO     Climbing 3-5 steps with a railing?  4  -MARIO ALBERTO     To walk in hospital room?  4  -MARIO ALBERTO     AM-PAC 6 Clicks Score (PT)  24  -MARIO ALBERTO     Row Name 08/20/21 1000 08/20/21 0900       Functional Assessment    Outcome Measure Options  AM-PAC 6 Clicks Daily Activity (OT);Optimal Instrument  -AV  AM-PAC 6 Clicks Basic Mobility (PT)  -MARIO ALBERTO    Row Name 08/20/21 1000          Optimal Instrument    Optimal Instrument  Optimal - 3  -AV     Bending/Stooping  1  -AV     Standing  2  -AV     Reaching  1  -AV     From the list, choose the 3 activities you would most like to be able to do without any difficulty  Bending/stooping;Standing;Reaching  -AV     Total Score Optimal - 3  4  -AV       User Key  (r) = Recorded By, (t) = Taken By, (c) = Cosigned By    Initials Name Provider Type    AV Yobany Llamas, OT Occupational Therapist    Kale Grace, PT Physical Therapist          Occupational Therapy Education                 Title: PT OT SLP Therapies (In Progress)     Topic: Occupational Therapy (Not Started)     Point: ADL training (Not Started)     Description:   Instruct learner(s) on proper safety adaptation and remediation techniques during self care or transfers.   Instruct in proper use of assistive devices.              Learner Progress:  Not documented in this visit.          Point: Home exercise program (Not Started)     Description:   Instruct learner(s) on appropriate technique for monitoring, assisting and/or progressing therapeutic exercises/activities.              Learner Progress:  Not documented in this visit.          Point: Precautions (Not Started)     Description:   Instruct learner(s) on prescribed precautions during self-care and functional transfers.              Learner  Progress:  Not documented in this visit.          Point: Body mechanics (Not Started)     Description:   Instruct learner(s) on proper positioning and spine alignment during self-care, functional mobility activities and/or exercises.              Learner Progress:  Not documented in this visit.                          OT Recommendation and Plan  Planned Therapy Interventions (OT): activity tolerance training, BADL retraining, functional balance retraining, IADL retraining, occupation/activity based interventions, patient/caregiver education/training, transfer/mobility retraining  Therapy Frequency (OT): 5 times/wk  Plan of Care Review  Progress: no change (For session for evaluation)  Outcome Summary: Patient presents with limitations of endurance/activity tolerance and balance which impede her ability to perform ADL and transfers as prior.  The skills of a therapist will be required to safely and effectively implement treatment plan to restore maximal level of function.     Time Calculation:   Time Calculation- OT     Row Name 08/20/21 1001             Time Calculation- OT    OT Received On  08/20/21  -AV      OT Goal Re-Cert Due Date  08/29/21  -AV         Untimed Charges    OT Eval/Re-eval Minutes  25  -AV         Total Minutes    Untimed Charges Total Minutes  25  -AV       Total Minutes  25  -AV        User Key  (r) = Recorded By, (t) = Taken By, (c) = Cosigned By    Initials Name Provider Type    AV Yobany Llamas OT Occupational Therapist        Therapy Charges for Today     Code Description Service Date Service Provider Modifiers Qty    33126962832 HC OT EVAL MOD COMPLEXITY 2 8/20/2021 Yobany Llamas OT GO 1               Yobany Llamas OT  8/20/2021

## 2021-08-20 NOTE — PLAN OF CARE
Goal Outcome Evaluation:  Plan of Care Reviewed With: patient, daughter        Progress: no change  Outcome Summary: PT NEW ADMIT TONIGHT FOR SBO, PT HAS A HX OF SBO, PAIN AND NAUSEA WELL CONTROLLED.Ananya Veloz RN

## 2021-08-20 NOTE — PROGRESS NOTES
[facility]   Hospitalist Progress Note  Date: 2021  Patient Name: Annita Rodriguez  : 1935  MRN: [id number]  Date of admission: 2021      Subjective   Subjective     Chief Complaint: Constipation    Summary: Patient is an 86-year-old female that presents with constipation for 1 day.  She has had abdominal pain, distention, nausea, and vomiting. Her CT scan shows: Multiple dilated loops of fluid-filled small bowel with surrounding fat stranding.  This is consistent with bowel obstruction and appears to be proximal and mid bowel loops involved with transition point in the right mid abdomen.  The ileum and colon are decompressed.  Dr. Oden  was consulted they will follow the patient.  If possible, daughter would like to avoid surgery.  There is concern for adhesions.     She was in the hospital in January for similar situation.  At that time, surgery was consulted and they recommended placing a NG tube.     Patient's past medical history is significant for diabetes type 2, COPD/asthma, and history of tobacco abuse.           Interval Followup: Patient reports feeling better this morning.  She reports passing  flatus.  She denies abdominal pain nausea or vomiting.  No issues reported overnight per patient RN.    Review of Systems   All systems were reviewed and negative except for: As noted in HPI    Objective   Objective     Vitals:   Temp:  [98.2 °F (36.8 °C)-99.2 °F (37.3 °C)] 98.9 °F (37.2 °C)  Heart Rate:  [82-90] 83  Resp:  [16-19] 16  BP: (138-152)/(60-69) 152/63  Physical Exam                         Constitutional: Awake, alert, no acute distress              Eyes: Pupils equal, sclerae anicteric, no conjunctival injection              HENT: NCAT, mucous membranes moist.  NG tube in place              Neck: Supple, no thyromegaly, no lymphadenopathy, trachea midline              Respiratory: Clear to auscultation bilaterally, nonlabored respirations               Cardiovascular: RRR,  no murmurs, rubs, or gallops, palpable pedal pulses bilaterally              Gastrointestinal:  soft, nontender, nondistended              Musculoskeletal: No bilateral ankle edema, no clubbing or cyanosis to extremities              Psychiatric: Appropriate affect, cooperative              Neurologic: Oriented x 3, moves all extremities no focal weakness appreciated, Cranial Nerves grossly intact, speech clear              Skin: No rashes         Result Review    Result Review:  I have personally reviewed the results from the time of this admission to 8/20/2021 09:34 EDT and agree with these findings:  [x]  Laboratory  [x]  Microbiology  [x]  Radiology  []  EKG/Telemetry   []  Cardiology/Vascular   []  Pathology  []  Old records  [x]  Other: Medications  Assessment/Plan   Assessment / Plan   Assessment/Plan:   #1 small bowel obstruction secondary to adhesions most likely  - CT today: Multiple dilated loops of fluid-filled small bowel with surrounding fat stranding.  This is consistent with bowel obstruction and appears to be proximal and mid bowel loops involved with transition point in the right mid abdomen.  The ileum and colon are decompressed  -General surgery consult noted and greatly appreciated  -Continue IV fluids  -N.p.o. except for ice chips and sips with meds  -Continue NG tube  -Supportive care with antiemetics  -Correct electrolytes as needed.     #2 COPD  -Continue home regimen     #3 diabetes insulin-dependent  -Insulin sliding scale     #4 hypertension  -Should discontinue amlodipine because it is constipating.  Stevie with patient.  -We will continue losartan.Discussed plan with RN.    DVT prophylaxis:  Medical DVT prophylaxis orders are present.    CODE STATUS:   Level Of Support Discussed With: Patient  Code Status: CPR  Medical Interventions (Level of Support Prior to Arrest): Full        Electronically signed by Alexandr Hammer MD, 08/20/21, 9:34 AM EDT.

## 2021-08-20 NOTE — ED PROVIDER NOTES
"Time: 10:34 PM EDT  Arrived by: private car  Chief Complaint: Constipation  History provided by: Patient, pt's daughter  History is limited by: N/A     History of Present Illness:  Patient is a 86 y.o. year old female that presents to the emergency department with constipation for the past 1 day. She has not had a BM in 1 day. She has had abdominal pain, abdominal distension, nausea, and vomiting.    The patient had similar symptoms last year with \"twisted intestines.\" She has had a cholecystectomy and hysterectomy.     Onset of Symptoms: 1 day ago  Timing: constant Symptoms unchanged   Location: GI  Quality: Constipation  Severity: moderate  Associated signs/symptoms: Nausea, vomiting, abdominal pain, abdominal distension    Patient Care Team  Primary Care Provider: Arias Beck MD    Past Medical History:     Allergies   Allergen Reactions   • Sulfa Antibiotics Nausea And Vomiting     Past Medical History:   Diagnosis Date   • Arthritis    • Diabetes mellitus (CMS/HCC)    • Hypertension      Past Surgical History:   Procedure Laterality Date   • HYSTERECTOMY     • VEIN LIGATION AND STRIPPING       History reviewed. No pertinent family history.    Home Medications:  Prior to Admission medications    Not on File        Social History:   Social History     Tobacco Use   • Smoking status: Never Smoker   • Smokeless tobacco: Never Used   Substance Use Topics   • Alcohol use: Never   • Drug use: Never       Record Review:  I have reviewed the patient's records in [employer].     Review of Systems:  Review of Systems   Constitutional: Negative for chills and fever.   HENT: Negative for congestion, ear pain and sore throat.    Eyes: Negative for pain.   Respiratory: Negative for cough, chest tightness and shortness of breath.    Cardiovascular: Negative for chest pain.   Gastrointestinal: Positive for abdominal distention, abdominal pain, nausea and vomiting. Negative for diarrhea.   Genitourinary: Negative for flank " "pain and hematuria.   Musculoskeletal: Negative for joint swelling.   Skin: Negative for pallor.   Neurological: Negative for seizures and headaches.   All other systems reviewed and are negative.       Physical Exam:  /69   Pulse 90   Temp 99.2 °F (37.3 °C) (Oral)   Resp 19   Ht 160 cm (63\")   Wt 55.9 kg (123 lb 3.8 oz)   SpO2 100%   BMI 21.83 kg/m²     Physical Exam  Vitals and nursing note reviewed.   Constitutional:       General: She is not in acute distress.     Appearance: Normal appearance. She is not toxic-appearing.   HENT:      Head: Normocephalic and atraumatic.      Mouth/Throat:      Mouth: Mucous membranes are moist.   Eyes:      Extraocular Movements: Extraocular movements intact.      Pupils: Pupils are equal, round, and reactive to light.   Cardiovascular:      Rate and Rhythm: Normal rate and regular rhythm.      Pulses: Normal pulses.      Heart sounds: Normal heart sounds.   Pulmonary:      Effort: Pulmonary effort is normal. No respiratory distress.      Breath sounds: Normal breath sounds.   Abdominal:      General: Abdomen is flat.      Palpations: Abdomen is soft.      Tenderness: There is abdominal tenderness (mild diffuse).   Musculoskeletal:         General: Normal range of motion.      Cervical back: Normal range of motion and neck supple.   Skin:     General: Skin is warm and dry.   Neurological:      Mental Status: She is alert and oriented to person, place, and time. Mental status is at baseline.                Medications in the Emergency Department:  Medications   sodium chloride 0.9 % flush 10 mL (has no administration in time range)   sodium chloride 0.9 % bolus 1,000 mL (1,000 mL Intravenous New Bag 8/19/21 2359)   morphine injection 2 mg (2 mg Intravenous Given 8/19/21 2359)   ondansetron (ZOFRAN) injection 4 mg (4 mg Intravenous Given 8/20/21 0000)   iopamidol (ISOVUE-370) 76 % injection 100 mL (100 mL Intravenous Given 8/20/21 0021)        Labs  Lab Results (last " 24 hours)     Procedure Component Value Units Date/Time    CBC & Differential [[id number]]  (Abnormal) Collected: 08/19/21 2125    Specimen: Blood Updated: 08/19/21 2139    Narrative:      The following orders were created for panel order CBC & Differential.  Procedure                               Abnormality         Status                     ---------                               -----------         ------                     CBC Auto Differential[[id number]]        Abnormal            Final result                 Please view results for these tests on the individual orders.    Comprehensive Metabolic Panel [[id number]]  (Abnormal) Collected: 08/19/21 2125    Specimen: Blood Updated: 08/19/21 2158     Glucose 195 mg/dL      BUN 14 mg/dL      Creatinine 0.57 mg/dL      Sodium 134 mmol/L      Potassium 4.0 mmol/L      Chloride 98 mmol/L      CO2 22.6 mmol/L      Calcium 9.8 mg/dL      Total Protein 7.4 g/dL      Albumin 4.70 g/dL      ALT (SGPT) 10 U/L      AST (SGOT) 13 U/L      Alkaline Phosphatase 89 U/L      Total Bilirubin 0.6 mg/dL      eGFR  African Amer 122 mL/min/1.73      Globulin 2.7 gm/dL      A/G Ratio 1.7 g/dL      BUN/Creatinine Ratio 24.6     Anion Gap 13.4 mmol/L     Narrative:      GFR Normal >60  Chronic Kidney Disease <60  Kidney Failure <15      Lipase [[id number]]  (Normal) Collected: 08/19/21 2125    Specimen: Blood Updated: 08/19/21 2158     Lipase 25 U/L     CBC Auto Differential [[id number]]  (Abnormal) Collected: 08/19/21 2125    Specimen: Blood Updated: 08/19/21 2139     WBC 7.28 10*3/mm3      RBC 4.23 10*6/mm3      Hemoglobin 13.1 g/dL      Hematocrit 38.7 %      MCV 91.5 fL      MCH 31.0 pg      MCHC 33.9 g/dL      RDW 15.0 %      RDW-SD 50.1 fl      MPV 9.5 fL      Platelets 254 10*3/mm3      Neutrophil % 89.1 %      Lymphocyte % 5.9 %      Monocyte % 4.4 %      Eosinophil % 0.0 %      Basophil % 0.3 %      Immature Grans % 0.3 %      Neutrophils, Absolute 6.49 10*3/mm3      Lymphocytes,  Absolute 0.43 10*3/mm3      Monocytes, Absolute 0.32 10*3/mm3      Eosinophils, Absolute 0.00 10*3/mm3      Basophils, Absolute 0.02 10*3/mm3      Immature Grans, Absolute 0.02 10*3/mm3      nRBC 0.0 /100 WBC     POC Glucose Once [[id number]]  (Abnormal) Collected: 08/19/21 2346    Specimen: Blood Updated: 08/19/21 2347     Glucose 155 mg/dL      Comment: Serial Number: [id number]:  [id number]              Imaging:  CT Abdomen Pelvis With Contrast   Final Result          Procedures:  Procedures    Progress                            Medical Decision Making:  MDM  Number of Diagnoses or Management Options  Generalized abdominal pain  Small bowel obstruction (CMS/HCC)  Diagnosis management comments: Patient is afebrile nontoxic-appearing.  Vital signs stable.  At time of evaluation she is lying in bed in no acute distress.  Patient reports abdominal pain.  She reports history of previous bowel obstruction.  Labs showed no significant abnormality.  CT was obtained that showed small bowel obstruction likely due to adhesions.  Discussed patient with surgeon on-call who will see her in the hospital.  Discussed patient with hospitalist and she will be admitted for further care.       Amount and/or Complexity of Data Reviewed  Clinical lab tests: reviewed and ordered  Tests in the radiology section of CPT®: ordered and reviewed  Tests in the medicine section of CPT®: ordered and reviewed    Risk of Complications, Morbidity, and/or Mortality  Presenting problems: moderate  Diagnostic procedures: moderate  Management options: moderate    Patient Progress  Patient progress: stable       Final diagnoses:   Small bowel obstruction (CMS/HCC)   Generalized abdominal pain        Disposition:  ED Disposition     ED Disposition Condition Comment    Decision to Admit            Documentation assistance provided by Artur Hopkins acting as scribe for Antwon Sandoval MD. Information recorded by the scribe was done at my  direction and has been verified and validated by me.      Artur Hopkins  08/19/21 1817       Antwon Sandoval MD  08/20/21 3708

## 2021-08-20 NOTE — H&P
[facility] HISTORY AND PHYSICAL  Date: 2021   Patient Name: Annita Rodriguez  : 1935  MRN: [id number]  Primary Care Physician:  Arias Beck MD  Date of admission: 2021    Subjective Constipation  Subjective     Chief Complaint: Constipation    HPI: Patient is an 86-year-old female that presents with constipation for 1 day.  She has had abdominal pain, distention, nausea, and vomiting. Her CT scan shows: Multiple dilated loops of fluid-filled small bowel with surrounding fat stranding.  This is consistent with bowel obstruction and appears to be proximal and mid bowel loops involved with transition point in the right mid abdomen.  The ileum and colon are decompressed.  Dr. Oden  was consulted they will follow the patient.  If possible, daughter would like to avoid surgery.  There is concern for adhesions.    She was in the hospital in January for similar situation.  At that time, surgery was consulted and they recommended placing a NG tube.    Patient's past medical history is significant for diabetes type 2, COPD/asthma, and history of tobacco abuse.   Personal History     Past Medical History:  Past Medical History:   Diagnosis Date   • Arthritis    • Diabetes mellitus (CMS/HCC)    • Hypertension          Past Surgical History:  Past Surgical History:   Procedure Laterality Date   • HYSTERECTOMY     • OOPHORECTOMY Bilateral    • VEIN LIGATION AND STRIPPING         Family History:   Noncontributory      Social History:   Smoked 1/3 ppd for about 15 years and quit about a year ago      she does also have exposure to second hand smoke     Home Medications:  arformoterol, budesonide, folic acid, hydroxychloroquine, metFORMIN, methotrexate, and tiotropium bromide monohydrate    Allergies:  Allergies   Allergen Reactions   • Sulfa Antibiotics Nausea And Vomiting       Review of Systems   All systems were reviewed and negative except for: Constipation and abdominal  pain    Objective   Objective     Vitals:   Temp:  [99.2 °F (37.3 °C)] 99.2 °F (37.3 °C)  Heart Rate:  [90] 90  Resp:  [19] 19  BP: (152)/(69) 152/69    Physical Exam    Constitutional: Awake, alert, no acute distress   Eyes: Pupils equal, sclerae anicteric, no conjunctival injection   HENT: NCAT, mucous membranes moist   Neck: Supple, no thyromegaly, no lymphadenopathy, trachea midline   Respiratory: Clear to auscultation bilaterally, nonlabored respirations    Cardiovascular: RRR, no murmurs, rubs, or gallops, palpable pedal pulses bilaterally   Gastrointestinal: Positive bowel sounds, soft, nontender, nondistended   Musculoskeletal: No bilateral ankle edema, no clubbing or cyanosis to extremities   Psychiatric: Appropriate affect, cooperative   Neurologic: Oriented x 3, strength symmetric in all extremities, Cranial Nerves grossly intact to confrontation, speech clear   Skin: No rashes     Result Review    Result Review:  I have personally reviewed the results from the time of this admission to 8/20/2021 02:34 EDT and agree with these findings:  [x]  Laboratory  []  Microbiology  [x]  Radiology  [x]  EKG/Telemetry   []  Cardiology/Vascular   []  Pathology  []  Old records  []  Other:      Assessment/Plan   Assessment / Plan   Assessment/Plan:   #1 small bowel obstruction secondary to adhesions most likely  - CT today: Multiple dilated loops of fluid-filled small bowel with surrounding fat stranding.  This is consistent with bowel obstruction and appears to be proximal and mid bowel loops involved with transition point in the right mid abdomen.  The ileum and colon are decompressed  -Dr. Oden consulted  -IV fluids  -N.p.o. except for ice chips and sips with meds  -Plan for NG tube insertion on 8/20/2021.  -Supportive care with antiemetics  -Correct electrolytes    #2 COPD  -Continue home regimen    #3 diabetes insulin-dependent  -Insulin sliding scale    #4 hypertension  -Should discontinue amlodipine because it  is constipating.  -We will continue losartan.    DVT prophylaxis:  Medical DVT prophylaxis orders are present.    CODE STATUS:         Admission Status:  I believe this patient meets inpatient status.    Electronically signed by Doron Ken DO, 08/20/21, 2:34 AM EDT.

## 2021-08-20 NOTE — CASE MANAGEMENT/SOCIAL WORK
Discharge Planning Assessment   [facility]     Patient Name: Annita Rodriguez  MRN: [id number]  Today's Date: 8/20/2021    Admit Date: 8/19/2021    Discharge Needs Assessment     Row Name 08/20/21 1203       Living Environment    Lives With  child(maricruz), adult    Name(s) of Who Lives With Patient  Chela daughter    Current Living Arrangements  home/apartment/condo    Primary Care Provided by  self    Family Caregiver if Needed  child(maricruz), adult    Quality of Family Relationships  helpful;involved;supportive    Able to Return to Prior Arrangements  yes       Resource/Environmental Concerns    Resource/Environmental Concerns  none       Transition Planning    Patient/Family Anticipates Transition to  home with family    Patient/Family Anticipated Services at Transition  none    Transportation Anticipated  family or friend will provide       Discharge Needs Assessment    Readmission Within the Last 30 Days  no previous admission in last 30 days    Equipment Currently Used at Home  nebulizer    Concerns to be Addressed  no discharge needs identified    Anticipated Changes Related to Illness  none    Equipment Needed After Discharge  none        Discharge Plan     Row Name 08/20/21 1203       Plan    Plan  Pt lives at home with her daughter Chela. Pt states she is still independent and able to care fore herself. Pt daughters provide transportation. Pt denies any dc needs at this time.    Patient/Family in Agreement with Plan  yes        Continued Care and Services - Admitted Since 8/19/2021    Coordination has not been started for this encounter.         Demographic Summary     Row Name 08/20/21 1203       General Information    Admission Type  inpatient    Arrived From  emergency department    Reason for Consult  discharge planning    Preferred Language  English     Used During This Interaction  no        Functional Status     Row Name 08/20/21 1207       Functional Status    Usual Activity Tolerance   excellent    Current Activity Tolerance  excellent       Functional Status, IADL    Medications  independent    Meal Preparation  independent    Housekeeping  assistive person    Laundry  assistive person    Shopping  assistive person       Mental Status Summary    Recent Changes in Mental Status/Cognitive Functioning  no changes       Employment/    Employment Status  retired        Psychosocial    No documentation.       Abuse/Neglect    No documentation.       Legal    No documentation.       Substance Abuse    No documentation.       Patient Forms    No documentation.           Christiana Bryan RN

## 2021-08-21 ENCOUNTER — APPOINTMENT (OUTPATIENT)
Dept: GENERAL RADIOLOGY | Facility: HOSPITAL | Age: 86
End: 2021-08-21

## 2021-08-21 LAB
ANION GAP SERPL CALCULATED.3IONS-SCNC: 11.2 MMOL/L (ref 5–15)
BASOPHILS # BLD AUTO: 0.01 10*3/MM3 (ref 0–0.2)
BASOPHILS NFR BLD AUTO: 0.2 % (ref 0–1.5)
BUN SERPL-MCNC: 8 MG/DL (ref 8–23)
BUN/CREAT SERPL: 12.5 (ref 7–25)
CALCIUM SPEC-SCNC: 9 MG/DL (ref 8.6–10.5)
CHLORIDE SERPL-SCNC: 102 MMOL/L (ref 98–107)
CO2 SERPL-SCNC: 27.8 MMOL/L (ref 22–29)
CREAT SERPL-MCNC: 0.64 MG/DL (ref 0.57–1)
DEPRECATED RDW RBC AUTO: 53.9 FL (ref 37–54)
EOSINOPHIL # BLD AUTO: 0.22 10*3/MM3 (ref 0–0.4)
EOSINOPHIL NFR BLD AUTO: 3.8 % (ref 0.3–6.2)
ERYTHROCYTE [DISTWIDTH] IN BLOOD BY AUTOMATED COUNT: 15.4 % (ref 12.3–15.4)
GFR SERPL CREATININE-BSD FRML MDRD: 107 ML/MIN/1.73
GLUCOSE BLDC GLUCOMTR-MCNC: 136 MG/DL (ref 70–99)
GLUCOSE BLDC GLUCOMTR-MCNC: 143 MG/DL (ref 70–99)
GLUCOSE BLDC GLUCOMTR-MCNC: 149 MG/DL (ref 70–99)
GLUCOSE BLDC GLUCOMTR-MCNC: 156 MG/DL (ref 70–99)
GLUCOSE BLDC GLUCOMTR-MCNC: 161 MG/DL (ref 70–99)
GLUCOSE SERPL-MCNC: 173 MG/DL (ref 65–99)
HCT VFR BLD AUTO: 41.3 % (ref 34–46.6)
HGB BLD-MCNC: 13.3 G/DL (ref 12–15.9)
IMM GRANULOCYTES # BLD AUTO: 0.01 10*3/MM3 (ref 0–0.05)
IMM GRANULOCYTES NFR BLD AUTO: 0.2 % (ref 0–0.5)
LYMPHOCYTES # BLD AUTO: 0.52 10*3/MM3 (ref 0.7–3.1)
LYMPHOCYTES NFR BLD AUTO: 9.1 % (ref 19.6–45.3)
MAGNESIUM SERPL-MCNC: 1.9 MG/DL (ref 1.6–2.4)
MCH RBC QN AUTO: 30.6 PG (ref 26.6–33)
MCHC RBC AUTO-ENTMCNC: 32.2 G/DL (ref 31.5–35.7)
MCV RBC AUTO: 94.9 FL (ref 79–97)
MONOCYTES # BLD AUTO: 0.55 10*3/MM3 (ref 0.1–0.9)
MONOCYTES NFR BLD AUTO: 9.6 % (ref 5–12)
NEUTROPHILS NFR BLD AUTO: 4.41 10*3/MM3 (ref 1.7–7)
NEUTROPHILS NFR BLD AUTO: 77.1 % (ref 42.7–76)
NRBC BLD AUTO-RTO: 0 /100 WBC (ref 0–0.2)
PHOSPHATE SERPL-MCNC: 3 MG/DL (ref 2.5–4.5)
PLATELET # BLD AUTO: 261 10*3/MM3 (ref 140–450)
PMV BLD AUTO: 9.8 FL (ref 6–12)
POTASSIUM SERPL-SCNC: 3.1 MMOL/L (ref 3.5–5.2)
QT INTERVAL: 339 MS
RBC # BLD AUTO: 4.35 10*6/MM3 (ref 3.77–5.28)
SODIUM SERPL-SCNC: 141 MMOL/L (ref 136–145)
TROPONIN T SERPL-MCNC: <0.01 NG/ML (ref 0–0.03)
WBC # BLD AUTO: 5.72 10*3/MM3 (ref 3.4–10.8)

## 2021-08-21 PROCEDURE — 84484 ASSAY OF TROPONIN QUANT: CPT | Performed by: PHYSICIAN ASSISTANT

## 2021-08-21 PROCEDURE — 36415 COLL VENOUS BLD VENIPUNCTURE: CPT | Performed by: PHYSICIAN ASSISTANT

## 2021-08-21 PROCEDURE — 84100 ASSAY OF PHOSPHORUS: CPT | Performed by: HOSPITALIST

## 2021-08-21 PROCEDURE — 80048 BASIC METABOLIC PNL TOTAL CA: CPT | Performed by: PHYSICIAN ASSISTANT

## 2021-08-21 PROCEDURE — 99232 SBSQ HOSP IP/OBS MODERATE 35: CPT | Performed by: SURGERY

## 2021-08-21 PROCEDURE — 83735 ASSAY OF MAGNESIUM: CPT | Performed by: PHYSICIAN ASSISTANT

## 2021-08-21 PROCEDURE — 25010000002 ENOXAPARIN PER 10 MG: Performed by: HOSPITALIST

## 2021-08-21 PROCEDURE — 85025 COMPLETE CBC W/AUTO DIFF WBC: CPT | Performed by: HOSPITALIST

## 2021-08-21 PROCEDURE — 93010 ELECTROCARDIOGRAM REPORT: CPT | Performed by: INTERNAL MEDICINE

## 2021-08-21 PROCEDURE — 93005 ELECTROCARDIOGRAM TRACING: CPT | Performed by: INTERNAL MEDICINE

## 2021-08-21 PROCEDURE — 74019 RADEX ABDOMEN 2 VIEWS: CPT

## 2021-08-21 PROCEDURE — 82962 GLUCOSE BLOOD TEST: CPT

## 2021-08-21 PROCEDURE — 94799 UNLISTED PULMONARY SVC/PX: CPT

## 2021-08-21 PROCEDURE — 99232 SBSQ HOSP IP/OBS MODERATE 35: CPT | Performed by: INTERNAL MEDICINE

## 2021-08-21 RX ADMIN — ACETAMINOPHEN 650 MG: 325 TABLET ORAL at 20:20

## 2021-08-21 RX ADMIN — SODIUM CHLORIDE, PRESERVATIVE FREE 10 ML: 5 INJECTION INTRAVENOUS at 22:20

## 2021-08-21 RX ADMIN — ENOXAPARIN SODIUM 40 MG: 40 INJECTION SUBCUTANEOUS at 09:18

## 2021-08-21 RX ADMIN — ARFORMOTEROL TARTRATE 15 MCG: 15 SOLUTION RESPIRATORY (INHALATION) at 06:49

## 2021-08-21 RX ADMIN — MAGNESIUM HYDROXIDE 10 ML: 2400 SUSPENSION ORAL at 14:57

## 2021-08-21 RX ADMIN — BUDESONIDE 0.5 MG: 0.5 INHALANT ORAL at 22:07

## 2021-08-21 RX ADMIN — DEXTROSE AND SODIUM CHLORIDE 100 ML/HR: 5; 900 INJECTION, SOLUTION INTRAVENOUS at 03:59

## 2021-08-21 RX ADMIN — BUDESONIDE 0.5 MG: 0.5 INHALANT ORAL at 06:49

## 2021-08-21 RX ADMIN — LOSARTAN POTASSIUM 50 MG: 50 TABLET, FILM COATED ORAL at 09:18

## 2021-08-21 RX ADMIN — ARFORMOTEROL TARTRATE 15 MCG: 15 SOLUTION RESPIRATORY (INHALATION) at 22:07

## 2021-08-21 RX ADMIN — METOPROLOL TARTRATE 12.5 MG: 25 TABLET, FILM COATED ORAL at 22:15

## 2021-08-21 RX ADMIN — METOPROLOL TARTRATE 5 MG: 1 INJECTION, SOLUTION INTRAVENOUS at 03:52

## 2021-08-21 RX ADMIN — TIOTROPIUM BROMIDE INHALATION SPRAY 2 PUFF: 3.12 SPRAY, METERED RESPIRATORY (INHALATION) at 06:50

## 2021-08-21 NOTE — PLAN OF CARE
Goal Outcome Evaluation:  Plan of Care Reviewed With: patient, daughter        Progress: improving  Outcome Summary: PT HAD BM TONIGHT, AFTERWARDS SHE WENT INTO AFIB 150-160s, 5MG LOPRESSOR WAS GIVEN, PT NSR 70s. PT WAS ASYMTOMATIC FOR THE EPISODE.Ananya Veloz RN

## 2021-08-21 NOTE — PROGRESS NOTES
[facility]     Progress Note    Patient Name: Annita Rodriguez  : 1935  MRN: [id number]  Date of admission: 2021    Subjective   Had several BMs last night.  axr shows air in both sb and lb.  No n/v.  Having some issues w/ afib that are being addressed by hospitalist service    Objective       Current Diet:    Dietary Orders (From admission, onward)     Start     Ordered    21 1110  Diet Full Liquid  Diet Effective Now     Question:  Diet Texture / Consistency  Answer:  Full Liquid    21 1109                Vitals:   Temp:  [98.3 °F (36.8 °C)-98.8 °F (37.1 °C)] 98.6 °F (37 °C)  Heart Rate:  [] 83  Resp:  [14-18] 18  BP: (115-142)/(51-73) 115/51  Physical Exam   Daughter present in room  • Constitutional: alert, no acute distress  • Eyes:  sclerae clear, conjunctivae clear, EOMI  • Neck:  normal appearance, no masses, trachea midline  • Respiratory:  breathing not labored, respiratory effort appears normal  • Abdomen:  Soft, mildly distended, mildly tender to deep palpationlear  • Psychiatric:  judgment and insight intact, mood normal, affect appropriate, cooperative    LABS:  Lab Results (last 24 hours)     Procedure Component Value Units Date/Time    POC Glucose Once [[id number]]  (Abnormal) Collected: 21    Specimen: Blood Updated: 21     Glucose 161 mg/dL      Comment: Serial Number: [id number]:  [id number]       Basic Metabolic Panel [[id number]]  (Abnormal) Collected: 21    Specimen: Blood Updated: 21 050     Glucose 173 mg/dL      BUN 8 mg/dL      Creatinine 0.64 mg/dL      Sodium 141 mmol/L      Potassium 3.1 mmol/L      Chloride 102 mmol/L      CO2 27.8 mmol/L      Calcium 9.0 mg/dL      eGFR  African Amer 107 mL/min/1.73      BUN/Creatinine Ratio 12.5     Anion Gap 11.2 mmol/L     Narrative:      GFR Normal >60  Chronic Kidney Disease <60  Kidney Failure <15      Phosphorus [[id number]]  (Normal) Collected: 21     Specimen: Blood Updated: 08/21/21 0502     Phosphorus 3.0 mg/dL     Troponin [[id number]]  (Normal) Collected: 08/21/21 0404    Specimen: Blood Updated: 08/21/21 0502     Troponin T <0.010 ng/mL     Narrative:      Troponin T Reference Range:  <= 0.03 ng/mL-   Negative for AMI  >0.03 ng/mL-     Abnormal for myocardial necrosis.  Clinicians would have to utilize clinical acumen, EKG, Troponin and serial changes to determine if it is an Acute Myocardial Infarction or myocardial injury due to an underlying chronic condition.       Results may be falsely decreased if patient taking Biotin.      Magnesium [[id number]]  (Normal) Collected: 08/21/21 0404    Specimen: Blood Updated: 08/21/21 0502     Magnesium 1.9 mg/dL     CBC & Differential [[id number]]  (Abnormal) Collected: 08/21/21 0404    Specimen: Blood Updated: 08/21/21 0441    Narrative:      The following orders were created for panel order CBC & Differential.  Procedure                               Abnormality         Status                     ---------                               -----------         ------                     CBC Auto Differential[[id number]]        Abnormal            Final result                 Please view results for these tests on the individual orders.    CBC Auto Differential [[id number]]  (Abnormal) Collected: 08/21/21 0404    Specimen: Blood Updated: 08/21/21 0441     WBC 5.72 10*3/mm3      RBC 4.35 10*6/mm3      Hemoglobin 13.3 g/dL      Hematocrit 41.3 %      MCV 94.9 fL      MCH 30.6 pg      MCHC 32.2 g/dL      RDW 15.4 %      RDW-SD 53.9 fl      MPV 9.8 fL      Platelets 261 10*3/mm3      Neutrophil % 77.1 %      Lymphocyte % 9.1 %      Monocyte % 9.6 %      Eosinophil % 3.8 %      Basophil % 0.2 %      Immature Grans % 0.2 %      Neutrophils, Absolute 4.41 10*3/mm3      Lymphocytes, Absolute 0.52 10*3/mm3      Monocytes, Absolute 0.55 10*3/mm3      Eosinophils, Absolute 0.22 10*3/mm3      Basophils, Absolute 0.01 10*3/mm3       Immature Grans, Absolute 0.01 10*3/mm3      nRBC 0.0 /100 WBC     POC Glucose Once [[id number]]  (Abnormal) Collected: 08/21/21 0308    Specimen: Blood Updated: 08/21/21 0309     Glucose 149 mg/dL      Comment: Serial Number: [id number]:  [id number]       POC Glucose Once [[id number]]  (Normal) Collected: 08/20/21 1717    Specimen: Blood Updated: 08/20/21 1720     Glucose 89 mg/dL      Comment: Serial Number: [id number]:  [id number]             IMAGING:  Imaging Results (Last 24 Hours)     Procedure Component Value Units Date/Time    XR Abdomen Flat & Upright [[id number]] Collected: 08/21/21 0645     Updated: 08/21/21 0650    Narrative:      PROCEDURE: XR ABDOMEN FLAT AND UPRIGHT     COMPARISON: [facility], CR, ABDOMEN FLAT & UPRIGHT, 2/06/2020, 6:37.     INDICATIONS: ABDOMINAL PAIN, BOWEL OBSTRUCTION.     FINDINGS:   There is a nasogastric tube in the stomach.  There are scattered pockets of large and small bowel   gas in a nonspecific but nonobstructed pattern.  There is no evidence of free air under the   diaphragm on the upright film.  Is the lung bases are clear.  There are degenerative changes of the   spine.  There are clips from cholecystectomy.     CONCLUSION: NG tube is in the stomach.  Nonspecific but nonobstructed appearing gas pattern.            JAI OVALLE MD         Electronically Signed and Approved By: JAI OVALLE MD on 8/21/2021 at 6:45                           []  Laboratory  []  Microbiology  [x]  Radiology  []  EKG/Telemetry   []  Cardiology/Vascular   []  Pathology  []  Old records    Assessment / Plan   Assessment:   SBO - appears to be resolving    Plan:    Clamp NG tube  Full liq diet     Electronically signed by Kashif Oden MD, 08/21/21, 12:39 PM EDT.

## 2021-08-21 NOTE — PLAN OF CARE
Goal Outcome Evaluation:      Ng tube is clamped tolerating clear liquid diet well. Pt is ambulating in room ad taylor. No C/O pain. Sinus rhythm on the tele monitor, cardiology consulted. VSS. Matilda Houser RN

## 2021-08-21 NOTE — PROGRESS NOTES
[facility]   Hospitalist Progress Note  Date: 2021  Patient Name: Annita Rodriguez  : 1935  MRN: [id number]  Date of admission: 2021      Subjective   Subjective     Chief Complaint: Constipation    Summary: Patient is an 86-year-old female that presents with constipation for 1 day.  She has had abdominal pain, distention, nausea, and vomiting. Her CT scan shows: Multiple dilated loops of fluid-filled small bowel with surrounding fat stranding.  This is consistent with bowel obstruction and appears to be proximal and mid bowel loops involved with transition point in the right mid abdomen.  The ileum and colon are decompressed.  Dr. Oden  was consulted they will follow the patient.  If possible, daughter would like to avoid surgery.  There is concern for adhesions.     She was in the hospital in January for similar situation.  At that time, surgery was consulted and they recommended placing a NG tube.     Patient's past medical history is significant for diabetes type 2, COPD/asthma, and history of tobacco abuse.           Interval Followup: Patient reports feeling better this morning.  She reports passing  flatus and having 2 bowel movements.  She denies abdominal pain nausea or vomiting.  Overnight the patient did have an episode of A. fib with RVR with heart rates into the 150s.  Patient states that she has never had anything similar to this in the past and does not have a diagnosis of atrial fibrillation.  She reports that she did notice that her heart was beating fast in her chest when it occurred.  She denied any associated chest pain.  She did receive a dose of IV metoprolol and heart rates improved and subsequently the patient returned to sinus rhythm.    Review of Systems   All systems were reviewed and negative except for: As noted in HPI    Objective   Objective     Vitals:   Temp:  [98.1 °F (36.7 °C)-98.8 °F (37.1 °C)] 98.8 °F (37.1 °C)  Heart Rate:  [] 79  Resp:  [14-18]  18  BP: (118-142)/(56-73) 118/58  Physical Exam                         Constitutional: Awake, alert, no acute distress              Eyes: Pupils equal, sclerae anicteric, no conjunctival injection              HENT: NCAT, mucous membranes moist.  NG tube in place              Neck: Supple, no thyromegaly, no lymphadenopathy, trachea midline              Respiratory: Clear to auscultation bilaterally, nonlabored respirations               Cardiovascular: RRR, no murmurs, rubs, or gallops, palpable pedal pulses bilaterally              Gastrointestinal:  soft, nontender, nondistended, positive bowel sounds              Musculoskeletal: No bilateral ankle edema, no clubbing or cyanosis to extremities              Psychiatric: Appropriate affect, cooperative              Neurologic: Oriented x 3, moves all extremities no focal weakness appreciated, Cranial Nerves grossly intact, speech clear              Skin: No rashes         Result Review    Result Review:  I have personally reviewed the results from the time of this admission to 8/21/2021 09:37 EDT and agree with these findings:  [x]  Laboratory  [x]  Microbiology  [x]  Radiology  []  EKG/Telemetry   []  Cardiology/Vascular   []  Pathology  []  Old records  [x]  Other: Medications  Assessment/Plan   Assessment / Plan   Assessment/Plan:   #1 small bowel obstruction secondary to adhesions most likely  - CT today: Multiple dilated loops of fluid-filled small bowel with surrounding fat stranding.  This is consistent with bowel obstruction and appears to be proximal and mid bowel loops involved with transition point in the right mid abdomen.  The ileum and colon are decompressed  -Abdominal x-ray on this morning shows gas in the small and large bowel.  -General surgery consult noted and greatly appreciated.  Plans for insulin of diet noted.  -Continue IV fluids although will decrease rate from 100 to 50 cc/h.  -NG tube management per general surgery  -Supportive care  with antiemetics  -Correct electrolytes as needed.     Paroxysmal atrial fibrillation with RVR  -We will add metoprolol 12.5 mg p.o. twice daily.  -Consult cardiology for further assistance in management.  -We will obtain 2D echo for further evaluation.    #2 COPD (no evidence of exacerbation at this time)  -Continue home regimen     #3 diabetes insulin-dependent  -Continue sliding scale insulin     #4 hypertension  -Amlodipine will be discontinued.  This was discussed with the patient and her daughter at bedside.  -We will continue losartan.    Discussed plan with RN.    DVT prophylaxis:  Medical DVT prophylaxis orders are present.    CODE STATUS:   Level Of Support Discussed With: Patient  Code Status: CPR  Medical Interventions (Level of Support Prior to Arrest): Full        Electronically signed by Alexandr Hammer MD, 08/20/21, 9:34 AM EDT.

## 2021-08-22 PROBLEM — I10 ESSENTIAL HYPERTENSION: Chronic | Status: ACTIVE | Noted: 2021-08-22

## 2021-08-22 PROBLEM — I48.92 ATRIAL FLUTTER: Status: ACTIVE | Noted: 2021-08-22

## 2021-08-22 PROBLEM — E87.6 HYPOKALEMIA: Status: ACTIVE | Noted: 2021-08-22

## 2021-08-22 LAB
ANION GAP SERPL CALCULATED.3IONS-SCNC: 6.9 MMOL/L (ref 5–15)
BUN SERPL-MCNC: 6 MG/DL (ref 8–23)
BUN/CREAT SERPL: 10.7 (ref 7–25)
CALCIUM SPEC-SCNC: 8.4 MG/DL (ref 8.6–10.5)
CHLORIDE SERPL-SCNC: 107 MMOL/L (ref 98–107)
CO2 SERPL-SCNC: 26.1 MMOL/L (ref 22–29)
CREAT SERPL-MCNC: 0.56 MG/DL (ref 0.57–1)
DEPRECATED RDW RBC AUTO: 51.8 FL (ref 37–54)
ERYTHROCYTE [DISTWIDTH] IN BLOOD BY AUTOMATED COUNT: 15 % (ref 12.3–15.4)
GFR SERPL CREATININE-BSD FRML MDRD: 125 ML/MIN/1.73
GLUCOSE BLDC GLUCOMTR-MCNC: 110 MG/DL (ref 70–99)
GLUCOSE BLDC GLUCOMTR-MCNC: 120 MG/DL (ref 70–99)
GLUCOSE BLDC GLUCOMTR-MCNC: 140 MG/DL (ref 70–99)
GLUCOSE SERPL-MCNC: 141 MG/DL (ref 65–99)
HCT VFR BLD AUTO: 32.7 % (ref 34–46.6)
HGB BLD-MCNC: 10.6 G/DL (ref 12–15.9)
MAGNESIUM SERPL-MCNC: 2.2 MG/DL (ref 1.6–2.4)
MCH RBC QN AUTO: 30.9 PG (ref 26.6–33)
MCHC RBC AUTO-ENTMCNC: 32.4 G/DL (ref 31.5–35.7)
MCV RBC AUTO: 95.3 FL (ref 79–97)
PLATELET # BLD AUTO: 205 10*3/MM3 (ref 140–450)
PMV BLD AUTO: 9.9 FL (ref 6–12)
POTASSIUM SERPL-SCNC: 3.1 MMOL/L (ref 3.5–5.2)
RBC # BLD AUTO: 3.43 10*6/MM3 (ref 3.77–5.28)
SODIUM SERPL-SCNC: 140 MMOL/L (ref 136–145)
WBC # BLD AUTO: 6.49 10*3/MM3 (ref 3.4–10.8)

## 2021-08-22 PROCEDURE — 82962 GLUCOSE BLOOD TEST: CPT

## 2021-08-22 PROCEDURE — 94799 UNLISTED PULMONARY SVC/PX: CPT

## 2021-08-22 PROCEDURE — 85027 COMPLETE CBC AUTOMATED: CPT | Performed by: INTERNAL MEDICINE

## 2021-08-22 PROCEDURE — 25010000002 ENOXAPARIN PER 10 MG: Performed by: HOSPITALIST

## 2021-08-22 PROCEDURE — 83735 ASSAY OF MAGNESIUM: CPT | Performed by: INTERNAL MEDICINE

## 2021-08-22 PROCEDURE — 99232 SBSQ HOSP IP/OBS MODERATE 35: CPT | Performed by: INTERNAL MEDICINE

## 2021-08-22 PROCEDURE — 80048 BASIC METABOLIC PNL TOTAL CA: CPT | Performed by: INTERNAL MEDICINE

## 2021-08-22 PROCEDURE — 99222 1ST HOSP IP/OBS MODERATE 55: CPT | Performed by: INTERNAL MEDICINE

## 2021-08-22 PROCEDURE — 99231 SBSQ HOSP IP/OBS SF/LOW 25: CPT | Performed by: SURGERY

## 2021-08-22 RX ORDER — POTASSIUM CHLORIDE 750 MG/1
40 CAPSULE, EXTENDED RELEASE ORAL 2 TIMES DAILY WITH MEALS
Status: COMPLETED | OUTPATIENT
Start: 2021-08-22 | End: 2021-08-23

## 2021-08-22 RX ADMIN — LOSARTAN POTASSIUM 50 MG: 50 TABLET, FILM COATED ORAL at 08:20

## 2021-08-22 RX ADMIN — METOPROLOL TARTRATE 12.5 MG: 25 TABLET, FILM COATED ORAL at 21:07

## 2021-08-22 RX ADMIN — ENOXAPARIN SODIUM 40 MG: 40 INJECTION SUBCUTANEOUS at 08:22

## 2021-08-22 RX ADMIN — DEXTROSE AND SODIUM CHLORIDE 50 ML/HR: 5; 900 INJECTION, SOLUTION INTRAVENOUS at 08:20

## 2021-08-22 RX ADMIN — POTASSIUM CHLORIDE 40 MEQ: 10 CAPSULE, COATED, EXTENDED RELEASE ORAL at 17:55

## 2021-08-22 RX ADMIN — ARFORMOTEROL TARTRATE 15 MCG: 15 SOLUTION RESPIRATORY (INHALATION) at 20:46

## 2021-08-22 RX ADMIN — TIOTROPIUM BROMIDE INHALATION SPRAY 2 PUFF: 3.12 SPRAY, METERED RESPIRATORY (INHALATION) at 11:51

## 2021-08-22 RX ADMIN — BUDESONIDE 0.5 MG: 0.5 INHALANT ORAL at 11:50

## 2021-08-22 RX ADMIN — METOPROLOL TARTRATE 12.5 MG: 25 TABLET, FILM COATED ORAL at 08:20

## 2021-08-22 RX ADMIN — ACETAMINOPHEN 650 MG: 325 TABLET ORAL at 14:34

## 2021-08-22 RX ADMIN — BUDESONIDE 0.5 MG: 0.5 INHALANT ORAL at 20:46

## 2021-08-22 RX ADMIN — ARFORMOTEROL TARTRATE 15 MCG: 15 SOLUTION RESPIRATORY (INHALATION) at 11:50

## 2021-08-22 RX ADMIN — POTASSIUM CHLORIDE 40 MEQ: 10 CAPSULE, COATED, EXTENDED RELEASE ORAL at 12:41

## 2021-08-22 NOTE — PROGRESS NOTES
[facility]     Progress Note    Patient Name: Annita Rodriguez  : 1935  MRN: [id number]  Primary Care Physician:  Arias Beck MD  Date of admission: 2021    Subjective   Subjective     Chief Complaint: Follow-up of small bowel obstruction, atrial flutter, hypokalemia, hypertension    HPI:  Annita Rodriguez is a 86 y.o. female reports feeling fairly well.  She has tolerated clamping of her NG tube, she has tolerated a full liquid diet.  She reported passing flatus and 3 bowel movements in the last 24 hours.  She is not having abdominal pain.  She denies any upset stomach, nausea or vomiting.  No fevers.  She is overall feeling well.  Nursing is not reporting any new concerns either.  We have been replacing her hypokalemia with oral potassium x3 doses.    Review of Systems:  She denies any uncontrolled pain, she is not reporting any myalgias or arthralgias.  She denies respiratory difficulty like cough or congestion.  No palpitations, no chest pain or pressure.  She has not reported any symptoms suggestive of recurrent a flutter.  She denies any dysuria.  Overall, she is feeling well.    Objective   Objective     Physical Exam:  Vitals:   Temp:  [98.4 °F (36.9 °C)-99.3 °F (37.4 °C)] 99 °F (37.2 °C)  Heart Rate:  [60-86] 73  Resp:  [16-20] 18  BP: (118-139)/(53-67) 139/61       21  0217   Weight: 54.7 kg (120 lb 9.5 oz)        She was awake, alert, seated comfortably in bed.  NG tube is clamped.  Her heart has a regular rate and rhythm without ectopy.  Her lung fields are clear posteriorly without wheezes, rales, rhonchi.  Her abdomen is soft, nondistended, nontender.  No suprapubic or periumbilical tenderness.  She has no gross deformities of her arms or legs.  She has no peripheral edema.  Calves are soft.  She has symmetric radial and posterior tibial pulses.  NG tube is present.  Is currently clamped.    Result Review    Result Review:  I have personally reviewed these results  and agree with these findings.  They include:    LAB RESULTS FROM LAST 24 HOURS:  Lab Results (last 24 hours)     Procedure Component Value Units Date/Time    POC Glucose Once [[id number]]  (Abnormal) Collected: 08/22/21 1733    Specimen: Blood Updated: 08/22/21 1733     Glucose 140 mg/dL      Comment: Serial Number: [id number]:  [id number]       POC Glucose Once [[id number]]  (Abnormal) Collected: 08/22/21 1240    Specimen: Blood Updated: 08/22/21 1241     Glucose 120 mg/dL      Comment: Serial Number: [id number]:  [id number]       POC Glucose Once [[id number]]  (Abnormal) Collected: 08/22/21 0825    Specimen: Blood Updated: 08/22/21 0830     Glucose 110 mg/dL      Comment: Serial Number: [id number]:  [id number]       Basic Metabolic Panel [[id number]]  (Abnormal) Collected: 08/22/21 0408    Specimen: Blood Updated: 08/22/21 0457     Glucose 141 mg/dL      BUN 6 mg/dL      Creatinine 0.56 mg/dL      Sodium 140 mmol/L      Potassium 3.1 mmol/L      Chloride 107 mmol/L      CO2 26.1 mmol/L      Calcium 8.4 mg/dL      eGFR  African Amer 125 mL/min/1.73      BUN/Creatinine Ratio 10.7     Anion Gap 6.9 mmol/L     Narrative:      GFR Normal >60  Chronic Kidney Disease <60  Kidney Failure <15      Magnesium [[id number]]  (Normal) Collected: 08/22/21 0408    Specimen: Blood Updated: 08/22/21 0457     Magnesium 2.2 mg/dL     CBC (No Diff) [[id number]]  (Abnormal) Collected: 08/22/21 0408    Specimen: Blood Updated: 08/22/21 0447     WBC 6.49 10*3/mm3      RBC 3.43 10*6/mm3      Hemoglobin 10.6 g/dL      Hematocrit 32.7 %      MCV 95.3 fL      MCH 30.9 pg      MCHC 32.4 g/dL      RDW 15.0 %      RDW-SD 51.8 fl      MPV 9.9 fL      Platelets 205 10*3/mm3           MICROBIOLOGY FROM LAST 7 DAYS:  No results found for: ACANTHNAEG, AFBCX, BPERTUSSISCX, BLOODCX  No results found for: BCIDPCR, CXREFLEX, CSFCX, CULTURETIS  No results found for: CULTURES, HSVCX, URCX  No results found for: EYECULTURE, GCCX, HSVCULTURE,  LABHSV  No results found for: LEGIONELLA, MRSACX, MUMPSCX, MYCOPLASCX  No results found for: NOCARDIACX, STOOLCX  No results found for: THROATCX, UNSTIMCULT, URINECX, CULTURE, VZVCULTUR  No results found for: VIRALCULTU, WOUNDCX      RADIOLOGY FROM LAST 24 HOURS:  XR Abdomen Flat & Upright    Result Date: 8/21/2021  PROCEDURE: XR ABDOMEN FLAT AND UPRIGHT  COMPARISON: [facility], , ABDOMEN FLAT & UPRIGHT, 2/06/2020, 6:37.  INDICATIONS: ABDOMINAL PAIN, BOWEL OBSTRUCTION.  FINDINGS:  There is a nasogastric tube in the stomach.  There are scattered pockets of large and small bowel gas in a nonspecific but nonobstructed pattern.  There is no evidence of free air under the diaphragm on the upright film.  Is the lung bases are clear.  There are degenerative changes of the spine.  There are clips from cholecystectomy.  CONCLUSION: NG tube is in the stomach.  Nonspecific but nonobstructed appearing gas pattern.     JAI OVALLE MD       Electronically Signed and Approved By: JAI OVALLE MD on 8/21/2021 at 6:45           .    EKG/TELEMETRY FROM LAST 24 HOURS:  ECG 12 Lead   Final Result   HEART RATE= 137  bpm   RR Interval= 439  ms   LA Interval=   ms   P Horizontal Axis=   deg   P Front Axis=   deg   QRSD Interval= 85  ms   QT Interval= 339  ms   QRS Axis= 64  deg   T Wave Axis= 0  deg   - ABNORMAL ECG -   Atrial flutter   Repolarization abnormality, prob rate related   No previous ECG available for comparison   Electronically Signed By: Teodoro Miranda ([facility]) 21-Aug-2021 12:49:55   Date and Time of Study: 2021-08-21 03:37:41           CARDIOVASCULAR:  No results found for this or any previous visit.               Result Review    Result Review:  I have personally reviewed the results from the time of the last hospitalist evaluation and agree with these findings:  [x]  Laboratory  []  Microbiology  [x]  Radiology  [x]  EKG/Telemetry   []  Cardiology/Vascular   []  Pathology  []  Old records  []   Other:      Assessment/Plan   Assessment / Plan     Active Hospital Problems:  Active Hospital Problems    Diagnosis    • Atrial flutter (CMS/HCC)    • Small bowel obstruction (CMS/HCC)    • Hypokalemia    • Essential hypertension      1.  Single short episode of atrial flutter with variable conduction  2.  Hypokalemia  3.  Hypertension    4.  small bowel obstruction secondary to adhesions most likely  5.  COPD w/o exacerbation  6.  DM, Insulin Dependent    Improving clinically.  NGT clamped yesterday.  Appreciate Dr. Oden input.  Removal of NGT per Dr. Oden.  Diet advanced yesterday to full liquid  IV fluids continue at 50 cc/h.  Replace orally with 40 mEq x 3 doses.  Check Mag & BMP in a.m.  Continue home regimen for COPD  Continue sliding scale insulin  Amlodipine was discontinued previously  Continue losartan.  Dr. Miranda discussed anticoagulation per his notes.  Holding off on this for now.   Plan d/c home in a.m.         Prophylaxis:  DVT prophylaxis:  Lovenox  Medical DVT prophylaxis orders are present.    Code Status:  Full    Disposition:  I expect patient to be discharged on Monday.    Time Dedicated to Patient Care, Evaluation & Management:  I have spent a total of 30 minutes in evaluation & management of Annita Rodriguez's conditions as described above.      Electronically signed by Matheus Greenberg MD, 08/22/21, 9:40 PM EDT.

## 2021-08-22 NOTE — PROGRESS NOTES
[facility]     Progress Note    Patient Name: Annita Rodriguez  : 1935  MRN: [id number]  Date of admission: 2021    Subjective   Bowels working.  Doing ok w/ full liq diet.  No n/v.  No abd pain.    Objective       Current Diet:    Dietary Orders (From admission, onward)     Start     Ordered    21 1110  Diet Full Liquid  Diet Effective Now     Question:  Diet Texture / Consistency  Answer:  Full Liquid    21 1109                Vitals:   Temp:  [98.4 °F (36.9 °C)-99.7 °F (37.6 °C)] 99 °F (37.2 °C)  Heart Rate:  [60-84] 62  Resp:  [16-20] 18  BP: (118-140)/(52-67) 136/57  Physical Exam   • Constitutional: alert, no acute distress  • Respiratory:  breathing not labored, respiratory effort appears normal  • Cardiovascular:  heart regular rate  • Abdomen:  soft, nontender, nondistendedNeurologic:  no obvious motor or sensory deficits, cerebellar function without any obvious abnormalities, alert & oriented x 3, speech clear    LABS:  Lab Results (last 24 hours)     Procedure Component Value Units Date/Time    POC Glucose Once [[id number]]  (Abnormal) Collected: 21 1240    Specimen: Blood Updated: 21 1241     Glucose 120 mg/dL      Comment: Serial Number: [id number]:  [id number]       POC Glucose Once [[id number]]  (Abnormal) Collected: 21 0825    Specimen: Blood Updated: 21 0830     Glucose 110 mg/dL      Comment: Serial Number: [id number]:  [id number]       Basic Metabolic Panel [[id number]]  (Abnormal) Collected: 21 0408    Specimen: Blood Updated: 21 0457     Glucose 141 mg/dL      BUN 6 mg/dL      Creatinine 0.56 mg/dL      Sodium 140 mmol/L      Potassium 3.1 mmol/L      Chloride 107 mmol/L      CO2 26.1 mmol/L      Calcium 8.4 mg/dL      eGFR  African Amer 125 mL/min/1.73      BUN/Creatinine Ratio 10.7     Anion Gap 6.9 mmol/L     Narrative:      GFR Normal >60  Chronic Kidney Disease <60  Kidney Failure <15      Magnesium [[id number]]   (Normal) Collected: 08/22/21 0408    Specimen: Blood Updated: 08/22/21 0457     Magnesium 2.2 mg/dL     CBC (No Diff) [[id number]]  (Abnormal) Collected: 08/22/21 0408    Specimen: Blood Updated: 08/22/21 0447     WBC 6.49 10*3/mm3      RBC 3.43 10*6/mm3      Hemoglobin 10.6 g/dL      Hematocrit 32.7 %      MCV 95.3 fL      MCH 30.9 pg      MCHC 32.4 g/dL      RDW 15.0 %      RDW-SD 51.8 fl      MPV 9.9 fL      Platelets 205 10*3/mm3     POC Glucose Once [[id number]]  (Abnormal) Collected: 08/21/21 2219    Specimen: Blood Updated: 08/21/21 2220     Glucose 136 mg/dL      Comment: Serial Number: [id number]:  [id number]       POC Glucose Once [[id number]]  (Abnormal) Collected: 08/21/21 1807    Specimen: Blood Updated: 08/21/21 1808     Glucose 143 mg/dL      Comment: Serial Number: [id number]:  [id number]             IMAGING:  Imaging Results (Last 24 Hours)     ** No results found for the last 24 hours. **          []  Laboratory  []  Microbiology  []  Radiology  []  EKG/Telemetry   []  Cardiology/Vascular   []  Pathology  []  Old records    Assessment / Plan   Assessment:   SBO - appears to have resolved     Plan:    D/C NG tube  Regular diet  Can go home anytime from surgical perspective if continues to do well     Electronically signed by Kashif Oden MD, 08/22/21, 1:58 PM EDT.

## 2021-08-22 NOTE — CONSULTS
[facility]   Cardiology Consult Note    Patient Name: Annita Rodriguez  : 1935  MRN: [id number]  Primary Care Physician:  Arias Beck MD  Referring Physician: Alexandr Hammer, *  Date of admission: 2021    Subjective   Subjective     Reason for Consult/ Chief Complaint: Palpitations    HPI:  Annita Rodriguez is a 86 y.o. female with past medical history significant for diabetes and hypertension who came into the hospital with constipation for 1 day.  She was diagnosed with a bowel obstruction.  She did have a nasogastric tube placed and her symptoms have improved.  Yesterday early morning she noticed palpitations.  It started acutely and she felt her heart racing.  She has never felt this sensation before.  She did have an EKG that showed atrial flutter with variable conduction.  They gave her metoprolol and she spontaneously converted back into normal sinus rhythm.  She noted no shortness of breath or chest pain.    Review of Systems   All systems were reviewed and negative except for: Palpitations    Personal History     Past Medical History:   Diagnosis Date   • Arthritis    • Diabetes mellitus (CMS/[facility])    • Hypertension         Past medical history reviewed      Family History: family history is not on file. Otherwise pertinent FHx was reviewed and not pertinent to current issue.    Social History:  reports that she has quit smoking. Her smoking use included cigarettes. She has a 3.75 pack-year smoking history. She has never used smokeless tobacco. She reports that she does not drink alcohol and does not use drugs.    Home Medications:  amLODIPine, arformoterol, budesonide, folic acid, hydroxychloroquine, insulin detemir, metFORMIN, methotrexate, and tiotropium bromide monohydrate    Allergies:  Allergies   Allergen Reactions   • Sulfa Antibiotics Nausea And Vomiting       Objective    Objective     Vitals:   Temp:  [98.4 °F (36.9 °C)-99.7 °F (37.6 °C)] 98.4 °F (36.9  °C)  Heart Rate:  [66-84] 70  Resp:  [16-18] 18  BP: (115-140)/(51-67) 139/61      Physical Exam:   Constitutional: Awake, alert, No acute distress    Eyes: PERRLA, sclerae anicteric, no conjunctival injection   HENT: NCAT, mucous membranes moist   Neck: Supple, no thyromegaly, no lymphadenopathy, trachea midline   Respiratory: Clear to auscultation bilaterally, nonlabored respirations    Cardiovascular: RRR, no murmurs, rubs, or gallops, palpable pedal pulses bilaterally   Gastrointestinal: Positive bowel sounds, soft, nontender, nondistended   Musculoskeletal: No bilateral ankle edema, no clubbing or cyanosis to extremities   Psychiatric: Appropriate affect, cooperative   Neurologic: Oriented x 3, strength symmetric in all extremities, Cranial Nerves grossly intact to confrontation, speech clear   Skin: No rashes     Result Review    Result Review:  I have personally reviewed the results from the time of this admission to 8/22/2021 09:19 EDT and agree with these findings:  [x]  Laboratory  []  Microbiology  [x]  Radiology  [x]  EKG/Telemetry   [x]  Cardiology/Vascular   []  Pathology  [x]  Old records  []  Other:  Most notable findings include:     CMP    CMP 8/20/21 8/21/21 8/22/21   Glucose 170 (A) 173 (A) 141 (A)   BUN 10 8 6 (A)   Creatinine 0.52 (A) 0.64 0.56 (A)   eGFR  Am 136 107 125   Sodium 136 141 140   Potassium 3.7 3.1 (A) 3.1 (A)   Chloride 100 102 107   Calcium 8.7 9.0 8.4 (A)   (A) Abnormal value             CBC    CBC 8/20/21 8/21/21 8/22/21   WBC 8.31 5.72 6.49   RBC 4.06 4.35 3.43 (A)   Hemoglobin 12.4 13.3 10.6 (A)   Hematocrit 37.2 41.3 32.7 (A)   MCV 91.6 94.9 95.3   MCH 30.5 30.6 30.9   MCHC 33.3 32.2 32.4   RDW 14.9 15.4 15.0   Platelets 233 261 205   (A) Abnormal value             Lab Results   Component Value Date    TROPONINT <0.010 08/21/2021         Assessment/Plan   Assessment / Plan     Brief Patient Summary:  Annita Rodriguez is a 86 y.o. female who presented with bowel  obstruction and went into atrial flutter with variable conduction Saturday early morning.  She was given metoprolol and spontaneously converted to normal sinus rhythm.  She felt the palpitations immediately when the atrial flutter occurred.  She has never felt this sensation before.  Her potassium has been low probably due to the GI issues.    Active Hospital Problems:  Active Hospital Problems    Diagnosis    • Small bowel obstruction (CMS/HCC)        Assessment:  1.  Single short episode of atrial flutter with variable conduction  2.  Hypokalemia  3.  Hypertension    Plan:   1.  I did discuss with the patient extensively that her [id number] score is 5.  I did offer her anticoagulation.  I also gave her the option of watching this.  The fact that she was able to immediately feel the palpitations when they started and knew exactly when it stopped is helpful.  She had this single episode at the time that her potassium was 3.1.  She has decided just to continue to monitor for any second occurrence.  I do think this is fine again especially since she was able to feel it immediately.  I would keep her on the low-dose metoprolol.  We will continue to monitor her on telemetry.  2.  Would like to see her potassium back up to the 4.0 range.  3.  Blood pressure is under good control.  4.  Call cardiology if any further SVT is noted on the monitor.  If no further SVT we will just continue to monitor from a distance.    Electronically signed by Teodoro Miranda MD, 08/22/21, 9:19 AM EDT.

## 2021-08-22 NOTE — PLAN OF CARE
Goal Outcome Evaluation:  Plan of Care Reviewed With: patient, daughter        Progress: improving  Outcome Summary: PT NG TUBE CLAMPED ALL NIGHT, NO EPISODES OF NAUSEA, NO CARDIAC EVENTS, NSR. NO NEW COMPLAINTS.Ananya Veloz RN

## 2021-08-22 NOTE — PLAN OF CARE
Goal Outcome Evaluation: NG tube removed today, tolerating regular diet well. Patient has ambulated in jacobson. No cardiac events to report during shift.     Plan of Care Reviewed With: patient        Progress: improving

## 2021-08-23 VITALS
OXYGEN SATURATION: 98 % | BODY MASS INDEX: 21.37 KG/M2 | DIASTOLIC BLOOD PRESSURE: 59 MMHG | SYSTOLIC BLOOD PRESSURE: 130 MMHG | HEART RATE: 63 BPM | TEMPERATURE: 99.1 F | HEIGHT: 63 IN | WEIGHT: 120.59 LBS | RESPIRATION RATE: 18 BRPM

## 2021-08-23 PROBLEM — H93.19 TINNITUS: Status: ACTIVE | Noted: 2021-08-23

## 2021-08-23 LAB
ANION GAP SERPL CALCULATED.3IONS-SCNC: 5 MMOL/L (ref 5–15)
BUN SERPL-MCNC: 6 MG/DL (ref 8–23)
BUN/CREAT SERPL: 12.2 (ref 7–25)
CALCIUM SPEC-SCNC: 8.4 MG/DL (ref 8.6–10.5)
CHLORIDE SERPL-SCNC: 108 MMOL/L (ref 98–107)
CO2 SERPL-SCNC: 25 MMOL/L (ref 22–29)
CREAT SERPL-MCNC: 0.49 MG/DL (ref 0.57–1)
GFR SERPL CREATININE-BSD FRML MDRD: 146 ML/MIN/1.73
GLUCOSE BLDC GLUCOMTR-MCNC: 129 MG/DL (ref 70–99)
GLUCOSE BLDC GLUCOMTR-MCNC: 214 MG/DL (ref 70–99)
GLUCOSE SERPL-MCNC: 138 MG/DL (ref 65–99)
MAGNESIUM SERPL-MCNC: 2.1 MG/DL (ref 1.6–2.4)
POTASSIUM SERPL-SCNC: 4 MMOL/L (ref 3.5–5.2)
SODIUM SERPL-SCNC: 138 MMOL/L (ref 136–145)

## 2021-08-23 PROCEDURE — 94799 UNLISTED PULMONARY SVC/PX: CPT

## 2021-08-23 PROCEDURE — 99239 HOSP IP/OBS DSCHRG MGMT >30: CPT | Performed by: INTERNAL MEDICINE

## 2021-08-23 PROCEDURE — 99231 SBSQ HOSP IP/OBS SF/LOW 25: CPT | Performed by: NURSE PRACTITIONER

## 2021-08-23 PROCEDURE — 97110 THERAPEUTIC EXERCISES: CPT

## 2021-08-23 PROCEDURE — 83735 ASSAY OF MAGNESIUM: CPT | Performed by: INTERNAL MEDICINE

## 2021-08-23 PROCEDURE — 82962 GLUCOSE BLOOD TEST: CPT

## 2021-08-23 PROCEDURE — 25010000002 ENOXAPARIN PER 10 MG: Performed by: HOSPITALIST

## 2021-08-23 PROCEDURE — 80048 BASIC METABOLIC PNL TOTAL CA: CPT | Performed by: INTERNAL MEDICINE

## 2021-08-23 PROCEDURE — 63710000001 INSULIN LISPRO (HUMAN) PER 5 UNITS: Performed by: HOSPITALIST

## 2021-08-23 RX ORDER — LOSARTAN POTASSIUM 50 MG/1
50 TABLET ORAL DAILY
Qty: 30 TABLET | Refills: 0 | Status: SHIPPED | OUTPATIENT
Start: 2021-08-23

## 2021-08-23 RX ADMIN — ENOXAPARIN SODIUM 40 MG: 40 INJECTION SUBCUTANEOUS at 08:38

## 2021-08-23 RX ADMIN — TIOTROPIUM BROMIDE INHALATION SPRAY 2 PUFF: 3.12 SPRAY, METERED RESPIRATORY (INHALATION) at 08:46

## 2021-08-23 RX ADMIN — SODIUM CHLORIDE, PRESERVATIVE FREE 10 ML: 5 INJECTION INTRAVENOUS at 08:39

## 2021-08-23 RX ADMIN — POTASSIUM CHLORIDE 40 MEQ: 10 CAPSULE, COATED, EXTENDED RELEASE ORAL at 08:38

## 2021-08-23 RX ADMIN — ARFORMOTEROL TARTRATE 15 MCG: 15 SOLUTION RESPIRATORY (INHALATION) at 08:46

## 2021-08-23 RX ADMIN — INSULIN LISPRO 4 UNITS: 100 INJECTION, SOLUTION INTRAVENOUS; SUBCUTANEOUS at 12:16

## 2021-08-23 RX ADMIN — DEXTROSE AND SODIUM CHLORIDE 50 ML/HR: 5; 900 INJECTION, SOLUTION INTRAVENOUS at 02:57

## 2021-08-23 RX ADMIN — LOSARTAN POTASSIUM 50 MG: 50 TABLET, FILM COATED ORAL at 08:38

## 2021-08-23 RX ADMIN — BUDESONIDE 0.5 MG: 0.5 INHALANT ORAL at 08:46

## 2021-08-23 RX ADMIN — METOPROLOL TARTRATE 12.5 MG: 25 TABLET, FILM COATED ORAL at 08:37

## 2021-08-23 NOTE — PLAN OF CARE
Goal Outcome Evaluation:     Patient follow-up.  Patient eating 75% of regular diet.  Probable discharge home today.

## 2021-08-23 NOTE — DISCHARGE SUMMARY
[facility]         HOSPITALIST  DISCHARGE SUMMARY    Patient Name: Annita Rodriguez  : 1935  MRN: [id number]    Date of Admission: 2021  Date of Discharge:  2021  Primary Care Physician: Arias Beck MD    Consults     Date and Time Order Name Status Description    2021  8:13 PM Inpatient Cardiology Consult      2021 12:49 AM Inpatient Hospitalist Consult Completed     2021 12:49 AM IP Consult to General Surgery Completed           Active and Resolved Hospital Problems:  Small bowel obstruction likely secondary to adhesions  Transient atrial flutter with variable conduction  Hypokalemia  Hypertension    Hospital Course     Hospital Course:  Annita Rodriguez is a 86 y.o. female with past medical history significant for diabetes and hypertension who came into the hospital with constipation for 1 day.  She was diagnosed with a bowel obstruction.  She did have a nasogastric tube placed and her symptoms have improved.  Yesterday early morning she noticed palpitations.  It started acutely and she felt her heart racing.  She has never felt this sensation before.  She did have an EKG that showed atrial flutter with variable conduction.  They gave her metoprolol and she spontaneously converted back into normal sinus rhythm.  She noted no shortness of breath or chest pain.  This morning patient reports feeling pretty well.  She is eating breakfast, passing gas, moving her bowels, she does have some abdominal soreness however this is improved from yesterday.  Patient discharged in stable condition.    Day of Discharge     Vital Signs:  Temp:  [98.2 °F (36.8 °C)-99.3 °F (37.4 °C)] 99.1 °F (37.3 °C)  Heart Rate:  [63-86] 63  Resp:  [16-18] 18  BP: (114-139)/(59-72) 130/59    Physical Exam:   General appearance: NAD, conversant   Eyes: anicteric sclerae, moist conjunctivae; no lid-lag; PERRLA  HENT: Atraumatic; oropharynx clear with moist mucous membranes and no  mucosal ulcerations; normal hard and soft palate  Neck: Trachea midline; FROM, supple, no thyromegaly or lymphadenopathy  Lungs: CTA, with normal respiratory effort and no intercostal retractions  CV: Regular Rate and Rhythm, no Murmurs, Rubs, or Gallops   Abdomen: Soft, non-tender; no masses or Heptosplenomegally  Extremities: No peripheral edema or extremity lymphadenopathy  Skin: Normal temperature, turgor and texture; no rash, ulcers or subcutaneous nodules  Psych: Appropriate affect, alert and oriented to person, place and time  Neuro: CN II - XII intact no motor deficits, no sensory defecits      Discharge Details        Discharge Medications      New Medications      Instructions Start Date   losartan 50 MG tablet  Commonly known as: COZAAR   50 mg, Oral, Daily      metoprolol tartrate 25 MG tablet  Commonly known as: LOPRESSOR   12.5 mg, Oral, 2 Times Daily         Continue These Medications      Instructions Start Date   Brovana 15 MCG/2ML nebulizer solution  Generic drug: arformoterol   15 mcg, Nebulization, 2 Times Daily - RT      budesonide 0.5 MG/2ML nebulizer solution  Commonly known as: PULMICORT   0.5 mg, Nebulization, 2 times daily      folic acid 1 MG tablet  Commonly known as: FOLVITE   1 mg, Oral, Daily      hydroxychloroquine 200 MG tablet  Commonly known as: PLAQUENIL   200 mg, Oral, 2 Times Daily      insulin detemir 100 UNIT/ML injection  Commonly known as: LEVEMIR   6 Units, Subcutaneous, Nightly      metFORMIN 500 MG tablet  Commonly known as: GLUCOPHAGE   500 mg, Oral, 2 Times Daily With Meals      methotrexate 2.5 MG tablet   25 mg, Oral, Weekly      Spiriva Respimat 2.5 MCG/ACT aerosol solution inhaler  Generic drug: tiotropium bromide monohydrate   2 puffs, Inhalation, Daily - RT         Stop These Medications    amLODIPine 10 MG tablet  Commonly known as: NORVASC            Allergies   Allergen Reactions   • Sulfa Antibiotics Nausea And Vomiting       Discharge Disposition:  Home or  Self Care    Diet:  Hospital:  Diet Order   Procedures   • Diet Regular       Discharge Activity:       CODE STATUS:  Code Status and Medical Interventions:   Ordered at: 08/20/21 0409     Level Of Support Discussed With:    Patient     Code Status:    CPR     Medical Interventions (Level of Support Prior to Arrest):    Full       Future Appointments   Date Time Provider Department Center   12/3/2021  8:15 AM Dottie Avila APRN [facility]       Additional Instructions for the Follow-ups that You Need to Schedule     Discharge Follow-up with PCP   As directed       Currently Documented PCP:    Arias Beck MD    PCP Phone Number:    [phone]     Follow Up Details: 3 to 7 days         Discharge Follow-up with Specified Provider: general surgery; 2 Weeks   As directed      To: general surgery    Follow Up: 2 Weeks               Pertinent  and/or Most Recent Results     IMAGING:  XR Abdomen Flat & Upright    Result Date: 8/21/2021  PROCEDURE: XR ABDOMEN FLAT AND UPRIGHT  COMPARISON: [facility], CR, ABDOMEN FLAT & UPRIGHT, 2/06/2020, 6:37.  INDICATIONS: ABDOMINAL PAIN, BOWEL OBSTRUCTION.  FINDINGS:  There is a nasogastric tube in the stomach.  There are scattered pockets of large and small bowel gas in a nonspecific but nonobstructed pattern.  There is no evidence of free air under the diaphragm on the upright film.  Is the lung bases are clear.  There are degenerative changes of the spine.  There are clips from cholecystectomy.  CONCLUSION: NG tube is in the stomach.  Nonspecific but nonobstructed appearing gas pattern.     JAI OVALLE MD       Electronically Signed and Approved By: JAI OVALLE MD on 8/21/2021 at 6:45             CT Abdomen Pelvis With Contrast    Result Date: 8/20/2021  PROCEDURE: CT ABDOMEN PELVIS W CONTRAST  COMPARISON: [facility], CT, ABDOMEN/PELVIS WITH CONTRAST, 1/30/2020, 11:35.  INDICATIONS: abdominal pain.  MID ABDOMINAL PAIN X 1 DAY   TECHNIQUE: After obtaining the patient's consent, CT images were created with non-ionic intravenous contrast material.   PROTOCOL:   Standard imaging protocol performed    RADIATION:   DLP: 302.2mGy*cm   Automated exposure control was utilized to minimize radiation dose. CONTRAST: 75 cc Isovue 370 I.V.  FINDINGS:  There is minimal right basilar atelectasis.  The lung bases are otherwise clear.  There are clips from cholecystectomy.  The liver is normal.  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  The right kidney is normal.  There is is a normal appearance of the pancreatic tissue.  The spleen appears normal.  There are multiple dilated loops of proximal and mid small bowel with decompression of the distal small bowel.  The transition point appears to be in the right mid abdomen.  There is fluid surrounding several of the dilated loops of bowel.  Etiology is most likely adhesions.  The colon is decompressed and appears normal.  There is no gross free air.  There is no pneumatosis intestinalis.  CONCLUSION: Multiple dilated loops of fluid-filled small bowel with surrounding fat stranding.  This is consistent with bowel obstruction and appears to be proximal and mid bowel loops involved with transition point in the right mid abdomen.  The ileum and colon are decompressed.     JAI OVALLE MD       Electronically Signed and Approved By: JAI OVALLE MD on 8/20/2021 at 0:28             XR Abdomen KUB    Result Date: 8/20/2021  PROCEDURE: XR ABDOMEN KUB  COMPARISON: [facility], CR, ABDOMEN FLAT & UPRIGHT, 2/06/2020, 6:37.  INDICATIONS: CHECK NG TUBE PLACEMENT  FINDINGS:  There is a nasogastric tube in the stomach.  There is mild small bowel dilatation with loops of bowel dilated left upper quadrant of the abdomen.  CONCLUSION: Nasogastric tube is in stomach.     JAI OVALLE MD       Electronically Signed and Approved By: JAI OVALLE MD on 8/20/2021 at 5:29               LAB RESULTS:      Lab  08/22/21  0408 08/21/21  0404 08/20/21  0459 08/19/21 2125   WBC 6.49 5.72 8.31 7.28   HEMOGLOBIN 10.6* 13.3 12.4 13.1   HEMATOCRIT 32.7* 41.3 37.2 38.7   PLATELETS 205 261 233 254   NEUTROS ABS  --  4.41 6.81 6.49   IMMATURE GRANS (ABS)  --  0.01 0.03 0.02   LYMPHS ABS  --  0.52* 0.63* 0.43*   MONOS ABS  --  0.55 0.81 0.32   EOS ABS  --  0.22 0.00 0.00   MCV 95.3 94.9 91.6 91.5         Lab 08/23/21  0517 08/22/21  0408 08/21/21  0404 08/20/21 0459 08/19/21 2125   SODIUM 138 140 141 136 134*   POTASSIUM 4.0 3.1* 3.1* 3.7 4.0   CHLORIDE 108* 107 102 100 98   CO2 25.0 26.1 27.8 23.8 22.6   ANION GAP 5.0 6.9 11.2 12.2 13.4   BUN 6* 6* 8 10 14   CREATININE 0.49* 0.56* 0.64 0.52* 0.57   GLUCOSE 138* 141* 173* 170* 195*   CALCIUM 8.4* 8.4* 9.0 8.7 9.8   MAGNESIUM 2.1 2.2 1.9 2.0  --    PHOSPHORUS  --   --  3.0 3.1  --          Lab 08/19/21 2125   TOTAL PROTEIN 7.4   ALBUMIN 4.70   GLOBULIN 2.7   ALT (SGPT) 10   AST (SGOT) 13   BILIRUBIN 0.6   ALK PHOS 89   LIPASE 25         Lab 08/21/21  0404   TROPONIN T <0.010                 Brief Urine Lab Results  (Last result in the past 365 days)      Color   Clarity   Blood   Leuk Est   Nitrite   Protein   CREAT   Urine HCG        08/20/21 0351 Yellow Clear Trace Small (1+) Negative Negative             Microbiology Results (last 10 days)     ** No results found for the last 240 hours. **            Time spent on Discharge including face to face service: Greater than 30 minutes      Electronically signed by Josef Olmedo MD, 08/23/21, 1:36 PM EDT.

## 2021-08-23 NOTE — PLAN OF CARE
Goal Outcome Evaluation:  Plan of Care Reviewed With: patient    PATIENT REMAINS ON RA, NO N/V, PATIENT ANTICIPATING DC HOME TODAY

## 2021-08-23 NOTE — THERAPY TREATMENT NOTE
Patient Name: Annita Rodriguez  : 1935    MRN: [id number]                              Today's Date: 2021       Admit Date: 2021    Visit Dx:     ICD-10-CM ICD-9-CM   1. Small bowel obstruction (CMS/HCC)  K56.609 560.9   2. Generalized abdominal pain  R10.84 789.07   3. Difficulty in walking  R26.2 719.7   4. Decreased activities of daily living (ADL)  Z78.9 V49.89     Patient Active Problem List   Diagnosis   • Small bowel obstruction (CMS/HCC)   • Atrial flutter (CMS/HCC)   • Hypokalemia   • Essential hypertension     Past Medical History:   Diagnosis Date   • Arthritis    • Diabetes mellitus (CMS/HCC)    • Hypertension      Past Surgical History:   Procedure Laterality Date   • HYSTERECTOMY     • OOPHORECTOMY Bilateral    • VEIN LIGATION AND STRIPPING       General Information     Row Name 21 1146          OT Time and Intention    Document Type  therapy note (daily note)  -AV     Mode of Treatment  individual therapy;occupational therapy  -AV     Row Name 21 1146          General Information    Existing Precautions/Restrictions  fall  -AV     Barriers to Rehab  none identified  -AV     Row Name 21 1146          Safety Issues, Functional Mobility    Impairments Affecting Function (Mobility)  balance;endurance/activity tolerance  -AV       User Key  (r) = Recorded By, (t) = Taken By, (c) = Cosigned By    Initials Name Provider Type    AV Yobany Llamas OT Occupational Therapist          Mobility/ADL's    No documentation.       Obj/Interventions     Row Name 21 1146          Shoulder (Therapeutic Exercise)    Shoulder (Therapeutic Exercise)  strengthening exercise Endurance exercises  -AV     Shoulder Strengthening (Therapeutic Exercise)  bilateral;flexion;horizontal aBduction/aDduction;1 lb free weight;sitting X20  -AV     Row Name 21 1146          Elbow/Forearm (Therapeutic Exercise)    Elbow/Forearm (Therapeutic Exercise)  strengthening exercise Endurance  exercises  -AV     Elbow/Forearm Strengthening (Therapeutic Exercise)  bilateral;flexion;extension;supination;pronation;1 lb free weight X20  -AV     Row Name 08/23/21 1146          Motor Skills    Motor Skills  therapeutic exercise  -AV     Row Name 08/23/21 1146          Therapeutic Exercise    Therapeutic Exercise  shoulder;elbow/forearm  -AV       User Key  (r) = Recorded By, (t) = Taken By, (c) = Cosigned By    Initials Name Provider Type    Yobany Le OT Occupational Therapist        Goals/Plan    No documentation.       Clinical Impression     Row Name 08/23/21 1147          Pain Scale: Numbers Pre/Post-Treatment    Pretreatment Pain Rating  0/10 - no pain  -AV     Posttreatment Pain Rating  0/10 - no pain  -AV     Row Name 08/23/21 1147          Plan of Care Review    Progress  improving  -AV     Outcome Summary  Patient presents with limitations of endurance/activity tolerance which impede her ability to perform ADL and transfers as prior.  The skills of a therapist will be required to safely and effectively implement treatment plan to restore maximal level of function.  -AV     Row Name 08/23/21 1147          Vital Signs    O2 Delivery Pre Treatment  room air  -AV     O2 Delivery Intra Treatment  room air  -AV     O2 Delivery Post Treatment  room air  -AV       User Key  (r) = Recorded By, (t) = Taken By, (c) = Cosigned By    Initials Name Provider Type    Yobany Le OT Occupational Therapist        Outcome Measures     Row Name 08/23/21 1149          How much help from another is currently needed...    Putting on and taking off regular lower body clothing?  3  -AV     Bathing (including washing, rinsing, and drying)  4  -AV     Toileting (which includes using toilet bed pan or urinal)  4  -AV     Putting on and taking off regular upper body clothing  4  -AV     Taking care of personal grooming (such as brushing teeth)  4  -AV     Eating meals  4  -AV     AM-PAC 6 Clicks Score (OT)  23  -AV      Row Name 08/23/21 0745          How much help from another person do you currently need...    Turning from your back to your side while in flat bed without using bedrails?  4  -AS     Moving from lying on back to sitting on the side of a flat bed without bedrails?  4  -AS     Moving to and from a bed to a chair (including a wheelchair)?  4  -AS     Standing up from a chair using your arms (e.g., wheelchair, bedside chair)?  4  -AS     Climbing 3-5 steps with a railing?  3  -AS     To walk in hospital room?  3  -AS     AM-PAC 6 Clicks Score (PT)  22  -AS     Row Name 08/23/21 1149          Optimal Instrument    Bending/Stooping  1  -AV     Standing  2  -AV     Reaching  1  -AV       User Key  (r) = Recorded By, (t) = Taken By, (c) = Cosigned By    Initials Name Provider Type    AS Erendira Whitlock, RN Registered Nurse    Yobany Le OT Occupational Therapist          Occupational Therapy Education                 Title: PT OT SLP Therapies (In Progress)     Topic: Occupational Therapy (Not Started)     Point: ADL training (Not Started)     Description:   Instruct learner(s) on proper safety adaptation and remediation techniques during self care or transfers.   Instruct in proper use of assistive devices.              Learner Progress:  Not documented in this visit.          Point: Home exercise program (Not Started)     Description:   Instruct learner(s) on appropriate technique for monitoring, assisting and/or progressing therapeutic exercises/activities.              Learner Progress:  Not documented in this visit.          Point: Precautions (Not Started)     Description:   Instruct learner(s) on prescribed precautions during self-care and functional transfers.              Learner Progress:  Not documented in this visit.          Point: Body mechanics (Not Started)     Description:   Instruct learner(s) on proper positioning and spine alignment during self-care, functional mobility activities and/or  exercises.              Learner Progress:  Not documented in this visit.                          OT Recommendation and Plan  Planned Therapy Interventions (OT): activity tolerance training, BADL retraining, functional balance retraining, IADL retraining, occupation/activity based interventions, patient/caregiver education/training, transfer/mobility retraining  Therapy Frequency (OT): 5 times/wk  Plan of Care Review  Progress: improving  Outcome Summary: Patient presents with limitations of endurance/activity tolerance which impede her ability to perform ADL and transfers as prior.  The skills of a therapist will be required to safely and effectively implement treatment plan to restore maximal level of function.     Time Calculation:   Time Calculation- OT     Row Name 08/23/21 1149             Time Calculation- OT    OT Received On  08/23/21  -AV      OT Goal Re-Cert Due Date  08/29/21  -AV         Timed Charges    12484 - OT Therapeutic Exercise Minutes  10  -AV         Total Minutes    Timed Charges Total Minutes  10  -AV       Total Minutes  10  -AV        User Key  (r) = Recorded By, (t) = Taken By, (c) = Cosigned By    Initials Name Provider Type    AV Yobany Llamas OT Occupational Therapist        Therapy Charges for Today     Code Description Service Date Service Provider Modifiers Qty    [id number]  OT THER PROC EA 15 MIN 8/23/2021 Yobany Llamas OT GO 1               Yobany Llamas OT  8/23/2021

## 2021-08-23 NOTE — PROGRESS NOTES
[facility]     Progress Note    Patient Name: Annita Rodriguez  : 1935  MRN: [id number]    Date of admission: 2021    Subjective   Subjective     Patient without complaints,  Denies pain.  Tolerating regular diet.  Positive flatus.     Objective   Objective     Vitals:   Temp:  [98.2 °F (36.8 °C)-99.3 °F (37.4 °C)] 98.2 °F (36.8 °C)  Heart Rate:  [67-86] 69  Resp:  [16-18] 16  BP: (114-139)/(61-72) 134/63  Physical Exam    Constitutional: Awake, alert   Eyes: PERRLA    Neck: Supple, trachea midline   Respiratory: respirations even and unlabored   Cardiovascular: RRR   Gastrointestinal: Soft, nontender, nondistended   Psychiatric: Appropriate affect, cooperative   Neurologic: Oriented x 3, speech clear   Skin: No rashes     Result Review    Result Review:  I have personally reviewed the results from the time of this admission to 2021 13:08 EDT and agree with these findings:  []  Laboratory  []  Microbiology  []  Radiology  []  EKG/Telemetry   []  Cardiology/Vascular   []  Pathology  []  Old records  []  Other:      Assessment/Plan   Assessment / Plan     Diagnosis     Bowel obstruction  Active Hospital Problems:  Active Hospital Problems    Diagnosis    • Atrial flutter (CMS/HCC)    • Hypokalemia    • Essential hypertension    • Small bowel obstruction (CMS/HCC)        Plan:    Ok to DC home   DVT prophylaxis:  Medical DVT prophylaxis orders are present.          This document has been electronically signed by MORGAN Doll  2021 13:08 EDT

## 2021-08-24 ENCOUNTER — READMISSION MANAGEMENT (OUTPATIENT)
Dept: CALL CENTER | Facility: HOSPITAL | Age: 86
End: 2021-08-24

## 2021-08-24 NOTE — OUTREACH NOTE
Prep Survey      Responses   [facility] facility patient discharged from?  [facility]   Is LACE score < 7 ?  No   Emergency Room discharge w/ pulse ox?  No   Eligibility  Readm Mgmt   Discharge diagnosis  SBO   Does the patient have one of the following disease processes/diagnoses(primary or secondary)?  Other   Does the patient have Home health ordered?  No   Is there a DME ordered?  No   Comments regarding appointments  call for apmts   Prep survey completed?  Yes          Samanta Jarvis RN

## 2021-08-25 ENCOUNTER — READMISSION MANAGEMENT (OUTPATIENT)
Dept: CALL CENTER | Facility: HOSPITAL | Age: 86
End: 2021-08-25

## 2021-08-25 NOTE — OUTREACH NOTE
Medical Week 1 Survey      Responses   [facility] patient discharged from?  [facility]   Does the patient have one of the following disease processes/diagnoses(primary or secondary)?  Other   Week 1 attempt successful?  Yes   Call start time  1345   Call end time  1359   Discharge diagnosis  SBO   Is patient permission given to speak with other caregiver?  Yes   Person spoke with today (if not patient) and relationship  Jere York  [phone]   Meds reviewed with patient/caregiver?  Yes   Is the patient having any side effects they believe may be caused by any medication additions or changes?  No   Does the patient have all medications ordered at discharge?  Yes   Is the patient taking all medications as directed (includes completed medication regime)?  Yes   Comments regarding appointments  Will call for An appt Dr. Oden   Does the patient have a primary care provider?   Yes   Does the patient have an appointment with their PCP within 7 days of discharge?  Yes   Comments regarding PCP  8/25/21 Completed.    Has the patient kept scheduled appointments due by today?  Yes   Psychosocial issues?  No   Did the patient receive a copy of their discharge instructions?  Yes   Nursing interventions  Reviewed instructions with patient   What is the patient's perception of their health status since discharge?  Improving   Is the patient/caregiver able to teach back signs and symptoms related to disease process for when to call PCP?  Yes   Is the patient/caregiver able to teach back signs and symptoms related to disease process for when to call 911?  Yes   Is the patient/caregiver able to teach back the hierarchy of who to call/visit for symptoms/problems? PCP, Specialist, Home health nurse, Urgent Care, ED, 911  Yes   If the patient is a current smoker, are they able to teach back resources for cessation?  Not a smoker   Week 1 call completed?  Yes   Wrap up additional comments  Pt wished for nori York to  be added as contact. Pt denies any issues at this time and reports she is having BMs . Pt denies any palpatations or heart racing. Encouraged Pt to call her Dr is any issues.           Elinor Ramos RN

## 2021-09-01 ENCOUNTER — READMISSION MANAGEMENT (OUTPATIENT)
Dept: CALL CENTER | Facility: HOSPITAL | Age: 86
End: 2021-09-01

## 2021-09-10 ENCOUNTER — OFFICE VISIT (OUTPATIENT)
Dept: SURGERY | Facility: CLINIC | Age: 86
End: 2021-09-10

## 2021-09-10 VITALS — HEIGHT: 63 IN | BODY MASS INDEX: 21.86 KG/M2 | RESPIRATION RATE: 16 BRPM | WEIGHT: 123.4 LBS

## 2021-09-10 DIAGNOSIS — K56.609 SMALL BOWEL OBSTRUCTION (HCC): Primary | ICD-10-CM

## 2021-09-10 PROCEDURE — 99212 OFFICE O/P EST SF 10 MIN: CPT | Performed by: SURGERY

## 2021-09-10 RX ORDER — INSULIN DETEMIR 100 [IU]/ML
6 INJECTION, SOLUTION SUBCUTANEOUS NIGHTLY
COMMUNITY
Start: 2021-07-22

## 2021-09-10 RX ORDER — PEN NEEDLE, DIABETIC 32GX 5/32"
NEEDLE, DISPOSABLE MISCELLANEOUS
Status: ON HOLD | COMMUNITY
Start: 2021-07-28 | End: 2021-10-09

## 2021-09-10 NOTE — PROGRESS NOTES
Chief Complaint:  Post-op         History of Present Illness  Annita Rodriguez is a 86 y.o. female referred by Dr. Olmedo from the hospital after the patient was in the hospital for about 2 days for a small bowel obstruction presumably secondary to adhesions as the patient has had prior abdominal surgeries and has had prior small bowel obstruction that resolved with medical management.  Patient small bowel obstruction improved quickly on its own.  She went home from the hospital without any problems.  She continues to do well.  No abdominal pain and her bowels are working fine.  No complaints today.    Allergies: Sulfa antibiotics    Current Outpatient Medications:   •  BD Pen Needle Neli 2nd Gen 32G X 4 MM misc, USE FOR INSULIN INJECTIONS DAILY, Disp: , Rfl:   •  Brovana 15 MCG/2ML nebulizer solution, Take 15 mcg by nebulization 2 (Two) Times a Day., Disp: , Rfl:   •  budesonide (PULMICORT) 0.5 MG/2ML nebulizer solution, Take 0.5 mg by nebulization 2 (two) times a day., Disp: , Rfl:   •  folic acid (FOLVITE) 1 MG tablet, Take 1 mg by mouth Daily., Disp: , Rfl:   •  hydroxychloroquine (PLAQUENIL) 200 MG tablet, Take 200 mg by mouth 2 (Two) Times a Day., Disp: , Rfl:   •  insulin detemir (LEVEMIR) 100 UNIT/ML injection, Inject 6 Units under the skin into the appropriate area as directed Every Night., Disp: , Rfl:   •  Levemir FlexTouch 100 UNIT/ML injection, ADMINISTER 10 UNITS UNDER THE SKIN AT BEDTIME, Disp: , Rfl:   •  losartan (COZAAR) 50 MG tablet, Take 1 tablet by mouth Daily., Disp: 30 tablet, Rfl: 0  •  metFORMIN (GLUCOPHAGE) 500 MG tablet, Take 500 mg by mouth 2 (Two) Times a Day With Meals., Disp: , Rfl:   •  methotrexate 2.5 MG tablet, Take 25 mg by mouth 1 (One) Time Per Week., Disp: , Rfl:   •  metoprolol tartrate (LOPRESSOR) 25 MG tablet, Take 0.5 tablets by mouth 2 (Two) Times a Day for 30 days., Disp: 30 tablet, Rfl: 0  •  Pramipexole Dihydrochloride (MIRAPEX PO), Take  by mouth 1 (One) Time Per  "Week., Disp: , Rfl:   •  Spiriva Respimat 2.5 MCG/ACT aerosol solution inhaler, Inhale 2 puffs Daily., Disp: , Rfl:     Past Medical History:   • Arthritis   • Diabetes mellitus (CMS/HCC)   • Hypertension   • Tinnitus        Past Surgical History:   • HYSTERECTOMY   • OOPHORECTOMY   • VEIN LIGATION AND STRIPPING       Family History:   Family History   Problem Relation Age of Onset   • Stroke Father    • Heart disease Father    • Diabetes Father         Social History:  Social History     Tobacco Use   • Smoking status: Former Smoker     Packs/day: 0.25     Years: 15.00     Pack years: 3.75     Types: Cigarettes   • Smokeless tobacco: Never Used   Substance Use Topics   • Alcohol use: Never       Objective     Vital Signs:  Resp 16   Ht 160 cm (63\")   Wt 56 kg (123 lb 6.4 oz)   BMI 21.86 kg/m²   • Constitutional: here alone, alert, no acute distress, reliable historian  • HENT:  NCAT, no visible deformities or lesions, wearing glasses  • Respiratory:  breathing not labored, respiratory effort appears normal  • Abdomen:  soft, nontender, nondistended    • Neurologic:  no obvious motor or sensory deficits, normal gait, able to stand without difficulty, cerebellar function without any obvious abnormalities, alert & oriented x 3, speech clear  • Psychiatric:  judgment and insight intact, mood normal, affect appropriate, cooperative      Assessment:  Small bowel obstruction - resolved    Plan:  No further treatment or evaluation necessary from a surgical perspective.  Patient can follow-up with her primary care physician for her routine medical issues..    Kashif Oden MD  09/10/2021    Electronically signed by Kashif Oden MD, 09/10/21, 2:40 PM EDT.        "

## 2021-10-08 ENCOUNTER — HOSPITAL ENCOUNTER (INPATIENT)
Facility: HOSPITAL | Age: 86
LOS: 3 days | Discharge: HOME | End: 2021-10-11
Attending: EMERGENCY MEDICINE | Admitting: INTERNAL MEDICINE

## 2021-10-08 ENCOUNTER — APPOINTMENT (OUTPATIENT)
Dept: CT IMAGING | Facility: HOSPITAL | Age: 86
End: 2021-10-08

## 2021-10-08 DIAGNOSIS — N39.0 ACUTE UTI: ICD-10-CM

## 2021-10-08 DIAGNOSIS — K56.609 SMALL BOWEL OBSTRUCTION (HCC): Primary | ICD-10-CM

## 2021-10-08 LAB
ALBUMIN SERPL-MCNC: 4.4 G/DL (ref 3.5–5.2)
ALBUMIN/GLOB SERPL: 1.6 G/DL
ALP SERPL-CCNC: 80 U/L (ref 39–117)
ALT SERPL W P-5'-P-CCNC: 11 U/L (ref 1–33)
ANION GAP SERPL CALCULATED.3IONS-SCNC: 14.5 MMOL/L (ref 5–15)
AST SERPL-CCNC: 14 U/L (ref 1–32)
BACTERIA UR QL AUTO: ABNORMAL /HPF
BASOPHILS # BLD AUTO: 0.03 10*3/MM3 (ref 0–0.2)
BASOPHILS NFR BLD AUTO: 0.4 % (ref 0–1.5)
BILIRUB SERPL-MCNC: 0.6 MG/DL (ref 0–1.2)
BILIRUB UR QL STRIP: NEGATIVE
BUN SERPL-MCNC: 11 MG/DL (ref 8–23)
BUN/CREAT SERPL: 17.5 (ref 7–25)
CALCIUM SPEC-SCNC: 9.3 MG/DL (ref 8.6–10.5)
CHLORIDE SERPL-SCNC: 100 MMOL/L (ref 98–107)
CLARITY UR: CLEAR
CO2 SERPL-SCNC: 21.5 MMOL/L (ref 22–29)
COLOR UR: YELLOW
CREAT SERPL-MCNC: 0.63 MG/DL (ref 0.57–1)
D-LACTATE SERPL-SCNC: 1.2 MMOL/L (ref 0.5–2)
DEPRECATED RDW RBC AUTO: 48.1 FL (ref 37–54)
EOSINOPHIL # BLD AUTO: 0.03 10*3/MM3 (ref 0–0.4)
EOSINOPHIL NFR BLD AUTO: 0.4 % (ref 0.3–6.2)
ERYTHROCYTE [DISTWIDTH] IN BLOOD BY AUTOMATED COUNT: 14.5 % (ref 12.3–15.4)
GFR SERPL CREATININE-BSD FRML MDRD: 109 ML/MIN/1.73
GLOBULIN UR ELPH-MCNC: 2.7 GM/DL
GLUCOSE SERPL-MCNC: 156 MG/DL (ref 65–99)
GLUCOSE UR STRIP-MCNC: NEGATIVE MG/DL
HCT VFR BLD AUTO: 39.1 % (ref 34–46.6)
HGB BLD-MCNC: 13.1 G/DL (ref 12–15.9)
HGB UR QL STRIP.AUTO: NEGATIVE
HOLD SPECIMEN: NORMAL
HOLD SPECIMEN: NORMAL
HYALINE CASTS UR QL AUTO: ABNORMAL /LPF
IMM GRANULOCYTES # BLD AUTO: 0.02 10*3/MM3 (ref 0–0.05)
IMM GRANULOCYTES NFR BLD AUTO: 0.3 % (ref 0–0.5)
KETONES UR QL STRIP: ABNORMAL
LEUKOCYTE ESTERASE UR QL STRIP.AUTO: ABNORMAL
LIPASE SERPL-CCNC: 20 U/L (ref 13–60)
LYMPHOCYTES # BLD AUTO: 1.03 10*3/MM3 (ref 0.7–3.1)
LYMPHOCYTES NFR BLD AUTO: 14.9 % (ref 19.6–45.3)
MCH RBC QN AUTO: 30.8 PG (ref 26.6–33)
MCHC RBC AUTO-ENTMCNC: 33.5 G/DL (ref 31.5–35.7)
MCV RBC AUTO: 91.8 FL (ref 79–97)
MONOCYTES # BLD AUTO: 0.51 10*3/MM3 (ref 0.1–0.9)
MONOCYTES NFR BLD AUTO: 7.4 % (ref 5–12)
NEUTROPHILS NFR BLD AUTO: 5.29 10*3/MM3 (ref 1.7–7)
NEUTROPHILS NFR BLD AUTO: 76.6 % (ref 42.7–76)
NITRITE UR QL STRIP: POSITIVE
NRBC BLD AUTO-RTO: 0 /100 WBC (ref 0–0.2)
PH UR STRIP.AUTO: 6.5 [PH] (ref 5–8)
PLATELET # BLD AUTO: 276 10*3/MM3 (ref 140–450)
PMV BLD AUTO: 9.9 FL (ref 6–12)
POTASSIUM SERPL-SCNC: 3.7 MMOL/L (ref 3.5–5.2)
PROT SERPL-MCNC: 7.1 G/DL (ref 6–8.5)
PROT UR QL STRIP: NEGATIVE
RBC # BLD AUTO: 4.26 10*6/MM3 (ref 3.77–5.28)
RBC # UR: ABNORMAL /HPF
REF LAB TEST METHOD: ABNORMAL
SODIUM SERPL-SCNC: 136 MMOL/L (ref 136–145)
SP GR UR STRIP: 1.02 (ref 1–1.03)
SQUAMOUS #/AREA URNS HPF: ABNORMAL /HPF
UROBILINOGEN UR QL STRIP: ABNORMAL
WBC # BLD AUTO: 6.91 10*3/MM3 (ref 3.4–10.8)
WBC UR QL AUTO: ABNORMAL /HPF
WHOLE BLOOD HOLD SPECIMEN: NORMAL
WHOLE BLOOD HOLD SPECIMEN: NORMAL

## 2021-10-08 PROCEDURE — 25010000002 MORPHINE PER 10 MG: Performed by: EMERGENCY MEDICINE

## 2021-10-08 PROCEDURE — 0 IOPAMIDOL PER 1 ML: Performed by: EMERGENCY MEDICINE

## 2021-10-08 PROCEDURE — 83605 ASSAY OF LACTIC ACID: CPT | Performed by: EMERGENCY MEDICINE

## 2021-10-08 PROCEDURE — 80053 COMPREHEN METABOLIC PANEL: CPT | Performed by: EMERGENCY MEDICINE

## 2021-10-08 PROCEDURE — 99223 1ST HOSP IP/OBS HIGH 75: CPT | Performed by: PHYSICIAN ASSISTANT

## 2021-10-08 PROCEDURE — 25010000002 ONDANSETRON PER 1 MG: Performed by: EMERGENCY MEDICINE

## 2021-10-08 PROCEDURE — 81001 URINALYSIS AUTO W/SCOPE: CPT | Performed by: EMERGENCY MEDICINE

## 2021-10-08 PROCEDURE — 74177 CT ABD & PELVIS W/CONTRAST: CPT

## 2021-10-08 PROCEDURE — 85025 COMPLETE CBC W/AUTO DIFF WBC: CPT

## 2021-10-08 PROCEDURE — 36415 COLL VENOUS BLD VENIPUNCTURE: CPT

## 2021-10-08 PROCEDURE — 83690 ASSAY OF LIPASE: CPT | Performed by: EMERGENCY MEDICINE

## 2021-10-08 PROCEDURE — 25010000002 CEFTRIAXONE PER 250 MG: Performed by: EMERGENCY MEDICINE

## 2021-10-08 PROCEDURE — 99284 EMERGENCY DEPT VISIT MOD MDM: CPT

## 2021-10-08 RX ORDER — ONDANSETRON 2 MG/ML
4 INJECTION INTRAMUSCULAR; INTRAVENOUS ONCE
Status: COMPLETED | OUTPATIENT
Start: 2021-10-08 | End: 2021-10-08

## 2021-10-08 RX ORDER — MORPHINE SULFATE 2 MG/ML
2 INJECTION, SOLUTION INTRAMUSCULAR; INTRAVENOUS ONCE
Status: COMPLETED | OUTPATIENT
Start: 2021-10-08 | End: 2021-10-08

## 2021-10-08 RX ORDER — CEFTRIAXONE SODIUM 1 G/50ML
1 INJECTION, SOLUTION INTRAVENOUS ONCE
Status: COMPLETED | OUTPATIENT
Start: 2021-10-08 | End: 2021-10-09

## 2021-10-08 RX ORDER — SODIUM CHLORIDE 0.9 % (FLUSH) 0.9 %
10 SYRINGE (ML) INJECTION AS NEEDED
Status: DISCONTINUED | OUTPATIENT
Start: 2021-10-08 | End: 2021-10-11 | Stop reason: HOSPADM

## 2021-10-08 RX ADMIN — MORPHINE SULFATE 2 MG: 2 INJECTION, SOLUTION INTRAMUSCULAR; INTRAVENOUS at 23:39

## 2021-10-08 RX ADMIN — IOPAMIDOL 100 ML: 755 INJECTION, SOLUTION INTRAVENOUS at 21:59

## 2021-10-08 RX ADMIN — MORPHINE SULFATE 2 MG: 2 INJECTION, SOLUTION INTRAMUSCULAR; INTRAVENOUS at 21:43

## 2021-10-08 RX ADMIN — ONDANSETRON 4 MG: 2 INJECTION INTRAMUSCULAR; INTRAVENOUS at 21:42

## 2021-10-08 RX ADMIN — SODIUM CHLORIDE 1000 ML: 9 INJECTION, SOLUTION INTRAVENOUS at 21:41

## 2021-10-08 RX ADMIN — CEFTRIAXONE SODIUM 1 G: 1 INJECTION, SOLUTION INTRAVENOUS at 23:40

## 2021-10-09 LAB
ANION GAP SERPL CALCULATED.3IONS-SCNC: 9.7 MMOL/L (ref 5–15)
BASOPHILS # BLD AUTO: 0.02 10*3/MM3 (ref 0–0.2)
BASOPHILS NFR BLD AUTO: 0.3 % (ref 0–1.5)
BUN SERPL-MCNC: 8 MG/DL (ref 8–23)
BUN/CREAT SERPL: 11.9 (ref 7–25)
CALCIUM SPEC-SCNC: 8.8 MG/DL (ref 8.6–10.5)
CHLORIDE SERPL-SCNC: 102 MMOL/L (ref 98–107)
CO2 SERPL-SCNC: 27.3 MMOL/L (ref 22–29)
CREAT SERPL-MCNC: 0.67 MG/DL (ref 0.57–1)
DEPRECATED RDW RBC AUTO: 48.3 FL (ref 37–54)
EOSINOPHIL # BLD AUTO: 0.03 10*3/MM3 (ref 0–0.4)
EOSINOPHIL NFR BLD AUTO: 0.5 % (ref 0.3–6.2)
ERYTHROCYTE [DISTWIDTH] IN BLOOD BY AUTOMATED COUNT: 14.5 % (ref 12.3–15.4)
GFR SERPL CREATININE-BSD FRML MDRD: 102 ML/MIN/1.73
GLUCOSE BLDC GLUCOMTR-MCNC: 102 MG/DL (ref 70–99)
GLUCOSE BLDC GLUCOMTR-MCNC: 104 MG/DL (ref 70–99)
GLUCOSE BLDC GLUCOMTR-MCNC: 134 MG/DL (ref 70–99)
GLUCOSE SERPL-MCNC: 138 MG/DL (ref 65–99)
HCT VFR BLD AUTO: 38.2 % (ref 34–46.6)
HGB BLD-MCNC: 12.7 G/DL (ref 12–15.9)
IMM GRANULOCYTES # BLD AUTO: 0.03 10*3/MM3 (ref 0–0.05)
IMM GRANULOCYTES NFR BLD AUTO: 0.5 % (ref 0–0.5)
LYMPHOCYTES # BLD AUTO: 0.45 10*3/MM3 (ref 0.7–3.1)
LYMPHOCYTES NFR BLD AUTO: 7.2 % (ref 19.6–45.3)
MAGNESIUM SERPL-MCNC: 1.9 MG/DL (ref 1.6–2.4)
MCH RBC QN AUTO: 30.6 PG (ref 26.6–33)
MCHC RBC AUTO-ENTMCNC: 33.2 G/DL (ref 31.5–35.7)
MCV RBC AUTO: 92 FL (ref 79–97)
MONOCYTES # BLD AUTO: 0.79 10*3/MM3 (ref 0.1–0.9)
MONOCYTES NFR BLD AUTO: 12.7 % (ref 5–12)
NEUTROPHILS NFR BLD AUTO: 4.92 10*3/MM3 (ref 1.7–7)
NEUTROPHILS NFR BLD AUTO: 78.8 % (ref 42.7–76)
NRBC BLD AUTO-RTO: 0 /100 WBC (ref 0–0.2)
PLATELET # BLD AUTO: 249 10*3/MM3 (ref 140–450)
PMV BLD AUTO: 10.3 FL (ref 6–12)
POTASSIUM SERPL-SCNC: 4.2 MMOL/L (ref 3.5–5.2)
RBC # BLD AUTO: 4.15 10*6/MM3 (ref 3.77–5.28)
SODIUM SERPL-SCNC: 139 MMOL/L (ref 136–145)
WBC # BLD AUTO: 6.24 10*3/MM3 (ref 3.4–10.8)

## 2021-10-09 PROCEDURE — 94799 UNLISTED PULMONARY SVC/PX: CPT

## 2021-10-09 PROCEDURE — 25010000002 ONDANSETRON PER 1 MG: Performed by: PHYSICIAN ASSISTANT

## 2021-10-09 PROCEDURE — 25010000002 MORPHINE PER 10 MG: Performed by: PHYSICIAN ASSISTANT

## 2021-10-09 PROCEDURE — 99233 SBSQ HOSP IP/OBS HIGH 50: CPT | Performed by: INTERNAL MEDICINE

## 2021-10-09 PROCEDURE — 25010000002 ENOXAPARIN PER 10 MG: Performed by: INTERNAL MEDICINE

## 2021-10-09 PROCEDURE — 80048 BASIC METABOLIC PNL TOTAL CA: CPT | Performed by: PHYSICIAN ASSISTANT

## 2021-10-09 PROCEDURE — 25010000002 MORPHINE PER 10 MG

## 2021-10-09 PROCEDURE — 82962 GLUCOSE BLOOD TEST: CPT

## 2021-10-09 PROCEDURE — 83735 ASSAY OF MAGNESIUM: CPT | Performed by: PHYSICIAN ASSISTANT

## 2021-10-09 PROCEDURE — 85025 COMPLETE CBC W/AUTO DIFF WBC: CPT | Performed by: PHYSICIAN ASSISTANT

## 2021-10-09 PROCEDURE — 25010000002 CEFTRIAXONE PER 250 MG: Performed by: PHYSICIAN ASSISTANT

## 2021-10-09 PROCEDURE — 87086 URINE CULTURE/COLONY COUNT: CPT | Performed by: INTERNAL MEDICINE

## 2021-10-09 PROCEDURE — 99221 1ST HOSP IP/OBS SF/LOW 40: CPT | Performed by: SURGERY

## 2021-10-09 RX ORDER — SODIUM CHLORIDE, SODIUM LACTATE, POTASSIUM CHLORIDE, CALCIUM CHLORIDE 600; 310; 30; 20 MG/100ML; MG/100ML; MG/100ML; MG/100ML
100 INJECTION, SOLUTION INTRAVENOUS CONTINUOUS
Status: DISCONTINUED | OUTPATIENT
Start: 2021-10-09 | End: 2021-10-11

## 2021-10-09 RX ORDER — ALUMINA, MAGNESIA, AND SIMETHICONE 2400; 2400; 240 MG/30ML; MG/30ML; MG/30ML
15 SUSPENSION ORAL EVERY 6 HOURS PRN
Status: DISCONTINUED | OUTPATIENT
Start: 2021-10-09 | End: 2021-10-09

## 2021-10-09 RX ORDER — MORPHINE SULFATE 2 MG/ML
2 INJECTION, SOLUTION INTRAMUSCULAR; INTRAVENOUS EVERY 4 HOURS PRN
Status: DISCONTINUED | OUTPATIENT
Start: 2021-10-09 | End: 2021-10-11 | Stop reason: HOSPADM

## 2021-10-09 RX ORDER — SODIUM CHLORIDE 0.9 % (FLUSH) 0.9 %
10 SYRINGE (ML) INJECTION AS NEEDED
Status: DISCONTINUED | OUTPATIENT
Start: 2021-10-09 | End: 2021-10-11 | Stop reason: HOSPADM

## 2021-10-09 RX ORDER — AMLODIPINE BESYLATE 10 MG/1
10 TABLET ORAL DAILY
COMMUNITY
End: 2021-10-11 | Stop reason: HOSPADM

## 2021-10-09 RX ORDER — ALUMINA, MAGNESIA, AND SIMETHICONE 2400; 2400; 240 MG/30ML; MG/30ML; MG/30ML
15 SUSPENSION ORAL EVERY 6 HOURS PRN
Status: DISCONTINUED | OUTPATIENT
Start: 2021-10-09 | End: 2021-10-11 | Stop reason: HOSPADM

## 2021-10-09 RX ORDER — HYDROXYCHLOROQUINE SULFATE 200 MG/1
200 TABLET, FILM COATED ORAL 2 TIMES DAILY
Status: DISCONTINUED | OUTPATIENT
Start: 2021-10-09 | End: 2021-10-11 | Stop reason: HOSPADM

## 2021-10-09 RX ORDER — LOSARTAN POTASSIUM 50 MG/1
50 TABLET ORAL DAILY
Status: DISCONTINUED | OUTPATIENT
Start: 2021-10-09 | End: 2021-10-09

## 2021-10-09 RX ORDER — MORPHINE SULFATE 2 MG/ML
INJECTION, SOLUTION INTRAMUSCULAR; INTRAVENOUS
Status: COMPLETED
Start: 2021-10-09 | End: 2021-10-09

## 2021-10-09 RX ORDER — LOSARTAN POTASSIUM 50 MG/1
50 TABLET ORAL DAILY
Status: DISCONTINUED | OUTPATIENT
Start: 2021-10-09 | End: 2021-10-11 | Stop reason: HOSPADM

## 2021-10-09 RX ORDER — BUDESONIDE 0.5 MG/2ML
0.5 INHALANT ORAL
Status: DISCONTINUED | OUTPATIENT
Start: 2021-10-09 | End: 2021-10-11 | Stop reason: HOSPADM

## 2021-10-09 RX ORDER — SODIUM CHLORIDE 0.9 % (FLUSH) 0.9 %
10 SYRINGE (ML) INJECTION EVERY 12 HOURS SCHEDULED
Status: DISCONTINUED | OUTPATIENT
Start: 2021-10-09 | End: 2021-10-11 | Stop reason: HOSPADM

## 2021-10-09 RX ORDER — CEFTRIAXONE SODIUM 1 G/50ML
1 INJECTION, SOLUTION INTRAVENOUS DAILY
Status: DISCONTINUED | OUTPATIENT
Start: 2021-10-09 | End: 2021-10-11 | Stop reason: HOSPADM

## 2021-10-09 RX ORDER — FOLIC ACID 1 MG/1
1 TABLET ORAL DAILY
Status: DISCONTINUED | OUTPATIENT
Start: 2021-10-09 | End: 2021-10-11 | Stop reason: HOSPADM

## 2021-10-09 RX ORDER — ARFORMOTEROL TARTRATE 15 UG/2ML
15 SOLUTION RESPIRATORY (INHALATION)
Status: DISCONTINUED | OUTPATIENT
Start: 2021-10-09 | End: 2021-10-11 | Stop reason: HOSPADM

## 2021-10-09 RX ORDER — FOLIC ACID 1 MG/1
1 TABLET ORAL DAILY
Status: DISCONTINUED | OUTPATIENT
Start: 2021-10-09 | End: 2021-10-09

## 2021-10-09 RX ORDER — PROCHLORPERAZINE EDISYLATE 5 MG/ML
2.5 INJECTION INTRAMUSCULAR; INTRAVENOUS EVERY 4 HOURS PRN
Status: DISCONTINUED | OUTPATIENT
Start: 2021-10-09 | End: 2021-10-11 | Stop reason: HOSPADM

## 2021-10-09 RX ORDER — ONDANSETRON 2 MG/ML
4 INJECTION INTRAMUSCULAR; INTRAVENOUS EVERY 4 HOURS PRN
Status: DISCONTINUED | OUTPATIENT
Start: 2021-10-09 | End: 2021-10-11 | Stop reason: HOSPADM

## 2021-10-09 RX ORDER — HYDROXYCHLOROQUINE SULFATE 200 MG/1
200 TABLET, FILM COATED ORAL 2 TIMES DAILY
Status: DISCONTINUED | OUTPATIENT
Start: 2021-10-09 | End: 2021-10-09

## 2021-10-09 RX ADMIN — SODIUM CHLORIDE, POTASSIUM CHLORIDE, SODIUM LACTATE AND CALCIUM CHLORIDE 100 ML/HR: 600; 310; 30; 20 INJECTION, SOLUTION INTRAVENOUS at 11:20

## 2021-10-09 RX ADMIN — SODIUM CHLORIDE, PRESERVATIVE FREE 10 ML: 5 INJECTION INTRAVENOUS at 00:57

## 2021-10-09 RX ADMIN — HYDROXYCHLOROQUINE SULFATE 200 MG: 200 TABLET ORAL at 20:24

## 2021-10-09 RX ADMIN — CEFTRIAXONE SODIUM 1 G: 1 INJECTION, SOLUTION INTRAVENOUS at 09:34

## 2021-10-09 RX ADMIN — BUDESONIDE 0.5 MG: 0.5 SUSPENSION RESPIRATORY (INHALATION) at 20:00

## 2021-10-09 RX ADMIN — FOLIC ACID 1 MG: 1 TABLET ORAL at 09:34

## 2021-10-09 RX ADMIN — HYDROXYCHLOROQUINE SULFATE 200 MG: 200 TABLET ORAL at 09:53

## 2021-10-09 RX ADMIN — MORPHINE SULFATE: 2 INJECTION, SOLUTION INTRAMUSCULAR; INTRAVENOUS at 03:54

## 2021-10-09 RX ADMIN — SODIUM CHLORIDE, PRESERVATIVE FREE 10 ML: 5 INJECTION INTRAVENOUS at 20:25

## 2021-10-09 RX ADMIN — SODIUM CHLORIDE, POTASSIUM CHLORIDE, SODIUM LACTATE AND CALCIUM CHLORIDE 100 ML/HR: 600; 310; 30; 20 INJECTION, SOLUTION INTRAVENOUS at 00:57

## 2021-10-09 RX ADMIN — BENZOCAINE 2 SPRAY: 200 SPRAY DENTAL; ORAL; PERIODONTAL at 00:06

## 2021-10-09 RX ADMIN — BUDESONIDE 0.5 MG: 0.5 SUSPENSION RESPIRATORY (INHALATION) at 08:00

## 2021-10-09 RX ADMIN — ARFORMOTEROL TARTRATE 15 MCG: 15 SOLUTION RESPIRATORY (INHALATION) at 08:00

## 2021-10-09 RX ADMIN — MORPHINE SULFATE 2 MG: 2 INJECTION, SOLUTION INTRAMUSCULAR; INTRAVENOUS at 15:14

## 2021-10-09 RX ADMIN — ENOXAPARIN SODIUM 40 MG: 40 INJECTION SUBCUTANEOUS at 15:14

## 2021-10-09 RX ADMIN — ONDANSETRON 4 MG: 2 INJECTION INTRAMUSCULAR; INTRAVENOUS at 07:50

## 2021-10-09 RX ADMIN — MORPHINE SULFATE 2 MG: 2 INJECTION, SOLUTION INTRAMUSCULAR; INTRAVENOUS at 07:49

## 2021-10-09 RX ADMIN — SODIUM CHLORIDE, POTASSIUM CHLORIDE, SODIUM LACTATE AND CALCIUM CHLORIDE 100 ML/HR: 600; 310; 30; 20 INJECTION, SOLUTION INTRAVENOUS at 20:24

## 2021-10-09 RX ADMIN — ARFORMOTEROL TARTRATE 15 MCG: 15 SOLUTION RESPIRATORY (INHALATION) at 20:00

## 2021-10-09 RX ADMIN — SODIUM CHLORIDE, PRESERVATIVE FREE 10 ML: 5 INJECTION INTRAVENOUS at 09:43

## 2021-10-09 RX ADMIN — LOSARTAN POTASSIUM 50 MG: 50 TABLET, FILM COATED ORAL at 09:34

## 2021-10-09 NOTE — PROGRESS NOTES
[facility]   Hospitalist Progress Note  Date: 10/9/2021  Patient Name: Anntia Rodriguez  : 1935  MRN: [id number]  Date of admission: 10/8/2021  Consultants:   -General Surgery: Dr. Robbin Meyers    Subjective   Subjective     Chief Complaint: Abdominal pain    Summary:   Annita Rodriguez is a 86 y.o. female with past medical history significant for rheumatoid arthritis, type 2 diabetes mellitus, hypertension and from small bowel (presented to the ED with complaints of abdominal pain, nausea and vomiting.  Patient endorsed minimal stool output and having not passing flatus since morning of 10/07/2021.  Evaluation in the ED significant for urinalysis consistent with UTI and CT abdomen/pelvis showing mechanical small bowel structure with transition point involving the distal ileum in the right lower quadrant symmetry previous scans.  General surgery consulted by ED.  Hospitalist service contacted for further evaluation management.  NG tube placed.  Ceftriaxone started for UTI.    Interval Followup:   No acute events overnight.  Patient stated that her abdominal pain was much improved.  Continues with NG tube.  Patient has not had any bowel movements or passed any flatus at time of my exam/interview.  She denies any chest pain, abdominal pain, nausea or vomiting.  Nursing with no additional acute issues to report.    Antibiotics:   -Ceftriaxone    Pain Medication:   -IV Morphine    Review of Systems   10 point review of systems performed and negative unless stated otherwise under subjective.    Objective   Objective     Vitals:   Temp:  [98.2 °F (36.8 °C)-99.6 °F (37.6 °C)] 98.7 °F (37.1 °C)  Heart Rate:  [] 102  Resp:  [16-20] 20  BP: (143-174)/(57-85) 143/66  Physical Exam   Gen: No acute distress, Conversant, Pleasant, lying in bed  HEENT: MMM, Atraumatic, NG tube present  Neck: Supple, Trachea midline  Resp: CTAB, No w/r/r, No respiratory distress appreciated, equal chest rise bilaterally    Card: Regular rhythm, tachycardic, No m/r/g  Abd: Soft, Nontender, Nondistended, + bowel sounds  Ext: No cyanosis, No clubbing  Neuro: CN II-XII grossly intact, No focal deficits appreciated  Psych: AAO x 3, Normal mood, Normal affect    Result Review    Result Review:  I have personally reviewed the results from the time of this admission to 10/9/2021 14:24 EDT and agree with these findings:  [x]  Laboratory: Creatinine electrolytes stable and within normal limits.  WBC and hemoglobin and platelets stable.  []  Microbiology:   []  Radiology:   []  EKG/Telemetry:    []  Cardiology/Vascular:    []  Pathology:  []  Old records:  []  Other:    Assessment/Plan   Assessment / Plan     Assessment:  Small bowel obstruction  Urinary tract infection, unknown organism  Essential hypertension  Rheumatoid arthritis  COPD, not acute exacerbation  Type 2 diabetes mellitus    Plan:  -General Surgery consulted and following, appreciate assistance and recommendations in the care of this patient.  -Continue NG tube per general surgery  -Patient to remain n.p.o.  -Continue ceftriaxone for UTI, urine culture ordered.  Follow-up culture results and tailor antibiotic therapy accordingly  -Continue gentle IV fluids  -Continue pain control with as needed IV morphine  -Continue as needed Zofran  -Will monitor electrolytes and renal function with BMP and magnesium level in the AM  -Will monitor WBC and Hgb with CBC in the AM  -Clinical course will dictate further management     DVT Prophylaxis: Lovenox  Diet: NPO  Dispo: Home when medically appropriate for discharge  Code Status: Full code     Personally reviewed patients labs and imaging, discussed with patient and nurse at bedside. Discussed case with the following consultants: General Surgery.     Part of this note may be an electronic transcription/translation of spoken language to printed text using the Dragon dictation system.    DVT prophylaxis:  Mechanical DVT prophylaxis orders  are present.    CODE STATUS:   Code Status: CPR  Medical Interventions (Level of Support Prior to Arrest): Full        Electronically signed by Arias Bradley MD, 10/09/21, 2:24 PM EDT.

## 2021-10-09 NOTE — ED PROVIDER NOTES
"Time: 9:05 PM EDT  Arrived by: private car  History provided by: pt and daughter  History is limited by: N/A     History of Present Illness:  Patient is a 86 y.o. year old female that presents to the emergency department with ABDOMINAL PAIN.    Pt states she has had some abdominal pain that started yesterday night that has worsened tonight. Pt complains of vomiting, appetite change, and nausea, but denies any dysuria.   Pt rates her pain an 8/10.  Pt has had previous episodes of SBO.      History provided by:  Patient and relative   used: No    Abdominal Pain  Pain location:  Generalized  Pain quality: cramping    Pain quality comment:  \"pain\"  Pain radiates to:  Does not radiate  Pain severity:  Severe  Onset quality:  Gradual  Duration:  2 days  Timing:  Constant  Progression:  Worsening  Chronicity:  Recurrent  Relieved by:  Nothing  Worsened by:  Nothing  Ineffective treatments:  Acetaminophen  Associated symptoms: nausea and vomiting    Associated symptoms: no chills, no diarrhea, no dysuria, no fever, no hematuria, no shortness of breath and no sore throat        Similar Symptoms Previously: none  Recently seen: recently seen in this ED (8/19/2021)    Patient Care Team  Primary Care Provider: Arias Beck MD    Past Medical History:     Allergies   Allergen Reactions   • Sulfa Antibiotics Nausea And Vomiting     Past Medical History:   Diagnosis Date   • Arthritis    • Bowel obstruction (HCC)    • Diabetes mellitus (HCC)    • Hypertension    • Tinnitus      Past Surgical History:   Procedure Laterality Date   • HYSTERECTOMY     • OOPHORECTOMY Bilateral    • VEIN LIGATION AND STRIPPING       Family History   Problem Relation Age of Onset   • Stroke Father    • Heart disease Father    • Diabetes Father        Home Medications:  Prior to Admission medications    Medication Sig Start Date End Date Taking? Authorizing Provider   BD Pen Needle Neli 2nd Gen 32G X 4 MM misc USE FOR INSULIN " INJECTIONS DAILY 7/28/21   Beverly Lynn MD   Brovana 15 MCG/2ML nebulizer solution Take 15 mcg by nebulization 2 (Two) Times a Day. 7/12/21   Beverly Lynn MD   budesonide (PULMICORT) 0.5 MG/2ML nebulizer solution Take 0.5 mg by nebulization 2 (two) times a day. 7/12/21   Beverly Lynn MD   folic acid (FOLVITE) 1 MG tablet Take 1 mg by mouth Daily. 7/12/21   Beverly Lynn MD   hydroxychloroquine (PLAQUENIL) 200 MG tablet Take 200 mg by mouth 2 (Two) Times a Day. 7/12/21   Beverly Lynn MD   Levemir FlexTouch 100 UNIT/ML injection ADMINISTER 10 UNITS UNDER THE SKIN AT BEDTIME 7/22/21   Beverly Lynn MD   losartan (COZAAR) 50 MG tablet Take 1 tablet by mouth Daily. 8/23/21   Josef Olmedo MD   metFORMIN (GLUCOPHAGE) 500 MG tablet Take 500 mg by mouth 2 (Two) Times a Day With Meals. 5/13/21   Beverly Lynn MD   methotrexate 2.5 MG tablet Take 25 mg by mouth 1 (One) Time Per Week.    Beverly Lynn MD   metoprolol tartrate (LOPRESSOR) 25 MG tablet Take 0.5 tablets by mouth 2 (Two) Times a Day for 30 days. 8/23/21 9/22/21  Josef Olmedo MD   Pramipexole Dihydrochloride (MIRAPEX PO) Take  by mouth 1 (One) Time Per Week.    Beverly Lynn MD   Spiriva Respimat 2.5 MCG/ACT aerosol solution inhaler Inhale 2 puffs Daily. 5/13/21   Beverly Lynn MD        Social History:   Social History     Tobacco Use   • Smoking status: Former Smoker     Packs/day: 0.25     Years: 15.00     Pack years: 3.75     Types: Cigarettes   • Smokeless tobacco: Never Used   Vaping Use   • Vaping Use: Never used   Substance Use Topics   • Alcohol use: Never   • Drug use: Never     Recent travel: not applicable     Review of Systems:  Review of Systems   Constitutional: Positive for appetite change. Negative for chills and fever.   HENT: Negative for congestion, ear pain, rhinorrhea, sore throat, tinnitus and trouble swallowing.    Eyes: Negative for photophobia  "and pain.   Respiratory: Negative for choking and shortness of breath.    Gastrointestinal: Positive for abdominal pain, nausea and vomiting. Negative for diarrhea.   Endocrine: Negative for polydipsia.   Genitourinary: Negative for difficulty urinating, dysuria and hematuria.   Musculoskeletal: Negative for back pain, joint swelling and neck pain.   Skin: Negative for rash.   Neurological: Negative for dizziness, seizures, speech difficulty, weakness, light-headedness, numbness and headaches.   Hematological: Negative for adenopathy.   Psychiatric/Behavioral: Negative for behavioral problems, confusion, self-injury and suicidal ideas.        Physical Exam:  /76 (BP Location: Right arm, Patient Position: Lying)   Pulse 103   Temp 98.2 °F (36.8 °C) (Oral)   Resp 16   Ht 160 cm (63\")   Wt 54.3 kg (119 lb 11.4 oz)   SpO2 98%   BMI 21.21 kg/m²     Physical Exam  Vitals and nursing note reviewed.   Constitutional:       General: She is awake.      Comments: Pt appears frail.    HENT:      Head: Normocephalic and atraumatic.      Right Ear: External ear normal.      Left Ear: External ear normal.      Nose: Nose normal.      Mouth/Throat:      Mouth: Mucous membranes are moist.      Pharynx: Oropharynx is clear.   Eyes:      General: Lids are normal.      Extraocular Movements: Extraocular movements intact.      Conjunctiva/sclera: Conjunctivae normal.      Pupils: Pupils are equal, round, and reactive to light.   Cardiovascular:      Rate and Rhythm: Normal rate and regular rhythm.      Pulses: Normal pulses.      Heart sounds: Normal heart sounds.   Pulmonary:      Effort: Pulmonary effort is normal.      Breath sounds: Normal breath sounds and air entry.   Abdominal:      General: A surgical scar is present.      Palpations: Abdomen is soft.      Tenderness: There is abdominal tenderness in the right upper quadrant and right lower quadrant.      Comments: There are multiple surgical scars over the abdomen. "    Musculoskeletal:         General: Normal range of motion.      Cervical back: Full passive range of motion without pain, normal range of motion and neck supple.   Skin:     General: Skin is warm and dry.   Neurological:      General: No focal deficit present.      Mental Status: She is alert and oriented to person, place, and time. Mental status is at baseline.      Cranial Nerves: Cranial nerves are intact.      Sensory: Sensation is intact.      Motor: Motor function is intact.      Coordination: Coordination is intact.   Psychiatric:         Attention and Perception: Attention and perception normal.         Mood and Affect: Mood and affect normal.         Speech: Speech normal.         Behavior: Behavior normal. Behavior is cooperative.         Thought Content: Thought content normal.         Cognition and Memory: Cognition and memory normal.                Medications in the Emergency Department:  Medications   sodium chloride 0.9 % flush 10 mL (has no administration in time range)   Benzocaine 20 % aerosol 2 spray (has no administration in time range)   morphine injection 2 mg (has no administration in time range)   cefTRIAXone (ROCEPHIN) IVPB 1 g (has no administration in time range)   sodium chloride 0.9 % bolus 1,000 mL (0 mL Intravenous Stopped 10/8/21 2255)   morphine injection 2 mg (2 mg Intravenous Given 10/8/21 2143)   ondansetron (ZOFRAN) injection 4 mg (4 mg Intravenous Given 10/8/21 2142)   iopamidol (ISOVUE-370) 76 % injection 100 mL (100 mL Intravenous Given 10/8/21 2159)        Labs  Lab Results (last 24 hours)     Procedure Component Value Units Date/Time    CBC & Differential [[id number]]  (Abnormal) Collected: 10/08/21 2054    Specimen: Blood Updated: 10/08/21 2102    Narrative:      The following orders were created for panel order CBC & Differential.  Procedure                               Abnormality         Status                     ---------                               -----------          ------                     CBC Auto Differential[[id number]]        Abnormal            Final result                 Please view results for these tests on the individual orders.    Comprehensive Metabolic Panel [[id number]]  (Abnormal) Collected: 10/08/21 2054    Specimen: Blood Updated: 10/08/21 2138     Glucose 156 mg/dL      BUN 11 mg/dL      Creatinine 0.63 mg/dL      Sodium 136 mmol/L      Potassium 3.7 mmol/L      Chloride 100 mmol/L      CO2 21.5 mmol/L      Calcium 9.3 mg/dL      Total Protein 7.1 g/dL      Albumin 4.40 g/dL      ALT (SGPT) 11 U/L      AST (SGOT) 14 U/L      Alkaline Phosphatase 80 U/L      Total Bilirubin 0.6 mg/dL      eGFR  African Amer 109 mL/min/1.73      Globulin 2.7 gm/dL      A/G Ratio 1.6 g/dL      BUN/Creatinine Ratio 17.5     Anion Gap 14.5 mmol/L     Narrative:      GFR Normal >60  Chronic Kidney Disease <60  Kidney Failure <15      Lipase [[id number]]  (Normal) Collected: 10/08/21 2054    Specimen: Blood Updated: 10/08/21 2138     Lipase 20 U/L     CBC Auto Differential [[id number]]  (Abnormal) Collected: 10/08/21 2054    Specimen: Blood Updated: 10/08/21 2102     WBC 6.91 10*3/mm3      RBC 4.26 10*6/mm3      Hemoglobin 13.1 g/dL      Hematocrit 39.1 %      MCV 91.8 fL      MCH 30.8 pg      MCHC 33.5 g/dL      RDW 14.5 %      RDW-SD 48.1 fl      MPV 9.9 fL      Platelets 276 10*3/mm3      Neutrophil % 76.6 %      Lymphocyte % 14.9 %      Monocyte % 7.4 %      Eosinophil % 0.4 %      Basophil % 0.4 %      Immature Grans % 0.3 %      Neutrophils, Absolute 5.29 10*3/mm3      Lymphocytes, Absolute 1.03 10*3/mm3      Monocytes, Absolute 0.51 10*3/mm3      Eosinophils, Absolute 0.03 10*3/mm3      Basophils, Absolute 0.03 10*3/mm3      Immature Grans, Absolute 0.02 10*3/mm3      nRBC 0.0 /100 WBC     Lactic Acid, Plasma [[id number]]  (Normal) Collected: 10/08/21 2139    Specimen: Blood Updated: 10/08/21 2202     Lactate 1.2 mmol/L     Urinalysis With Microscopic If Indicated (No  Culture) - Urine, Clean Catch [[id number]]  (Abnormal) Collected: 10/08/21 2238    Specimen: Urine, Clean Catch Updated: 10/08/21 2257     Color, UA Yellow     Appearance, UA Clear     pH, UA 6.5     Specific Gravity, UA 1.021     Glucose, UA Negative     Ketones, UA Trace     Bilirubin, UA Negative     Blood, UA Negative     Protein, UA Negative     Leuk Esterase, UA Small (1+)     Nitrite, UA Positive     Urobilinogen, UA 0.2 E.U./dL    Urinalysis, Microscopic Only - Urine, Clean Catch [[id number]]  (Abnormal) Collected: 10/08/21 2238    Specimen: Urine, Clean Catch Updated: 10/08/21 2259     RBC, UA 3-5 /HPF      WBC, UA 21-30 /HPF      Bacteria, UA 4+ /HPF      Squamous Epithelial Cells, UA 0-2 /HPF      Hyaline Casts, UA 0-2 /LPF      Methodology Automated Microscopy           Imaging:  CT Abdomen Pelvis With Contrast    Result Date: 10/8/2021  PROCEDURE: CT ABDOMEN PELVIS W CONTRAST  COMPARISONS: [facility], CT, CT ABDOMEN PELVIS W CONTRAST, 8/20/2021, 0:19.   [facility], CT, ABDOMEN/PELVIS WITH CONTRAST, 1/30/2020, 11:35.  INDICATIONS: Unspecified abdominal pain, beginning yesterday; the pain has worsened tonight; vomiting, appetite change, and nausea; prior history of small bowel obstruction.  TECHNIQUE: After obtaining the patient's consent, 563 CT images were created with non-ionic intravenous contrast material.  No oral contrast agent was administered for the study  PROTOCOL:   Standard imaging protocol performed    RADIATION:   DLP: 491.8mGy*cm   Automated exposure control was utilized to minimize radiation dose. CONTRAST: 100cc Isovue 370 I.V. LABS:   eGFR: >60ml/min/1.73m2  FINDINGS: A mechanical small bowel obstruction is suspected with a transition point in the right lower quadrant involving distal ileum, similar to the prior 2 abdominal/pelvic CT studies.  The obstruction may be partial or complete.  An adhesion would be in the differential diagnosis for the cause of the  small bowel obstruction.  No pneumoperitoneum or pneumatosis.  No portal or mesenteric venous gas is identified.  There is an incidental 2 cm left adrenal nodule, which has not significantly changed.  It is probably benign.  No definite right adrenal nodule.  Otherwise, no acute findings are seen and no significant interval change is seen since prior CT exams.  CONCLUSION: A mechanical small bowel obstruction is suspected with a transition point involving distal ileum within the right lower quadrant, similar to that seen on the prior 2 CT exams from 8/20/2021 and 1/30/2020.  No pneumoperitoneum or pneumatosis is seen.  No definite focal extraluminal intraperitoneal fluid collection is seen to suggest abscess.  Please see above comments for further detail.      ИВАН KENT JR, MD       Electronically Signed and Approved By: ИВАН KENT JR, MD on 10/08/2021 at 22:14               Procedures:  Procedures    Progress                            Medical Decision Making:  MDM     CT demonstrates right lower quadrant transition point with small bowel obstruction of distal ileum.  Patient's pain controlled in the emergency department.  She has had no episodes of vomiting here.  Patient discussed with Dr. Meyers of general surgery.  Recommends NG placement.  She discussed with hospitalist for admission.  Additionally found to have UTI treated with ceftriaxone.  The patient's airway is intact, vital signs, and respiratory status are safe for admission at this time.      Final diagnoses:   Small bowel obstruction (HCC)   Acute UTI        Disposition:  ED Disposition     ED Disposition Condition Comment    Decision to Admit            Documentation assistance provided by Teresa Casiano acting as scribe for Jorge Ramirez MD. Information recorded by the scribe was done at my direction and has been verified and validated by me.          Teresa Casiano  10/08/21 1798       Jorge Ramirez MD  10/09/21 [id number]

## 2021-10-09 NOTE — PLAN OF CARE
Goal Outcome Evaluation:           Progress: improving  Outcome Summary: Pt has had minimal output from NG tube today; not yet passing flatus, tolerating ambulating in hallway well; managing pain with IV pain medication.

## 2021-10-09 NOTE — CONSULTS
General Surgery/Colorectal Surgery Note    Patient Name:  Annita Rodriguez  YOB: 1935  [id number]    Referring Provider: No ref. provider found      Patient Care Team:  Arias Beck MD as PCP - General (Internal Medicine)    Chief complaint abdominal pain    Subjective .     History of present illness:    History of abdominal pain with history of 2 previous bowel obstructions managed nonoperatively with NG tube decompression.  Previous open cholecystectomy and total abdominal hysterectomy with oophorectomy.  Started having abdominal pain yesterday similar to previous bowel obstructions with nausea and vomiting.  No fever.  No recent chest pain.  No tobacco use.  No blood thinner use.  Last flatus yesterday.    Work-up with WBC 6, hemoglobin 13, lactic acid 1.2, creatinine 0.6  CT abdomen and pelvis with images personally reviewed with dilated small bowel with transition point right lower quadrant  Repeat labs this morning with WBC 6  Afebrile, heart rate in the 90s  Admitted.  NG tube placed.  Still with some intermittent mild lower abdominal pain although improved since admission.      History:  Past Medical History:   Diagnosis Date   • Arthritis    • Bowel obstruction (HCC)    • Diabetes mellitus (HCC)    • Hypertension    • Tinnitus        Past Surgical History:   Procedure Laterality Date   • HYSTERECTOMY     • OOPHORECTOMY Bilateral    • VEIN LIGATION AND STRIPPING         Family History   Problem Relation Age of Onset   • Stroke Father    • Heart disease Father    • Diabetes Father        Social History     Tobacco Use   • Smoking status: Former Smoker     Packs/day: 0.25     Years: 15.00     Pack years: 3.75     Types: Cigarettes   • Smokeless tobacco: Never Used   Vaping Use   • Vaping Use: Never used   Substance Use Topics   • Alcohol use: Never   • Drug use: Never       Review of Systems  All systems were reviewed and negative except for:   Review of Systems   Constitutional:  Negative for chills, fever and unexpected weight loss.   HENT: Negative for congestion, nosebleeds and voice change.    Eyes: Negative for blurred vision, double vision and discharge.   Respiratory: Negative for apnea, chest tightness and shortness of breath.    Cardiovascular: Negative for chest pain and leg swelling.   Gastrointestinal:        See HPI   Endocrine: Negative for cold intolerance and heat intolerance.   Genitourinary: Negative for dysuria, hematuria and urgency.   Musculoskeletal: Negative for back pain, joint swelling and neck pain.   Skin: Negative for color change and dry skin.   Neurological: Negative for dizziness and confusion.   Hematological: Negative for adenopathy.   Psychiatric/Behavioral: Negative for agitation and behavioral problems.     MEDS:  Prior to Admission medications    Medication Sig Start Date End Date Taking? Authorizing Provider   BD Pen Needle Neli 2nd Gen 32G X 4 MM misc USE FOR INSULIN INJECTIONS DAILY 7/28/21   Beverly Lynn MD Brovana 15 MCG/2ML nebulizer solution Take 15 mcg by nebulization 2 (Two) Times a Day. 7/12/21   Beverly Lynn MD   budesonide (PULMICORT) 0.5 MG/2ML nebulizer solution Take 0.5 mg by nebulization 2 (two) times a day. 7/12/21   Beverly Lynn MD   folic acid (FOLVITE) 1 MG tablet Take 1 mg by mouth Daily. 7/12/21   Beverly Lynn MD   hydroxychloroquine (PLAQUENIL) 200 MG tablet Take 200 mg by mouth 2 (Two) Times a Day. 7/12/21   Beverly Lynn MD   Levemir FlexTouch 100 UNIT/ML injection ADMINISTER 10 UNITS UNDER THE SKIN AT BEDTIME 7/22/21   Beverly Lynn MD   losartan (COZAAR) 50 MG tablet Take 1 tablet by mouth Daily. 8/23/21   Josef Olmedo MD   metFORMIN (GLUCOPHAGE) 500 MG tablet Take 500 mg by mouth 2 (Two) Times a Day With Meals. 5/13/21   Beverly Lynn MD   methotrexate 2.5 MG tablet Take 25 mg by mouth 1 (One) Time Per Week.    Beverly Lynn MD   metoprolol tartrate  (LOPRESSOR) 25 MG tablet Take 0.5 tablets by mouth 2 (Two) Times a Day for 30 days. 8/23/21 9/22/21  Josef Olmedo MD   Pramipexole Dihydrochloride (MIRAPEX PO) Take  by mouth 1 (One) Time Per Week.    Provider, MD Beverly   Spiriva Respimat 2.5 MCG/ACT aerosol solution inhaler Inhale 2 puffs Daily. 5/13/21   Provider, MD Beverly        arformoterol, 15 mcg, Nebulization, BID - RT  budesonide, 0.5 mg, Nebulization, BID - RT  cefTRIAXone, 1 g, Intravenous, Daily  folic acid, 1 mg, Oral, Daily  hydroxychloroquine, 200 mg, Oral, BID  losartan, 50 mg, Oral, Daily  sodium chloride, 10 mL, Intravenous, Q12H         lactated ringers, 100 mL/hr, Last Rate: 100 mL/hr (10/09/21 0057)         Allergies:  Sulfa antibiotics    Objective     Vital Signs   Temp:  [98.2 °F (36.8 °C)-99.6 °F (37.6 °C)] 98.4 °F (36.9 °C)  Heart Rate:  [] 94  Resp:  [16-20] 18  BP: (156-174)/(57-85) 171/72    Physical Exam:     General Appearance:    Alert, cooperative, in no acute distress   Head:    Normocephalic, without obvious abnormality, atraumatic   Eyes:          Conjunctivae and sclerae normal, no icterus,     Ears:    Ears appear intact with no abnormalities noted   Throat:   No oral lesions, no thrush, oral mucosa moist   Neck:   No adenopathy, supple, trachea midline, no thyromegaly   Back:     No kyphosis present, no scoliosis present, no skin lesions,      erythema or scars, no tenderness to percussion or                   palpation,   range of motion normal   Lungs:     Clear to auscultation,respirations regular, even and                  unlabored    Heart:    Regular rhythm and normal rate, normal S1 and S2, no            murmur, no gallop, no rub, no click   Chest Wall:    No abnormalities observed   Abdomen:     Normal bowel sounds, no masses, no organomegaly, soft        minimal tenderness right lower quadrant, non-distended, no guarding, no rebound                tenderness   Rectal:        Extremities:   Moves  all extremities well, no edema, no cyanosis, no             redness   Pulses:   Pulses palpable and equal bilaterally   Skin:   No bleeding, bruising or rash   Lymph nodes:   No palpable adenopathy   Neurologic:   A/o x 4 with no deficits       Results Review: I have reviewed the patient's labs and imaging    LABS/IMAGING:    Imaging Results (Last 72 Hours)     Procedure Component Value Units Date/Time    CT Abdomen Pelvis With Contrast [[id number]] Collected: 10/08/21 2214     Updated: 10/08/21 2218    Narrative:      PROCEDURE: CT ABDOMEN PELVIS W CONTRAST     COMPARISONS: [facility], CT, CT ABDOMEN PELVIS W CONTRAST, 8/20/2021, 0:19.     [facility], CT, ABDOMEN/PELVIS WITH CONTRAST, 1/30/2020, 11:35.     INDICATIONS: Unspecified abdominal pain, beginning yesterday; the pain has worsened tonight;   vomiting, appetite change, and nausea; prior history of small bowel obstruction.     TECHNIQUE: After obtaining the patient's consent, 563 CT images were created with non-ionic   intravenous contrast material.  No oral contrast agent was administered for the study     PROTOCOL:   Standard imaging protocol performed      RADIATION:   DLP: 491.8mGy*cm    Automated exposure control was utilized to minimize radiation dose.   CONTRAST: 100cc Isovue 370 I.V.  LABS:   eGFR: >60ml/min/1.73m2     FINDINGS: A mechanical small bowel obstruction is suspected with a transition point in the right   lower quadrant involving distal ileum, similar to the prior 2 abdominal/pelvic CT studies.  The   obstruction may be partial or complete.  An adhesion would be in the differential diagnosis for the   cause of the small bowel obstruction.  No pneumoperitoneum or pneumatosis.  No portal or mesenteric   venous gas is identified.  There is an incidental 2 cm left adrenal nodule, which has not   significantly changed.  It is probably benign.  No definite right adrenal nodule.  Otherwise, no   acute findings are seen  and no significant interval change is seen since prior CT exams.     CONCLUSION: A mechanical small bowel obstruction is suspected with a transition point involving   distal ileum within the right lower quadrant, similar to that seen on the prior 2 CT exams from   8/20/2021 and 1/30/2020.  No pneumoperitoneum or pneumatosis is seen.  No definite focal   extraluminal intraperitoneal fluid collection is seen to suggest abscess.  Please see above   comments for further detail.                ИВАН KENT JR, MD         Electronically Signed and Approved By: ИВАН KENT JR, MD on 10/08/2021 at 22:14                            Lab Results (last 72 hours)     Procedure Component Value Units Date/Time    POC Glucose Once [[id number]]  (Abnormal) Collected: 10/09/21 0730    Specimen: Blood Updated: 10/09/21 0739     Glucose 134 mg/dL      Comment: Serial Number: [id number]:  [id number]       Basic Metabolic Panel [[id number]]  (Abnormal) Collected: 10/09/21 0558    Specimen: Blood Updated: 10/09/21 0705     Glucose 138 mg/dL      BUN 8 mg/dL      Creatinine 0.67 mg/dL      Sodium 139 mmol/L      Potassium 4.2 mmol/L      Chloride 102 mmol/L      CO2 27.3 mmol/L      Calcium 8.8 mg/dL      eGFR  African Amer 102 mL/min/1.73      BUN/Creatinine Ratio 11.9     Anion Gap 9.7 mmol/L     Narrative:      GFR Normal >60  Chronic Kidney Disease <60  Kidney Failure <15      Magnesium [[id number]]  (Normal) Collected: 10/09/21 0558    Specimen: Blood Updated: 10/09/21 0705     Magnesium 1.9 mg/dL     CBC Auto Differential [[id number]]  (Abnormal) Collected: 10/09/21 0558    Specimen: Blood Updated: 10/09/21 0630     WBC 6.24 10*3/mm3      RBC 4.15 10*6/mm3      Hemoglobin 12.7 g/dL      Hematocrit 38.2 %      MCV 92.0 fL      MCH 30.6 pg      MCHC 33.2 g/dL      RDW 14.5 %      RDW-SD 48.3 fl      MPV 10.3 fL      Platelets 249 10*3/mm3      Neutrophil % 78.8 %      Lymphocyte % 7.2 %      Monocyte % 12.7 %      Eosinophil %  0.5 %      Basophil % 0.3 %      Immature Grans % 0.5 %      Neutrophils, Absolute 4.92 10*3/mm3      Lymphocytes, Absolute 0.45 10*3/mm3      Monocytes, Absolute 0.79 10*3/mm3      Eosinophils, Absolute 0.03 10*3/mm3      Basophils, Absolute 0.02 10*3/mm3      Immature Grans, Absolute 0.03 10*3/mm3      nRBC 0.0 /100 WBC     Urinalysis, Microscopic Only - Urine, Clean Catch [[id number]]  (Abnormal) Collected: 10/08/21 2238    Specimen: Urine, Clean Catch Updated: 10/08/21 2259     RBC, UA 3-5 /HPF      WBC, UA 21-30 /HPF      Bacteria, UA 4+ /HPF      Squamous Epithelial Cells, UA 0-2 /HPF      Hyaline Casts, UA 0-2 /LPF      Methodology Automated Microscopy    Urinalysis With Microscopic If Indicated (No Culture) - Urine, Clean Catch [[id number]]  (Abnormal) Collected: 10/08/21 2238    Specimen: Urine, Clean Catch Updated: 10/08/21 2257     Color, UA Yellow     Appearance, UA Clear     pH, UA 6.5     Specific Gravity, UA 1.021     Glucose, UA Negative     Ketones, UA Trace     Bilirubin, UA Negative     Blood, UA Negative     Protein, UA Negative     Leuk Esterase, UA Small (1+)     Nitrite, UA Positive     Urobilinogen, UA 0.2 E.U./dL    Lactic Acid, Plasma [[id number]]  (Normal) Collected: 10/08/21 2139    Specimen: Blood Updated: 10/08/21 2202     Lactate 1.2 mmol/L     [place] Draw [[id number]] Collected: 10/08/21 2054    Specimen: Blood Updated: 10/08/21 2200    Narrative:      The following orders were created for panel order [place] [facility].  Procedure                               Abnormality         Status                     ---------                               -----------         ------                     Green Top (Gel)[[id number]]                                  Final result               Lavender Top[[id number]]                                     Final result               Gold Top - SST[[id number]]                                   Final result               Light Blue Top[[id number]]                                    Final result                 Please view results for these tests on the individual orders.    Lavender Top [[id number]] Collected: 10/08/21 2054    Specimen: Blood Updated: 10/08/21 2200     Extra Tube hold for add-on     Comment: Auto resulted       Gold Top - SST [[id number]] Collected: 10/08/21 2054    Specimen: Blood Updated: 10/08/21 2200     Extra Tube Hold for add-ons.     Comment: Auto resulted.       Green Top (Gel) [[id number]] Collected: 10/08/21 2054    Specimen: Blood Updated: 10/08/21 2200     Extra Tube Hold for add-ons.     Comment: Auto resulted.       Light Blue Top [[id number]] Collected: 10/08/21 2054    Specimen: Blood Updated: 10/08/21 2200     Extra Tube hold for add-on     Comment: Auto resulted       Comprehensive Metabolic Panel [[id number]]  (Abnormal) Collected: 10/08/21 2054    Specimen: Blood Updated: 10/08/21 2138     Glucose 156 mg/dL      BUN 11 mg/dL      Creatinine 0.63 mg/dL      Sodium 136 mmol/L      Potassium 3.7 mmol/L      Chloride 100 mmol/L      CO2 21.5 mmol/L      Calcium 9.3 mg/dL      Total Protein 7.1 g/dL      Albumin 4.40 g/dL      ALT (SGPT) 11 U/L      AST (SGOT) 14 U/L      Alkaline Phosphatase 80 U/L      Total Bilirubin 0.6 mg/dL      eGFR  African Amer 109 mL/min/1.73      Globulin 2.7 gm/dL      A/G Ratio 1.6 g/dL      BUN/Creatinine Ratio 17.5     Anion Gap 14.5 mmol/L     Narrative:      GFR Normal >60  Chronic Kidney Disease <60  Kidney Failure <15      Lipase [[id number]]  (Normal) Collected: 10/08/21 2054    Specimen: Blood Updated: 10/08/21 2138     Lipase 20 U/L     CBC & Differential [[id number]]  (Abnormal) Collected: 10/08/21 2054    Specimen: Blood Updated: 10/08/21 2102    Narrative:      The following orders were created for panel order CBC & Differential.  Procedure                               Abnormality         Status                     ---------                               -----------         ------                     CBC Auto  Differential[[id number]]        Abnormal            Final result                 Please view results for these tests on the individual orders.    CBC Auto Differential [[id number]]  (Abnormal) Collected: 10/08/21 2054    Specimen: Blood Updated: 10/08/21 2102     WBC 6.91 10*3/mm3      RBC 4.26 10*6/mm3      Hemoglobin 13.1 g/dL      Hematocrit 39.1 %      MCV 91.8 fL      MCH 30.8 pg      MCHC 33.5 g/dL      RDW 14.5 %      RDW-SD 48.1 fl      MPV 9.9 fL      Platelets 276 10*3/mm3      Neutrophil % 76.6 %      Lymphocyte % 14.9 %      Monocyte % 7.4 %      Eosinophil % 0.4 %      Basophil % 0.4 %      Immature Grans % 0.3 %      Neutrophils, Absolute 5.29 10*3/mm3      Lymphocytes, Absolute 1.03 10*3/mm3      Monocytes, Absolute 0.51 10*3/mm3      Eosinophils, Absolute 0.03 10*3/mm3      Basophils, Absolute 0.03 10*3/mm3      Immature Grans, Absolute 0.02 10*3/mm3      nRBC 0.0 /100 WBC              Result Review :     Assessment/Plan     Small bowel obstruction (HCC)  Partial small bowel obstruction versus small bowel obstruction    I have reviewed the patient's work-up with results mentioned above.  I reviewed the findings with the patient and family.  No surgery at this time.  N.p.o. for now.  NG tube to low intermittent wall suction.  Ambulate.  Patient was instructed to call me if she passes flatus and we will remove the NG tube and start clear liquids.  If her pain worsens she may need surgery but hopefully we can get her through this without a surgery.  If she fails to progress over the next day or 2 then we will likely obtain small bowel follow-through.  All questions answered.  They agree with the plan.          Robbin Meyers MD  10/09/21 07:44 EDT

## 2021-10-09 NOTE — PLAN OF CARE
Goal Outcome Evaluation:  Plan of Care Reviewed With: patient        Progress: improving  Outcome Summary: VSS; NG to LWS w/o complications and minimal output; no c/o nausea since arrival to floor from ED; pain controlled w/ IV pain med Q4H.

## 2021-10-09 NOTE — H&P
[facility]   HOSPITALIST HISTORY AND PHYSICAL  Date: 10/8/2021   Patient Name: Annita Rodriguez  : 1935  MRN: [id number]  Primary Care Physician:  Arias Beck MD  Date of admission: 10/8/2021    Subjective   Subjective     Chief Complaint: Abdominal pain    HPI:    Annita Rodriguez is a 86 y.o. female with history of rheumatoid arthritis, diabetes mellitus, hypertension, and frequent small bowel obstructions who presented to emergency department today due to abdominal pain.  Patient has a frequent history of small bowel obstructions and states that this felt similar.  She had 3 episodes of vomiting between last night and today.  States that she has had minimal stool output and has not passed any flatus since yesterday morning.  No fevers or chills.  No chest pain or shortness of breath.    In the emergency department, patient's vitals were stable.  Laboratory evaluation overall unremarkable.  UA concerning for infection.  CT abdomen and pelvis revealed mechanical small bowel obstruction with transition point involving the distal ileum in the right lower quadrant similar to those previous.  General surgery was consulted by the ED and agreed to see the patient.  Recommended NG tube.  Because of the above, the hospitalist team was contacted to admit for further management.      Personal History     Past Medical History:  Past Medical History:   Diagnosis Date   • Arthritis    • Bowel obstruction (HCC)    • Diabetes mellitus (HCC)    • Hypertension    • Tinnitus        Past Surgical History:  Past Surgical History:   Procedure Laterality Date   • HYSTERECTOMY     • OOPHORECTOMY Bilateral    • VEIN LIGATION AND STRIPPING         Family History:   Family History   Problem Relation Age of Onset   • Stroke Father    • Heart disease Father    • Diabetes Father        Social History:    reports that she has quit smoking. Her smoking use included cigarettes. She has a 3.75 pack-year smoking history.  She has never used smokeless tobacco. She reports that she does not drink alcohol and does not use drugs.    Home Medications:  Insulin Pen Needle, Pramipexole Dihydrochloride, arformoterol, budesonide, folic acid, hydroxychloroquine, insulin detemir, losartan, metFORMIN, methotrexate, metoprolol tartrate, and tiotropium bromide monohydrate    Allergies:  Allergies   Allergen Reactions   • Sulfa Antibiotics Nausea And Vomiting       Review of Systems   All systems were reviewed and negative except for: Abdominal pain, nausea, vomiting, constipation    Objective   Objective     Vitals:   Temp:  [98.2 °F (36.8 °C)] 98.2 °F (36.8 °C)  Heart Rate:  [103] 103  Resp:  [16-20] 16  BP: (160-166)/(76-83) 166/76    Physical Exam    Constitutional: Awake, alert, no acute distress   Eyes: Pupils equal, sclerae anicteric, no conjunctival injection   HENT: NCAT, mucous membranes moist   Neck: Supple, no thyromegaly, no lymphadenopathy, trachea midline   Respiratory: Clear to auscultation bilaterally, nonlabored respirations    Cardiovascular: RRR, no murmurs, rubs, or gallops, palpable pedal pulses bilaterally   Gastrointestinal: Positive bowel sounds, abdomen soft, mildly tender, nondistended   Musculoskeletal: No bilateral ankle edema, no clubbing or cyanosis to extremities   Psychiatric: Appropriate affect, cooperative   Neurologic: Oriented x 3, strength symmetric in all extremities, Cranial Nerves grossly intact to confrontation, speech clear   Skin: No rashes     Imaging:   CT Abdomen Pelvis With Contrast    Result Date: 10/8/2021  PROCEDURE: CT ABDOMEN PELVIS W CONTRAST  COMPARISONS: [facility], CT, CT ABDOMEN PELVIS W CONTRAST, 8/20/2021, 0:19.   [facility], CT, ABDOMEN/PELVIS WITH CONTRAST, 1/30/2020, 11:35.  INDICATIONS: Unspecified abdominal pain, beginning yesterday; the pain has worsened tonight; vomiting, appetite change, and nausea; prior history of small bowel obstruction.   TECHNIQUE: After obtaining the patient's consent, 563 CT images were created with non-ionic intravenous contrast material.  No oral contrast agent was administered for the study  PROTOCOL:   Standard imaging protocol performed    RADIATION:   DLP: 491.8mGy*cm   Automated exposure control was utilized to minimize radiation dose. CONTRAST: 100cc Isovue 370 I.V. LABS:   eGFR: >60ml/min/1.73m2  FINDINGS: A mechanical small bowel obstruction is suspected with a transition point in the right lower quadrant involving distal ileum, similar to the prior 2 abdominal/pelvic CT studies.  The obstruction may be partial or complete.  An adhesion would be in the differential diagnosis for the cause of the small bowel obstruction.  No pneumoperitoneum or pneumatosis.  No portal or mesenteric venous gas is identified.  There is an incidental 2 cm left adrenal nodule, which has not significantly changed.  It is probably benign.  No definite right adrenal nodule.  Otherwise, no acute findings are seen and no significant interval change is seen since prior CT exams.  CONCLUSION: A mechanical small bowel obstruction is suspected with a transition point involving distal ileum within the right lower quadrant, similar to that seen on the prior 2 CT exams from 8/20/2021 and 1/30/2020.  No pneumoperitoneum or pneumatosis is seen.  No definite focal extraluminal intraperitoneal fluid collection is seen to suggest abscess.  Please see above comments for further detail.      ИВАН KENT JR, MD       Electronically Signed and Approved By: ИВАН KENT JR, MD on 10/08/2021 at 22:14               Result Review    Result Review:  I have personally reviewed the results from the time of this admission to 10/8/2021 23:09 EDT and agree with these findings:  [x]  Laboratory  []  Microbiology  [x]  Radiology  []  EKG/Telemetry   []  Cardiology/Vascular   []  Pathology  [x]  Old records  []  Other:      Assessment/Plan   Assessment / Plan      Assessment:   Small bowel obstruction, likely due to adhesion  Urinary tract infection  Essential hypertension  Rheumatoid arthritis  COPD, not acutely exacerbated  Insulin-dependent diabetes mellitus    Plan:      Admit to hospitalist service  Labs and imaging reviewed  Consult placed for general surgery  Continue with NG tube placement  Gentle IV fluids overnight  Pain and nausea control are available  Keep n.p.o.  We will reconcile and resume appropriate home meds to be given when cleared for oral intake  Given n.p.o. status will check glucose every 4 hours.  May need to start D5 LR.    Patient's clinical course will dictate further management  Discussed with ED physician, RN      DVT prophylaxis:  No DVT prophylaxis order currently exists.    CODE STATUS:         Admission Status:  I believe this patient meets inpatient status.    Electronically signed by SB Cruz, 10/08/21, 11:09 PM EDT.

## 2021-10-10 LAB
ANION GAP SERPL CALCULATED.3IONS-SCNC: 15 MMOL/L (ref 5–15)
BACTERIA SPEC AEROBE CULT: NO GROWTH
BASOPHILS # BLD MANUAL: 0.03 10*3/MM3 (ref 0–0.2)
BASOPHILS NFR BLD AUTO: 1 % (ref 0–1.5)
BUN SERPL-MCNC: 8 MG/DL (ref 8–23)
BUN/CREAT SERPL: 14 (ref 7–25)
CALCIUM SPEC-SCNC: 8.1 MG/DL (ref 8.6–10.5)
CHLORIDE SERPL-SCNC: 100 MMOL/L (ref 98–107)
CO2 SERPL-SCNC: 24 MMOL/L (ref 22–29)
CREAT SERPL-MCNC: 0.57 MG/DL (ref 0.57–1)
DEPRECATED RDW RBC AUTO: 49.1 FL (ref 37–54)
EOSINOPHIL # BLD MANUAL: 0.06 10*3/MM3 (ref 0–0.4)
EOSINOPHIL NFR BLD MANUAL: 2 % (ref 0.3–6.2)
ERYTHROCYTE [DISTWIDTH] IN BLOOD BY AUTOMATED COUNT: 14.6 % (ref 12.3–15.4)
GFR SERPL CREATININE-BSD FRML MDRD: 122 ML/MIN/1.73
GLUCOSE BLDC GLUCOMTR-MCNC: 75 MG/DL (ref 70–99)
GLUCOSE BLDC GLUCOMTR-MCNC: 85 MG/DL (ref 70–99)
GLUCOSE SERPL-MCNC: 85 MG/DL (ref 65–99)
HCT VFR BLD AUTO: 34.9 % (ref 34–46.6)
HGB BLD-MCNC: 11.6 G/DL (ref 12–15.9)
LYMPHOCYTES # BLD MANUAL: 0.6 10*3/MM3 (ref 0.7–3.1)
LYMPHOCYTES NFR BLD MANUAL: 20 % (ref 19.6–45.3)
LYMPHOCYTES NFR BLD MANUAL: 22 % (ref 5–12)
MAGNESIUM SERPL-MCNC: 1.7 MG/DL (ref 1.6–2.4)
MCH RBC QN AUTO: 30.9 PG (ref 26.6–33)
MCHC RBC AUTO-ENTMCNC: 33.2 G/DL (ref 31.5–35.7)
MCV RBC AUTO: 92.8 FL (ref 79–97)
MONOCYTES # BLD AUTO: 0.66 10*3/MM3 (ref 0.1–0.9)
NEUTROPHILS # BLD AUTO: 1.66 10*3/MM3 (ref 1.7–7)
NEUTROPHILS NFR BLD MANUAL: 51 % (ref 42.7–76)
NEUTS BAND NFR BLD MANUAL: 4 % (ref 0–5)
PLAT MORPH BLD: NORMAL
PLATELET # BLD AUTO: 206 10*3/MM3 (ref 140–450)
PMV BLD AUTO: 10.2 FL (ref 6–12)
POTASSIUM SERPL-SCNC: 3.6 MMOL/L (ref 3.5–5.2)
RBC # BLD AUTO: 3.76 10*6/MM3 (ref 3.77–5.28)
RBC MORPH BLD: NORMAL
SCAN SLIDE: NORMAL
SODIUM SERPL-SCNC: 139 MMOL/L (ref 136–145)
WBC # BLD AUTO: 3.02 10*3/MM3 (ref 3.4–10.8)
WBC MORPH BLD: NORMAL

## 2021-10-10 PROCEDURE — 94799 UNLISTED PULMONARY SVC/PX: CPT

## 2021-10-10 PROCEDURE — 0 IOHEXOL 12 MG/ML SOLUTION: Performed by: SURGERY

## 2021-10-10 PROCEDURE — 83735 ASSAY OF MAGNESIUM: CPT | Performed by: INTERNAL MEDICINE

## 2021-10-10 PROCEDURE — 85025 COMPLETE CBC W/AUTO DIFF WBC: CPT | Performed by: INTERNAL MEDICINE

## 2021-10-10 PROCEDURE — 82962 GLUCOSE BLOOD TEST: CPT

## 2021-10-10 PROCEDURE — 25010000002 ENOXAPARIN PER 10 MG: Performed by: INTERNAL MEDICINE

## 2021-10-10 PROCEDURE — 25010000002 MORPHINE PER 10 MG: Performed by: PHYSICIAN ASSISTANT

## 2021-10-10 PROCEDURE — 94760 N-INVAS EAR/PLS OXIMETRY 1: CPT

## 2021-10-10 PROCEDURE — 25010000002 CEFTRIAXONE PER 250 MG: Performed by: PHYSICIAN ASSISTANT

## 2021-10-10 PROCEDURE — 25010000002 ONDANSETRON PER 1 MG: Performed by: PHYSICIAN ASSISTANT

## 2021-10-10 PROCEDURE — 99233 SBSQ HOSP IP/OBS HIGH 50: CPT | Performed by: INTERNAL MEDICINE

## 2021-10-10 PROCEDURE — 99232 SBSQ HOSP IP/OBS MODERATE 35: CPT | Performed by: SURGERY

## 2021-10-10 PROCEDURE — 80048 BASIC METABOLIC PNL TOTAL CA: CPT | Performed by: INTERNAL MEDICINE

## 2021-10-10 PROCEDURE — 85007 BL SMEAR W/DIFF WBC COUNT: CPT | Performed by: INTERNAL MEDICINE

## 2021-10-10 RX ORDER — FAMOTIDINE 10 MG/ML
20 INJECTION, SOLUTION INTRAVENOUS DAILY
Status: DISCONTINUED | OUTPATIENT
Start: 2021-10-10 | End: 2021-10-11 | Stop reason: HOSPADM

## 2021-10-10 RX ADMIN — IOHEXOL 500 ML: 12 SOLUTION ORAL at 15:25

## 2021-10-10 RX ADMIN — BUDESONIDE 0.5 MG: 0.5 SUSPENSION RESPIRATORY (INHALATION) at 20:22

## 2021-10-10 RX ADMIN — HYDROXYCHLOROQUINE SULFATE 200 MG: 200 TABLET ORAL at 19:59

## 2021-10-10 RX ADMIN — CEFTRIAXONE SODIUM 1 G: 1 INJECTION, SOLUTION INTRAVENOUS at 08:24

## 2021-10-10 RX ADMIN — SODIUM CHLORIDE, PRESERVATIVE FREE 10 ML: 5 INJECTION INTRAVENOUS at 20:00

## 2021-10-10 RX ADMIN — ONDANSETRON 4 MG: 2 INJECTION INTRAMUSCULAR; INTRAVENOUS at 08:23

## 2021-10-10 RX ADMIN — ENOXAPARIN SODIUM 40 MG: 40 INJECTION SUBCUTANEOUS at 08:24

## 2021-10-10 RX ADMIN — HYDROXYCHLOROQUINE SULFATE 200 MG: 200 TABLET ORAL at 08:23

## 2021-10-10 RX ADMIN — SODIUM CHLORIDE, POTASSIUM CHLORIDE, SODIUM LACTATE AND CALCIUM CHLORIDE 100 ML/HR: 600; 310; 30; 20 INJECTION, SOLUTION INTRAVENOUS at 17:48

## 2021-10-10 RX ADMIN — MORPHINE SULFATE 2 MG: 2 INJECTION, SOLUTION INTRAMUSCULAR; INTRAVENOUS at 10:23

## 2021-10-10 RX ADMIN — MORPHINE SULFATE 2 MG: 2 INJECTION, SOLUTION INTRAMUSCULAR; INTRAVENOUS at 02:01

## 2021-10-10 RX ADMIN — SODIUM CHLORIDE, PRESERVATIVE FREE 10 ML: 5 INJECTION INTRAVENOUS at 08:24

## 2021-10-10 RX ADMIN — FAMOTIDINE 20 MG: 10 INJECTION INTRAVENOUS at 10:08

## 2021-10-10 RX ADMIN — ARFORMOTEROL TARTRATE 15 MCG: 15 SOLUTION RESPIRATORY (INHALATION) at 20:22

## 2021-10-10 RX ADMIN — FOLIC ACID 1 MG: 1 TABLET ORAL at 08:24

## 2021-10-10 RX ADMIN — MORPHINE SULFATE 2 MG: 2 INJECTION, SOLUTION INTRAMUSCULAR; INTRAVENOUS at 14:56

## 2021-10-10 RX ADMIN — BUDESONIDE 0.5 MG: 0.5 SUSPENSION RESPIRATORY (INHALATION) at 08:05

## 2021-10-10 RX ADMIN — ARFORMOTEROL TARTRATE 15 MCG: 15 SOLUTION RESPIRATORY (INHALATION) at 08:11

## 2021-10-10 RX ADMIN — LOSARTAN POTASSIUM 50 MG: 50 TABLET, FILM COATED ORAL at 08:23

## 2021-10-10 NOTE — PLAN OF CARE
Goal Outcome Evaluation:  Plan of Care Reviewed With: patient        Progress: no change  Outcome Summary: Pt still not passing flatus; CT with contrast ordered.

## 2021-10-10 NOTE — PROGRESS NOTES
SURGERY PROGRESS NOTE     Patient Name:  Annita Rodriguez  YOB: 1935  [id number]   LOS: 2 days   * No surgery found *  Patient Care Team:  Arias Beck MD as PCP - General (Internal Medicine)      Subjective     Interval History:   T-max 100.5, heart rate   WBC 3, CO2 24.  Pain a little better.  No flatus.  Positive out of bed.    Review of Systems:    All systems were reviewed and negative except for: Abdominal pain    Objective     Vital Signs  Temp:  [98.3 °F (36.8 °C)-100.5 °F (38.1 °C)] 98.4 °F (36.9 °C)  Heart Rate:  [] 82  Resp:  [12-20] 16  BP: (143-168)/(58-77) 160/58    Physical Exam:     General Appearance:   Alert, cooperative, in no acute distress   Head:   Normocephalic, without obvious abnormality, atraumatic   Eyes:            Lids and lashes normal, conjunctivae and sclerae normal, no      Icterus    Ears:   Ears appear intact with no abnormalities noted   Throat:  No oral lesions, no thrush, oral mucosa moist   Neck:  No adenopathy, supple, trachea midline, no thyromegaly, no     carotid bruit, no JVD   Back:    no tenderness to percussion or palpation,   range of motion       normal   Lungs:    Clear to auscultation,respirations regular, even and                     unlabored    Heart:   Regular rhythm and normal rate, no murmur, no gallop, no rub   Chest Wall:   No abnormalities observed   Abdomen:    Normal bowel sounds, no masses, no organomegaly, soft          mild tenderness lower abdomen, non-distended, no guarding, no rebound                  tenderness, no peritonitis   Rectal:      Extremities:  Moves all extremities well, no edema, no cyanosis, no                redness   Skin:  No bleeding, bruising or rash   Lymph nodes:  No palpable adenopathy   Neurologic:  A/Ox4 with no deficits        Results Review:     I reviewed the patient's new clinical results.    LABS:  Lab Results (last 72 hours)     Procedure Component Value Units Date/Time    POC  Glucose Once [[id number]]  (Normal) Collected: 10/10/21 0721    Specimen: Blood Updated: 10/10/21 0724     Glucose 85 mg/dL      Comment: Serial Number: [id number]:  [id number]       Basic Metabolic Panel [[id number]]  (Abnormal) Collected: 10/10/21 0504    Specimen: Blood Updated: 10/10/21 0604     Glucose 85 mg/dL      BUN 8 mg/dL      Creatinine 0.57 mg/dL      Sodium 139 mmol/L      Potassium 3.6 mmol/L      Chloride 100 mmol/L      CO2 24.0 mmol/L      Calcium 8.1 mg/dL      eGFR  African Amer 122 mL/min/1.73      BUN/Creatinine Ratio 14.0     Anion Gap 15.0 mmol/L     Narrative:      GFR Normal >60  Chronic Kidney Disease <60  Kidney Failure <15      Magnesium [[id number]]  (Normal) Collected: 10/10/21 0504    Specimen: Blood Updated: 10/10/21 0604     Magnesium 1.7 mg/dL     Manual Differential [[id number]]  (Abnormal) Collected: 10/10/21 0504    Specimen: Blood Updated: 10/10/21 0603     Neutrophil % 51.0 %      Lymphocyte % 20.0 %      Monocyte % 22.0 %      Eosinophil % 2.0 %      Basophil % 1.0 %      Bands %  4.0 %      Neutrophils Absolute 1.66 10*3/mm3      Lymphocytes Absolute 0.60 10*3/mm3      Monocytes Absolute 0.66 10*3/mm3      Eosinophils Absolute 0.06 10*3/mm3      Basophils Absolute 0.03 10*3/mm3      RBC Morphology Normal     WBC Morphology Normal     Platelet Morphology Normal    CBC & Differential [[id number]]  (Abnormal) Collected: 10/10/21 0504    Specimen: Blood Updated: 10/10/21 0603    Narrative:      The following orders were created for panel order CBC & Differential.  Procedure                               Abnormality         Status                     ---------                               -----------         ------                     CBC Auto Differential[[id number]]        Abnormal            Final result               Scan Slide[[id number]]                                       Final result                 Please view results for these tests on the individual orders.    Scan  Slide [[id number]] Collected: 10/10/21 0504    Specimen: Blood Updated: 10/10/21 0603     Scan Slide --     Comment: See Manual Differential Results       CBC Auto Differential [[id number]]  (Abnormal) Collected: 10/10/21 0504    Specimen: Blood Updated: 10/10/21 0603     WBC 3.02 10*3/mm3      RBC 3.76 10*6/mm3      Hemoglobin 11.6 g/dL      Hematocrit 34.9 %      MCV 92.8 fL      MCH 30.9 pg      MCHC 33.2 g/dL      RDW 14.6 %      RDW-SD 49.1 fl      MPV 10.2 fL      Platelets 206 10*3/mm3     Urine Culture - Urine, Urine, Catheter [[id number]] Collected: 10/09/21 1614    Specimen: Urine, Catheter Updated: 10/09/21 2124    POC Glucose Once [[id number]]  (Abnormal) Collected: 10/09/21 1611    Specimen: Blood Updated: 10/09/21 1612     Glucose 104 mg/dL      Comment: Serial Number: [id number]:  [id number]       POC Glucose Once [[id number]]  (Abnormal) Collected: 10/09/21 1138    Specimen: Blood Updated: 10/09/21 1141     Glucose 102 mg/dL      Comment: Serial Number: [id number]:  [id number]       POC Glucose Once [[id number]]  (Abnormal) Collected: 10/09/21 0730    Specimen: Blood Updated: 10/09/21 0739     Glucose 134 mg/dL      Comment: Serial Number: [id number]:  [id number]       Basic Metabolic Panel [[id number]]  (Abnormal) Collected: 10/09/21 0558    Specimen: Blood Updated: 10/09/21 0705     Glucose 138 mg/dL      BUN 8 mg/dL      Creatinine 0.67 mg/dL      Sodium 139 mmol/L      Potassium 4.2 mmol/L      Chloride 102 mmol/L      CO2 27.3 mmol/L      Calcium 8.8 mg/dL      eGFR  African Amer 102 mL/min/1.73      BUN/Creatinine Ratio 11.9     Anion Gap 9.7 mmol/L     Narrative:      GFR Normal >60  Chronic Kidney Disease <60  Kidney Failure <15      Magnesium [[id number]]  (Normal) Collected: 10/09/21 0558    Specimen: Blood Updated: 10/09/21 0705     Magnesium 1.9 mg/dL     CBC Auto Differential [[id number]]  (Abnormal) Collected: 10/09/21 0558    Specimen: Blood Updated: 10/09/21 0630     WBC 6.24  10*3/mm3      RBC 4.15 10*6/mm3      Hemoglobin 12.7 g/dL      Hematocrit 38.2 %      MCV 92.0 fL      MCH 30.6 pg      MCHC 33.2 g/dL      RDW 14.5 %      RDW-SD 48.3 fl      MPV 10.3 fL      Platelets 249 10*3/mm3      Neutrophil % 78.8 %      Lymphocyte % 7.2 %      Monocyte % 12.7 %      Eosinophil % 0.5 %      Basophil % 0.3 %      Immature Grans % 0.5 %      Neutrophils, Absolute 4.92 10*3/mm3      Lymphocytes, Absolute 0.45 10*3/mm3      Monocytes, Absolute 0.79 10*3/mm3      Eosinophils, Absolute 0.03 10*3/mm3      Basophils, Absolute 0.02 10*3/mm3      Immature Grans, Absolute 0.03 10*3/mm3      nRBC 0.0 /100 WBC     Urinalysis, Microscopic Only - Urine, Clean Catch [[id number]]  (Abnormal) Collected: 10/08/21 2238    Specimen: Urine, Clean Catch Updated: 10/08/21 2259     RBC, UA 3-5 /HPF      WBC, UA 21-30 /HPF      Bacteria, UA 4+ /HPF      Squamous Epithelial Cells, UA 0-2 /HPF      Hyaline Casts, UA 0-2 /LPF      Methodology Automated Microscopy    Urinalysis With Microscopic If Indicated (No Culture) - Urine, Clean Catch [[id number]]  (Abnormal) Collected: 10/08/21 2238    Specimen: Urine, Clean Catch Updated: 10/08/21 2257     Color, UA Yellow     Appearance, UA Clear     pH, UA 6.5     Specific Gravity, UA 1.021     Glucose, UA Negative     Ketones, UA Trace     Bilirubin, UA Negative     Blood, UA Negative     Protein, UA Negative     Leuk Esterase, UA Small (1+)     Nitrite, UA Positive     Urobilinogen, UA 0.2 E.U./dL    Lactic Acid, Plasma [[id number]]  (Normal) Collected: 10/08/21 2139    Specimen: Blood Updated: 10/08/21 2202     Lactate 1.2 mmol/L     [place] Draw [[id number]] Collected: 10/08/21 2054    Specimen: Blood Updated: 10/08/21 2200    Narrative:      The following orders were created for panel order [place] [facility].  Procedure                               Abnormality         Status                     ---------                               -----------         ------                      Green Top (Gel)[[id number]]                                  Final result               Lavender Top[[id number]]                                     Final result               Gold Top - SST[[id number]]                                   Final result               Light Blue Top[[id number]]                                   Final result                 Please view results for these tests on the individual orders.    Lavender Top [[id number]] Collected: 10/08/21 2054    Specimen: Blood Updated: 10/08/21 2200     Extra Tube hold for add-on     Comment: Auto resulted       Gold Top - SST [[id number]] Collected: 10/08/21 2054    Specimen: Blood Updated: 10/08/21 2200     Extra Tube Hold for add-ons.     Comment: Auto resulted.       Green Top (Gel) [[id number]] Collected: 10/08/21 2054    Specimen: Blood Updated: 10/08/21 2200     Extra Tube Hold for add-ons.     Comment: Auto resulted.       Light Blue Top [[id number]] Collected: 10/08/21 2054    Specimen: Blood Updated: 10/08/21 2200     Extra Tube hold for add-on     Comment: Auto resulted       Comprehensive Metabolic Panel [[id number]]  (Abnormal) Collected: 10/08/21 2054    Specimen: Blood Updated: 10/08/21 2138     Glucose 156 mg/dL      BUN 11 mg/dL      Creatinine 0.63 mg/dL      Sodium 136 mmol/L      Potassium 3.7 mmol/L      Chloride 100 mmol/L      CO2 21.5 mmol/L      Calcium 9.3 mg/dL      Total Protein 7.1 g/dL      Albumin 4.40 g/dL      ALT (SGPT) 11 U/L      AST (SGOT) 14 U/L      Alkaline Phosphatase 80 U/L      Total Bilirubin 0.6 mg/dL      eGFR  African Amer 109 mL/min/1.73      Globulin 2.7 gm/dL      A/G Ratio 1.6 g/dL      BUN/Creatinine Ratio 17.5     Anion Gap 14.5 mmol/L     Narrative:      GFR Normal >60  Chronic Kidney Disease <60  Kidney Failure <15      Lipase [[id number]]  (Normal) Collected: 10/08/21 2054    Specimen: Blood Updated: 10/08/21 2138     Lipase 20 U/L     CBC & Differential [[id number]]  (Abnormal) Collected: 10/08/21 2054     Specimen: Blood Updated: 10/08/21 2102    Narrative:      The following orders were created for panel order CBC & Differential.  Procedure                               Abnormality         Status                     ---------                               -----------         ------                     CBC Auto Differential[[id number]]        Abnormal            Final result                 Please view results for these tests on the individual orders.    CBC Auto Differential [[id number]]  (Abnormal) Collected: 10/08/21 2054    Specimen: Blood Updated: 10/08/21 2102     WBC 6.91 10*3/mm3      RBC 4.26 10*6/mm3      Hemoglobin 13.1 g/dL      Hematocrit 39.1 %      MCV 91.8 fL      MCH 30.8 pg      MCHC 33.5 g/dL      RDW 14.5 %      RDW-SD 48.1 fl      MPV 9.9 fL      Platelets 276 10*3/mm3      Neutrophil % 76.6 %      Lymphocyte % 14.9 %      Monocyte % 7.4 %      Eosinophil % 0.4 %      Basophil % 0.4 %      Immature Grans % 0.3 %      Neutrophils, Absolute 5.29 10*3/mm3      Lymphocytes, Absolute 1.03 10*3/mm3      Monocytes, Absolute 0.51 10*3/mm3      Eosinophils, Absolute 0.03 10*3/mm3      Basophils, Absolute 0.03 10*3/mm3      Immature Grans, Absolute 0.02 10*3/mm3      nRBC 0.0 /100 WBC           IMAGING:  Imaging Results (Last 72 Hours)     Procedure Component Value Units Date/Time    CT Abdomen Pelvis With Contrast [[id number]] Collected: 10/08/21 2214     Updated: 10/08/21 2218    Narrative:      PROCEDURE: CT ABDOMEN PELVIS W CONTRAST     COMPARISONS: [facility], CT, CT ABDOMEN PELVIS W CONTRAST, 8/20/2021, 0:19.     [facility], CT, ABDOMEN/PELVIS WITH CONTRAST, 1/30/2020, 11:35.     INDICATIONS: Unspecified abdominal pain, beginning yesterday; the pain has worsened tonight;   vomiting, appetite change, and nausea; prior history of small bowel obstruction.     TECHNIQUE: After obtaining the patient's consent, 563 CT images were created with non-ionic   intravenous contrast  material.  No oral contrast agent was administered for the study     PROTOCOL:   Standard imaging protocol performed      RADIATION:   DLP: 491.8mGy*cm    Automated exposure control was utilized to minimize radiation dose.   CONTRAST: 100cc Isovue 370 I.V.  LABS:   eGFR: >60ml/min/1.73m2     FINDINGS: A mechanical small bowel obstruction is suspected with a transition point in the right   lower quadrant involving distal ileum, similar to the prior 2 abdominal/pelvic CT studies.  The   obstruction may be partial or complete.  An adhesion would be in the differential diagnosis for the   cause of the small bowel obstruction.  No pneumoperitoneum or pneumatosis.  No portal or mesenteric   venous gas is identified.  There is an incidental 2 cm left adrenal nodule, which has not   significantly changed.  It is probably benign.  No definite right adrenal nodule.  Otherwise, no   acute findings are seen and no significant interval change is seen since prior CT exams.     CONCLUSION: A mechanical small bowel obstruction is suspected with a transition point involving   distal ileum within the right lower quadrant, similar to that seen on the prior 2 CT exams from   8/20/2021 and 1/30/2020.  No pneumoperitoneum or pneumatosis is seen.  No definite focal   extraluminal intraperitoneal fluid collection is seen to suggest abscess.  Please see above   comments for further detail.                ИВАН KENT JR, MD         Electronically Signed and Approved By: ИВАН KENT JR, MD on 10/08/2021 at 22:14                           Medications:    Current Facility-Administered Medications:   •  aluminum-magnesium hydroxide-simethicone (MAALOX MAX) 400-400-40 MG/5ML suspension 15 mL, 15 mL, Nasogastric, Q6H PRN, [place], [place]  •  arformoterol (BROVANA) nebulizer solution 15 mcg, 15 mcg, Nebulization, BID - RT, [facility], [place], 15 mcg at 10/10/21 0811  •  budesonide (PULMICORT) nebulizer solution 0.5 mg, 0.5 mg,  Nebulization, BID - RT, [place], [place], 0.5 mg at 10/10/21 0805  •  cefTRIAXone (ROCEPHIN) IVPB 1 g, 1 g, Intravenous, Daily, [place], [place], Last Rate: 100 mL/hr at 10/10/21 0824, 1 g at 10/10/21 0824  •  enoxaparin (LOVENOX) syringe 40 mg, 40 mg, Subcutaneous, Daily, Arias Bradley MD, 40 mg at 10/10/21 0824  •  folic acid (FOLVITE) tablet 1 mg, 1 mg, Nasogastric, Daily, [facility], [place], 1 mg at 10/10/21 0824  •  hydroxychloroquine (PLAQUENIL) tablet 200 mg, 200 mg, Nasogastric, BID, [place], [place], 200 mg at 10/10/21 0823  •  lactated ringers infusion, 100 mL/hr, Intravenous, Continuous, [place], [place], Last Rate: 100 mL/hr at 10/09/21 2024, 100 mL/hr at 10/09/21 2024  •  losartan (COZAAR) tablet 50 mg, 50 mg, Nasogastric, Daily, [place], [place], 50 mg at 10/10/21 0823  •  morphine injection 2 mg, 2 mg, Intravenous, Q4H PRN, [place], [place], 2 mg at 10/10/21 0201  •  ondansetron (ZOFRAN) injection 4 mg, 4 mg, Intravenous, Q4H PRN, [place], [place], 4 mg at 10/10/21 0823  •  prochlorperazine (COMPAZINE) injection 2.5 mg, 2.5 mg, Intravenous, Q4H PRN, [place], [place]  •  sodium chloride 0.9 % flush 10 mL, 10 mL, Intravenous, PRN, [place], [place]  •  sodium chloride 0.9 % flush 10 mL, 10 mL, Intravenous, Q12H, [facility], [place], 10 mL at 10/10/21 0824  •  sodium chloride 0.9 % flush 10 mL, 10 mL, Intravenous, PRN, [facility], [place]    Assessment/Plan       Small bowel obstruction (HCC)  Partial small bowel obstruction versus small bowel obstruction    Continue NG tube to low intermittent wall suction until flatus.  Add GI prophylaxis.  Out of bed.  Small bowel follow-through today if radiology will do it otherwise in the morning.  Plan discussed with patient.  All questions answered.  She agrees with the plan.    Robbin Meyers MD  10/10/21  08:55 EDT

## 2021-10-10 NOTE — PROGRESS NOTES
[facility]   Hospitalist Progress Note  Date: 10/10/2021  Patient Name: Annita Rodriguez  : 1935  MRN: [id number]  Date of admission: 10/8/2021  Consultants:   -General Surgery: Dr. Robbin Meyers    Subjective   Subjective     Chief Complaint: Abdominal pain    Summary:   Annita Rodriguez is a 86 y.o. female with past medical history significant for rheumatoid arthritis, type 2 diabetes mellitus, hypertension and from small bowel (presented to the ED with complaints of abdominal pain, nausea and vomiting.  Patient endorsed minimal stool output and having not passing flatus since morning of 10/07/2021.  Evaluation in the ED significant for urinalysis consistent with UTI and CT abdomen/pelvis showing mechanical small bowel structure with transition point involving the distal ileum in the right lower quadrant symmetry previous scans.  General surgery consulted by ED.  Hospitalist service contacted for further evaluation management.  NG tube placed.  Ceftriaxone started for UTI.    Interval Followup:   No acute events overnight.  Patient states that her pain feels a little bit better.  Patient has been out of bed and ambulating.  T-max: 100.5 °F over the last 24 hours (3637-2211).  No flatus.  NG still present. Patient denies any chest pain, shortness of breath.  Nursing with no additional acute issues to report.    Antibiotics:   -Ceftriaxone    Pain Medication:   -IV Morphine    Review of Systems   10 point review of systems performed and negative unless stated otherwise under subjective.    Objective   Objective     Vitals:   Temp:  [98.3 °F (36.8 °C)-100.5 °F (38.1 °C)] 98.9 °F (37.2 °C)  Heart Rate:  [] 92  Resp:  [12-20] 14  BP: (159-168)/(58-77) 160/69  Physical Exam   Gen: No acute distress, Conversant, Pleasant, lying in bed  HEENT: MMM, Atraumatic, NG tube present  Neck: Supple, Trachea midline  Resp: CTAB, No w/r/r, equal chest rise bilaterally, no increase in work of breathing  Card:  Regular rhythm, tachycardic, No m/r/g  Abd: Soft, Nontender, Nondistended, + bowel sounds  Ext: No cyanosis, No clubbing  Neuro: CN II-XII grossly intact, No focal deficits appreciated  Psych: AAO x 3, Normal mood, Normal affect    Result Review    Result Review:  I have personally reviewed the results from the time of this admission to 10/10/2021 13:51 EDT and agree with these findings:  [x]  Laboratory: Electrolytes and creatinine stable and within normal limits.  WBC down to 3.02.  Hemoglobin down to 11.6.  [x]  Microbiology: Urine culture (10/09/2021): Pending  []  Radiology:   []  EKG/Telemetry:    []  Cardiology/Vascular:    []  Pathology:  []  Old records:  []  Other:    Assessment/Plan   Assessment / Plan     Assessment:  Small bowel obstruction  Urinary tract infection, unknown organism  Essential hypertension  Rheumatoid arthritis  COPD, not acute exacerbation  Type 2 diabetes mellitus    Plan:  -General Surgery consulted and following, appreciate assistance and recommendations in the care of this patient.  -Continue NG tube per general surgery   -Patient to remain n.p.o.  -General surgery to order small bowel follow-through  -Continue ceftriaxone for UTI (Day 2/5), tailor antibiotics pending results of urine culture  -Continue gentle IV fluids  -Continue pain control with as needed IV morphine  -Continue as needed Zofran  -Monitor electrolytes and renal function with BMP and magnesium level in the AM  -Monitor WBC and Hgb with CBC in the AM  -Clinical course will dictate further management     DVT Prophylaxis: Lovenox  Diet: NPO  Dispo: Home when medically appropriate for discharge  Code Status: Full code     Personally reviewed patients labs and imaging, discussed with patient and nurse at bedside. Discussed case with the following consultants: General Surgery.     Part of this note may be an electronic transcription/translation of spoken language to printed text using the Dragon dictation system.    DVT  prophylaxis:  Medical and mechanical DVT prophylaxis orders are present.    CODE STATUS:   Code Status: CPR  Medical Interventions (Level of Support Prior to Arrest): Full      Electronically signed by Arias Bradley MD, 10/10/21, 1:58 PM EDT.

## 2021-10-11 ENCOUNTER — APPOINTMENT (OUTPATIENT)
Dept: CT IMAGING | Facility: HOSPITAL | Age: 86
End: 2021-10-11

## 2021-10-11 VITALS
TEMPERATURE: 99.3 F | DIASTOLIC BLOOD PRESSURE: 80 MMHG | HEIGHT: 64 IN | BODY MASS INDEX: 19.35 KG/M2 | HEART RATE: 88 BPM | SYSTOLIC BLOOD PRESSURE: 116 MMHG | OXYGEN SATURATION: 98 % | WEIGHT: 113.32 LBS | RESPIRATION RATE: 16 BRPM

## 2021-10-11 LAB
ANION GAP SERPL CALCULATED.3IONS-SCNC: 16.2 MMOL/L (ref 5–15)
BUN SERPL-MCNC: 5 MG/DL (ref 8–23)
BUN/CREAT SERPL: 10.6 (ref 7–25)
CALCIUM SPEC-SCNC: 8.5 MG/DL (ref 8.6–10.5)
CHLORIDE SERPL-SCNC: 101 MMOL/L (ref 98–107)
CO2 SERPL-SCNC: 23.8 MMOL/L (ref 22–29)
CREAT SERPL-MCNC: 0.47 MG/DL (ref 0.57–1)
DEPRECATED RDW RBC AUTO: 47.8 FL (ref 37–54)
EOSINOPHIL # BLD MANUAL: 0.1 10*3/MM3 (ref 0–0.4)
EOSINOPHIL NFR BLD MANUAL: 3 % (ref 0.3–6.2)
ERYTHROCYTE [DISTWIDTH] IN BLOOD BY AUTOMATED COUNT: 14.2 % (ref 12.3–15.4)
GFR SERPL CREATININE-BSD FRML MDRD: >150 ML/MIN/1.73
GLUCOSE BLDC GLUCOMTR-MCNC: 136 MG/DL (ref 70–99)
GLUCOSE BLDC GLUCOMTR-MCNC: 79 MG/DL (ref 70–99)
GLUCOSE SERPL-MCNC: 90 MG/DL (ref 65–99)
HCT VFR BLD AUTO: 33.8 % (ref 34–46.6)
HGB BLD-MCNC: 11.4 G/DL (ref 12–15.9)
HYPOCHROMIA BLD QL: ABNORMAL
LYMPHOCYTES # BLD MANUAL: 0.59 10*3/MM3 (ref 0.7–3.1)
LYMPHOCYTES NFR BLD MANUAL: 18 % (ref 19.6–45.3)
LYMPHOCYTES NFR BLD MANUAL: 19 % (ref 5–12)
MAGNESIUM SERPL-MCNC: 1.7 MG/DL (ref 1.6–2.4)
MCH RBC QN AUTO: 30.9 PG (ref 26.6–33)
MCHC RBC AUTO-ENTMCNC: 33.7 G/DL (ref 31.5–35.7)
MCV RBC AUTO: 91.6 FL (ref 79–97)
MONOCYTES # BLD AUTO: 0.62 10*3/MM3 (ref 0.1–0.9)
NEUTROPHILS # BLD AUTO: 1.95 10*3/MM3 (ref 1.7–7)
NEUTROPHILS NFR BLD MANUAL: 57 % (ref 42.7–76)
NEUTS BAND NFR BLD MANUAL: 3 % (ref 0–5)
PLATELET # BLD AUTO: 213 10*3/MM3 (ref 140–450)
PMV BLD AUTO: 10.4 FL (ref 6–12)
POTASSIUM SERPL-SCNC: 3.3 MMOL/L (ref 3.5–5.2)
RBC # BLD AUTO: 3.69 10*6/MM3 (ref 3.77–5.28)
SCAN SLIDE: NORMAL
SMALL PLATELETS BLD QL SMEAR: ADEQUATE
SODIUM SERPL-SCNC: 141 MMOL/L (ref 136–145)
STOMATOCYTES BLD QL SMEAR: ABNORMAL
WBC # BLD AUTO: 3.25 10*3/MM3 (ref 3.4–10.8)
WBC MORPH BLD: NORMAL

## 2021-10-11 PROCEDURE — 99231 SBSQ HOSP IP/OBS SF/LOW 25: CPT | Performed by: SURGERY

## 2021-10-11 PROCEDURE — 94799 UNLISTED PULMONARY SVC/PX: CPT

## 2021-10-11 PROCEDURE — 93005 ELECTROCARDIOGRAM TRACING: CPT | Performed by: INTERNAL MEDICINE

## 2021-10-11 PROCEDURE — 80048 BASIC METABOLIC PNL TOTAL CA: CPT | Performed by: INTERNAL MEDICINE

## 2021-10-11 PROCEDURE — 94760 N-INVAS EAR/PLS OXIMETRY 1: CPT

## 2021-10-11 PROCEDURE — 99239 HOSP IP/OBS DSCHRG MGMT >30: CPT | Performed by: INTERNAL MEDICINE

## 2021-10-11 PROCEDURE — 82962 GLUCOSE BLOOD TEST: CPT

## 2021-10-11 PROCEDURE — 25010000002 ENOXAPARIN PER 10 MG: Performed by: INTERNAL MEDICINE

## 2021-10-11 PROCEDURE — 25010000002 CEFTRIAXONE PER 250 MG: Performed by: PHYSICIAN ASSISTANT

## 2021-10-11 PROCEDURE — 85007 BL SMEAR W/DIFF WBC COUNT: CPT | Performed by: INTERNAL MEDICINE

## 2021-10-11 PROCEDURE — 0 IOPAMIDOL PER 1 ML: Performed by: INTERNAL MEDICINE

## 2021-10-11 PROCEDURE — 93010 ELECTROCARDIOGRAM REPORT: CPT | Performed by: INTERNAL MEDICINE

## 2021-10-11 PROCEDURE — 83735 ASSAY OF MAGNESIUM: CPT | Performed by: INTERNAL MEDICINE

## 2021-10-11 PROCEDURE — 74177 CT ABD & PELVIS W/CONTRAST: CPT

## 2021-10-11 PROCEDURE — 85025 COMPLETE CBC W/AUTO DIFF WBC: CPT | Performed by: INTERNAL MEDICINE

## 2021-10-11 RX ADMIN — HYDROXYCHLOROQUINE SULFATE 200 MG: 200 TABLET ORAL at 08:05

## 2021-10-11 RX ADMIN — ARFORMOTEROL TARTRATE 15 MCG: 15 SOLUTION RESPIRATORY (INHALATION) at 08:32

## 2021-10-11 RX ADMIN — FAMOTIDINE 20 MG: 10 INJECTION INTRAVENOUS at 08:05

## 2021-10-11 RX ADMIN — IOPAMIDOL 100 ML: 755 INJECTION, SOLUTION INTRAVENOUS at 02:07

## 2021-10-11 RX ADMIN — SODIUM CHLORIDE, POTASSIUM CHLORIDE, SODIUM LACTATE AND CALCIUM CHLORIDE 100 ML/HR: 600; 310; 30; 20 INJECTION, SOLUTION INTRAVENOUS at 03:30

## 2021-10-11 RX ADMIN — FOLIC ACID 1 MG: 1 TABLET ORAL at 08:05

## 2021-10-11 RX ADMIN — ENOXAPARIN SODIUM 40 MG: 40 INJECTION SUBCUTANEOUS at 08:05

## 2021-10-11 RX ADMIN — SODIUM CHLORIDE, PRESERVATIVE FREE 10 ML: 5 INJECTION INTRAVENOUS at 08:06

## 2021-10-11 RX ADMIN — LOSARTAN POTASSIUM 50 MG: 50 TABLET, FILM COATED ORAL at 08:05

## 2021-10-11 RX ADMIN — CEFTRIAXONE SODIUM 1 G: 1 INJECTION, SOLUTION INTRAVENOUS at 08:06

## 2021-10-11 RX ADMIN — BUDESONIDE 0.5 MG: 0.5 SUSPENSION RESPIRATORY (INHALATION) at 08:38

## 2021-10-11 NOTE — PROGRESS NOTES
SURGERY PROGRESS NOTE     Patient Name:  Annita Rodriguez  YOB: 1935  [id number]   LOS: 3 days   * No surgery found *  Patient Care Team:  Arias Beck MD as PCP - General (Internal Medicine)      Subjective     Interval History:   4 bowel movement since yesterday.  CT abdomen and pelvis with contrast seen in the rectum.  Afebrile, vital signs stable    Review of Systems:    All systems were reviewed and negative except for: No abdominal pain nausea vomiting    Objective     Vital Signs  Temp:  [98.4 °F (36.9 °C)-99.3 °F (37.4 °C)] 99 °F (37.2 °C)  Heart Rate:  [] 104  Resp:  [12-18] 12  BP: (153-171)/(66-87) 153/87    Physical Exam:     General Appearance:   Alert, cooperative, in no acute distress   Head:   Normocephalic, without obvious abnormality, atraumatic   Eyes:            Lids and lashes normal, conjunctivae and sclerae normal, no      Icterus    Ears:   Ears appear intact with no abnormalities noted   Throat:  No oral lesions, no thrush, oral mucosa moist   Neck:  No adenopathy, supple, trachea midline, no thyromegaly, no     carotid bruit, no JVD   Back:    no tenderness to percussion or palpation,   range of motion       normal   Lungs:    Clear to auscultation,respirations regular, even and                     unlabored    Heart:   Regular rhythm and normal rate, no murmur, no gallop, no rub   Chest Wall:   No abnormalities observed   Abdomen:    Normal bowel sounds, no masses, no organomegaly, soft          less tender, non-distended, no guarding, no rebound                  tenderness   Rectal:      Extremities:  Moves all extremities well, no edema, no cyanosis, no                redness   Skin:  No bleeding, bruising or rash   Lymph nodes:  No palpable adenopathy   Neurologic:  A/Ox4 with no deficits        Results Review:     I reviewed the patient's new clinical results.    LABS:  Lab Results (last 72 hours)     Procedure Component Value Units Date/Time    POC  Glucose Once [[id number]]  (Normal) Collected: 10/11/21 0641    Specimen: Blood Updated: 10/11/21 0641     Glucose 79 mg/dL      Comment: Serial Number: [id number]:  [id number]       Manual Differential [[id number]]  (Abnormal) Collected: 10/11/21 0440    Specimen: Blood Updated: 10/11/21 0603     Neutrophil % 57.0 %      Lymphocyte % 18.0 %      Monocyte % 19.0 %      Eosinophil % 3.0 %      Bands %  3.0 %      Neutrophils Absolute 1.95 10*3/mm3      Lymphocytes Absolute 0.59 10*3/mm3      Monocytes Absolute 0.62 10*3/mm3      Eosinophils Absolute 0.10 10*3/mm3      Hypochromia Slight/1+     Stomatocytes Slight/1+     WBC Morphology Normal     Platelet Estimate Adequate    CBC & Differential [[id number]]  (Abnormal) Collected: 10/11/21 0440    Specimen: Blood Updated: 10/11/21 0603    Narrative:      The following orders were created for panel order CBC & Differential.  Procedure                               Abnormality         Status                     ---------                               -----------         ------                     CBC Auto Differential[[id number]]        Abnormal            Final result               Scan Slide[[id number]]                                       Final result                 Please view results for these tests on the individual orders.    Scan Slide [[id number]] Collected: 10/11/21 0440    Specimen: Blood Updated: 10/11/21 0603     Scan Slide --     Comment: See Manual Differential Results       CBC Auto Differential [[id number]]  (Abnormal) Collected: 10/11/21 0440    Specimen: Blood Updated: 10/11/21 0603     WBC 3.25 10*3/mm3      RBC 3.69 10*6/mm3      Hemoglobin 11.4 g/dL      Hematocrit 33.8 %      MCV 91.6 fL      MCH 30.9 pg      MCHC 33.7 g/dL      RDW 14.2 %      RDW-SD 47.8 fl      MPV 10.4 fL      Platelets 213 10*3/mm3     Basic Metabolic Panel [[id number]]  (Abnormal) Collected: 10/11/21 0440    Specimen: Blood Updated: 10/11/21 0543     Glucose 90 mg/dL      BUN  5 mg/dL      Creatinine 0.47 mg/dL      Sodium 141 mmol/L      Potassium 3.3 mmol/L      Chloride 101 mmol/L      CO2 23.8 mmol/L      Calcium 8.5 mg/dL      eGFR  African Amer >150 mL/min/1.73      BUN/Creatinine Ratio 10.6     Anion Gap 16.2 mmol/L     Narrative:      GFR Normal >60  Chronic Kidney Disease <60  Kidney Failure <15      Magnesium [[id number]]  (Normal) Collected: 10/11/21 0440    Specimen: Blood Updated: 10/11/21 0543     Magnesium 1.7 mg/dL     Urine Culture - Urine, Urine, Catheter [[id number]]  (Normal) Collected: 10/09/21 1614    Specimen: Urine, Catheter Updated: 10/10/21 2031     Urine Culture No growth    POC Glucose Once [[id number]]  (Normal) Collected: 10/10/21 1958    Specimen: Blood Updated: 10/10/21 1959     Glucose 75 mg/dL      Comment: Serial Number: [id number]:  [id number]       POC Glucose Once [[id number]]  (Normal) Collected: 10/10/21 0721    Specimen: Blood Updated: 10/10/21 0724     Glucose 85 mg/dL      Comment: Serial Number: [id number]:  [id number]       Basic Metabolic Panel [[id number]]  (Abnormal) Collected: 10/10/21 0504    Specimen: Blood Updated: 10/10/21 0604     Glucose 85 mg/dL      BUN 8 mg/dL      Creatinine 0.57 mg/dL      Sodium 139 mmol/L      Potassium 3.6 mmol/L      Chloride 100 mmol/L      CO2 24.0 mmol/L      Calcium 8.1 mg/dL      eGFR  African Amer 122 mL/min/1.73      BUN/Creatinine Ratio 14.0     Anion Gap 15.0 mmol/L     Narrative:      GFR Normal >60  Chronic Kidney Disease <60  Kidney Failure <15      Magnesium [[id number]]  (Normal) Collected: 10/10/21 0504    Specimen: Blood Updated: 10/10/21 0604     Magnesium 1.7 mg/dL     Manual Differential [[id number]]  (Abnormal) Collected: 10/10/21 0504    Specimen: Blood Updated: 10/10/21 0603     Neutrophil % 51.0 %      Lymphocyte % 20.0 %      Monocyte % 22.0 %      Eosinophil % 2.0 %      Basophil % 1.0 %      Bands %  4.0 %      Neutrophils Absolute 1.66 10*3/mm3      Lymphocytes Absolute 0.60  10*3/mm3      Monocytes Absolute 0.66 10*3/mm3      Eosinophils Absolute 0.06 10*3/mm3      Basophils Absolute 0.03 10*3/mm3      RBC Morphology Normal     WBC Morphology Normal     Platelet Morphology Normal    CBC & Differential [[id number]]  (Abnormal) Collected: 10/10/21 0504    Specimen: Blood Updated: 10/10/21 0603    Narrative:      The following orders were created for panel order CBC & Differential.  Procedure                               Abnormality         Status                     ---------                               -----------         ------                     CBC Auto Differential[[id number]]        Abnormal            Final result               Scan Slide[[id number]]                                       Final result                 Please view results for these tests on the individual orders.    Scan Slide [[id number]] Collected: 10/10/21 0504    Specimen: Blood Updated: 10/10/21 0603     Scan Slide --     Comment: See Manual Differential Results       CBC Auto Differential [[id number]]  (Abnormal) Collected: 10/10/21 0504    Specimen: Blood Updated: 10/10/21 0603     WBC 3.02 10*3/mm3      RBC 3.76 10*6/mm3      Hemoglobin 11.6 g/dL      Hematocrit 34.9 %      MCV 92.8 fL      MCH 30.9 pg      MCHC 33.2 g/dL      RDW 14.6 %      RDW-SD 49.1 fl      MPV 10.2 fL      Platelets 206 10*3/mm3     POC Glucose Once [[id number]]  (Abnormal) Collected: 10/09/21 1611    Specimen: Blood Updated: 10/09/21 1612     Glucose 104 mg/dL      Comment: Serial Number: [id number]:  [id number]       POC Glucose Once [[id number]]  (Abnormal) Collected: 10/09/21 1138    Specimen: Blood Updated: 10/09/21 1141     Glucose 102 mg/dL      Comment: Serial Number: [id number]:  [id number]       POC Glucose Once [[id number]]  (Abnormal) Collected: 10/09/21 0730    Specimen: Blood Updated: 10/09/21 0739     Glucose 134 mg/dL      Comment: Serial Number: [id number]:  [id number]       Basic Metabolic Panel  [[id number]]  (Abnormal) Collected: 10/09/21 0558    Specimen: Blood Updated: 10/09/21 0705     Glucose 138 mg/dL      BUN 8 mg/dL      Creatinine 0.67 mg/dL      Sodium 139 mmol/L      Potassium 4.2 mmol/L      Chloride 102 mmol/L      CO2 27.3 mmol/L      Calcium 8.8 mg/dL      eGFR  African Amer 102 mL/min/1.73      BUN/Creatinine Ratio 11.9     Anion Gap 9.7 mmol/L     Narrative:      GFR Normal >60  Chronic Kidney Disease <60  Kidney Failure <15      Magnesium [[id number]]  (Normal) Collected: 10/09/21 0558    Specimen: Blood Updated: 10/09/21 0705     Magnesium 1.9 mg/dL     CBC Auto Differential [[id number]]  (Abnormal) Collected: 10/09/21 0558    Specimen: Blood Updated: 10/09/21 0630     WBC 6.24 10*3/mm3      RBC 4.15 10*6/mm3      Hemoglobin 12.7 g/dL      Hematocrit 38.2 %      MCV 92.0 fL      MCH 30.6 pg      MCHC 33.2 g/dL      RDW 14.5 %      RDW-SD 48.3 fl      MPV 10.3 fL      Platelets 249 10*3/mm3      Neutrophil % 78.8 %      Lymphocyte % 7.2 %      Monocyte % 12.7 %      Eosinophil % 0.5 %      Basophil % 0.3 %      Immature Grans % 0.5 %      Neutrophils, Absolute 4.92 10*3/mm3      Lymphocytes, Absolute 0.45 10*3/mm3      Monocytes, Absolute 0.79 10*3/mm3      Eosinophils, Absolute 0.03 10*3/mm3      Basophils, Absolute 0.02 10*3/mm3      Immature Grans, Absolute 0.03 10*3/mm3      nRBC 0.0 /100 WBC     Urinalysis, Microscopic Only - Urine, Clean Catch [[id number]]  (Abnormal) Collected: 10/08/21 2238    Specimen: Urine, Clean Catch Updated: 10/08/21 2259     RBC, UA 3-5 /HPF      WBC, UA 21-30 /HPF      Bacteria, UA 4+ /HPF      Squamous Epithelial Cells, UA 0-2 /HPF      Hyaline Casts, UA 0-2 /LPF      Methodology Automated Microscopy    Urinalysis With Microscopic If Indicated (No Culture) - Urine, Clean Catch [[id number]]  (Abnormal) Collected: 10/08/21 2238    Specimen: Urine, Clean Catch Updated: 10/08/21 2257     Color, UA Yellow     Appearance, UA Clear     pH, UA 6.5     Specific Gravity,  UA 1.021     Glucose, UA Negative     Ketones, UA Trace     Bilirubin, UA Negative     Blood, UA Negative     Protein, UA Negative     Leuk Esterase, UA Small (1+)     Nitrite, UA Positive     Urobilinogen, UA 0.2 E.U./dL    Lactic Acid, Plasma [[id number]]  (Normal) Collected: 10/08/21 2139    Specimen: Blood Updated: 10/08/21 2202     Lactate 1.2 mmol/L     [place] Draw [[id number]] Collected: 10/08/21 2054    Specimen: Blood Updated: 10/08/21 2200    Narrative:      The following orders were created for panel order [place] Draw.  Procedure                               Abnormality         Status                     ---------                               -----------         ------                     Green Top (Gel)[[id number]]                                  Final result               Lavender Top[[id number]]                                     Final result               Gold Top - SST[[id number]]                                   Final result               Light Blue Top[[id number]]                                   Final result                 Please view results for these tests on the individual orders.    Lavender Top [[id number]] Collected: 10/08/21 2054    Specimen: Blood Updated: 10/08/21 2200     Extra Tube hold for add-on     Comment: Auto resulted       Gold Top - SST [[id number]] Collected: 10/08/21 2054    Specimen: Blood Updated: 10/08/21 2200     Extra Tube Hold for add-ons.     Comment: Auto resulted.       Green Top (Gel) [[id number]] Collected: 10/08/21 2054    Specimen: Blood Updated: 10/08/21 2200     Extra Tube Hold for add-ons.     Comment: Auto resulted.       Light Blue Top [[id number]] Collected: 10/08/21 2054    Specimen: Blood Updated: 10/08/21 2200     Extra Tube hold for add-on     Comment: Auto resulted       Comprehensive Metabolic Panel [[id number]]  (Abnormal) Collected: 10/08/21 2054    Specimen: Blood Updated: 10/08/21 2138     Glucose 156 mg/dL      BUN 11 mg/dL      Creatinine 0.63  mg/dL      Sodium 136 mmol/L      Potassium 3.7 mmol/L      Chloride 100 mmol/L      CO2 21.5 mmol/L      Calcium 9.3 mg/dL      Total Protein 7.1 g/dL      Albumin 4.40 g/dL      ALT (SGPT) 11 U/L      AST (SGOT) 14 U/L      Alkaline Phosphatase 80 U/L      Total Bilirubin 0.6 mg/dL      eGFR  African Amer 109 mL/min/1.73      Globulin 2.7 gm/dL      A/G Ratio 1.6 g/dL      BUN/Creatinine Ratio 17.5     Anion Gap 14.5 mmol/L     Narrative:      GFR Normal >60  Chronic Kidney Disease <60  Kidney Failure <15      Lipase [[id number]]  (Normal) Collected: 10/08/21 2054    Specimen: Blood Updated: 10/08/21 2138     Lipase 20 U/L     CBC & Differential [[id number]]  (Abnormal) Collected: 10/08/21 2054    Specimen: Blood Updated: 10/08/21 2102    Narrative:      The following orders were created for panel order CBC & Differential.  Procedure                               Abnormality         Status                     ---------                               -----------         ------                     CBC Auto Differential[[id number]]        Abnormal            Final result                 Please view results for these tests on the individual orders.    CBC Auto Differential [[id number]]  (Abnormal) Collected: 10/08/21 2054    Specimen: Blood Updated: 10/08/21 2102     WBC 6.91 10*3/mm3      RBC 4.26 10*6/mm3      Hemoglobin 13.1 g/dL      Hematocrit 39.1 %      MCV 91.8 fL      MCH 30.8 pg      MCHC 33.5 g/dL      RDW 14.5 %      RDW-SD 48.1 fl      MPV 9.9 fL      Platelets 276 10*3/mm3      Neutrophil % 76.6 %      Lymphocyte % 14.9 %      Monocyte % 7.4 %      Eosinophil % 0.4 %      Basophil % 0.4 %      Immature Grans % 0.3 %      Neutrophils, Absolute 5.29 10*3/mm3      Lymphocytes, Absolute 1.03 10*3/mm3      Monocytes, Absolute 0.51 10*3/mm3      Eosinophils, Absolute 0.03 10*3/mm3      Basophils, Absolute 0.03 10*3/mm3      Immature Grans, Absolute 0.02 10*3/mm3      nRBC 0.0 /100 WBC           IMAGING:  Imaging  Results (Last 72 Hours)     Procedure Component Value Units Date/Time    CT Abdomen Pelvis With Contrast [[id number]] Collected: 10/11/21 0252     Updated: 10/11/21 0255    Narrative:      PROCEDURE: CT ABDOMEN PELVIS W CONTRAST     COMPARISON: [facility], CT, CT ABDOMEN PELVIS W CONTRAST, 10/08/2021, 22:02.     INDICATIONS: pSBO vs SBO     TECHNIQUE: After obtaining the patient's consent, CT images were created with non-ionic intravenous   contrast material.       PROTOCOL:   Standard imaging protocol performed      RADIATION:   DLP: 201 mGy*cm    Automated exposure control was utilized to minimize radiation dose.   CONTRAST: 100 cc Isovue 370 I.V.     FINDINGS:   Lung bases are without consolidation.  Heart size normal.  No pericardial effusion or pleural   effusion.     The liver and spleen are normal in size and contour.  There is normal appearance of the right   adrenal gland.  Left adrenal gland nodule unchanged measuring 1.4 cm, nonspecific.  Pancreas   without findings of pancreatitis.  The gallbladder is absent.  Dilatation of the common bile duct   likely related to post cholecystectomy state.  Kidneys without hydronephrosis.  Multiple small   low-density bilateral renal lesions likely cysts.  No obstructing renal calculus or ureteral   calculus.  No adnexal mass.  Scarring at the upper pole left kidney.  Uterus absent.     Oral contrast reaches the level of the rectum.  Appendix nonvisualized.  Extensive colonic   diverticulosis without diverticulitis.  There is an esophagogastric tube terminating in the mid   body of the stomach.  No pneumoperitoneum or pneumatosis intestinalis.  The portal vein, splenic   vein, and superior mesenteric vein are patent.  Moderate vascular calcifications involving the   abdominal aorta and branch vasculature.  Trace fluid in the right paracolic gutter.  No organized   fluid collection in the abdomen or pelvis.       There is improved appearance of previous  small bowel obstruction.  There is residual mildly dilated   bowel loop measuring 3 cm in the right lower quadrant image 53.  The remaining small bowel loops   are normal in caliber.  There is wall thickening of several distal ileal bowel loops which may   relate to nonspecific enteritis.  Linear filling defect in the left ovarian vein noted on image 40   likely small nonocclusive thrombus.  Grade 1 anterolisthesis of L4 on L5 measuring 6 mm related to   facet arthropathy.  Disc disease at the L4-5 and L5-S1 levels.  Negative for acute fracture.     CONCLUSION:   1. Improved small-bowel obstruction.  Residual borderline dilated bowel loop in right lower   quadrant measuring up to 3 cm.   Oral contrast reaches level of the rectum. Wall thickening of   several ileal bowel loops in right lower quadrant distal to area of previous obstruction may relate   to nonspecific enteritis.  2. Pancolonic diverticulosis without diverticulitis.  3. Filling defect in left ovarian vein may relate to small nonocclusive thrombus, less likely   venous mixing.  4. Additional chronic findings above.              MONAE HOGUE MD         Electronically Signed and Approved By: MONAE HOGUE MD on 10/11/2021 at 2:59                     CT Abdomen Pelvis With Contrast [[id number]] Collected: 10/08/21 2214     Updated: 10/08/21 2218    Narrative:      PROCEDURE: CT ABDOMEN PELVIS W CONTRAST     COMPARISONS: [facility], CT, CT ABDOMEN PELVIS W CONTRAST, 8/20/2021, 0:19.     [facility], CT, ABDOMEN/PELVIS WITH CONTRAST, 1/30/2020, 11:35.     INDICATIONS: Unspecified abdominal pain, beginning yesterday; the pain has worsened tonight;   vomiting, appetite change, and nausea; prior history of small bowel obstruction.     TECHNIQUE: After obtaining the patient's consent, 563 CT images were created with non-ionic   intravenous contrast material.  No oral contrast agent was administered for the study     PROTOCOL:   Standard  imaging protocol performed      RADIATION:   DLP: 491.8mGy*cm    Automated exposure control was utilized to minimize radiation dose.   CONTRAST: 100cc Isovue 370 I.V.  LABS:   eGFR: >60ml/min/1.73m2     FINDINGS: A mechanical small bowel obstruction is suspected with a transition point in the right   lower quadrant involving distal ileum, similar to the prior 2 abdominal/pelvic CT studies.  The   obstruction may be partial or complete.  An adhesion would be in the differential diagnosis for the   cause of the small bowel obstruction.  No pneumoperitoneum or pneumatosis.  No portal or mesenteric   venous gas is identified.  There is an incidental 2 cm left adrenal nodule, which has not   significantly changed.  It is probably benign.  No definite right adrenal nodule.  Otherwise, no   acute findings are seen and no significant interval change is seen since prior CT exams.     CONCLUSION: A mechanical small bowel obstruction is suspected with a transition point involving   distal ileum within the right lower quadrant, similar to that seen on the prior 2 CT exams from   8/20/2021 and 1/30/2020.  No pneumoperitoneum or pneumatosis is seen.  No definite focal   extraluminal intraperitoneal fluid collection is seen to suggest abscess.  Please see above   comments for further detail.                ИВАН KENT JR, MD         Electronically Signed and Approved By: ИВАН KENT JR, MD on 10/08/2021 at 22:14                           Medications:    Current Facility-Administered Medications:   •  aluminum-magnesium hydroxide-simethicone (MAALOX MAX) 400-400-40 MG/5ML suspension 15 mL, 15 mL, Nasogastric, Q6H PRN, [place], [place]  •  arformoterol (BROVANA) nebulizer solution 15 mcg, 15 mcg, Nebulization, BID - RT, [place], [place], 15 mcg at 10/10/21 2022  •  budesonide (PULMICORT) nebulizer solution 0.5 mg, 0.5 mg, Nebulization, BID - RT, [place], [place], 0.5 mg at 10/10/21 2022  •  cefTRIAXone (ROCEPHIN) IVPB  1 g, 1 g, Intravenous, Daily, [place], [place], Last Rate: 100 mL/hr at 10/11/21 0806, 1 g at 10/11/21 0806  •  enoxaparin (LOVENOX) syringe 40 mg, 40 mg, Subcutaneous, Daily, Arias Bradley MD, 40 mg at 10/11/21 0805  •  famotidine (PEPCID) injection 20 mg, 20 mg, Intravenous, Daily, Robbin Meyers MD, 20 mg at 10/11/21 0805  •  folic acid (FOLVITE) tablet 1 mg, 1 mg, Nasogastric, Daily, [place], [place], 1 mg at 10/11/21 0805  •  hydroxychloroquine (PLAQUENIL) tablet 200 mg, 200 mg, Nasogastric, BID, [place], [place], 200 mg at 10/11/21 0805  •  lactated ringers infusion, 100 mL/hr, Intravenous, Continuous, [place], [place], Last Rate: 100 mL/hr at 10/11/21 0330, 100 mL/hr at 10/11/21 0330  •  losartan (COZAAR) tablet 50 mg, 50 mg, Nasogastric, Daily, [place], [place], 50 mg at 10/11/21 0805  •  morphine injection 2 mg, 2 mg, Intravenous, Q4H PRN, [place], [place], 2 mg at 10/10/21 1456  •  ondansetron (ZOFRAN) injection 4 mg, 4 mg, Intravenous, Q4H PRN, [place], [place], 4 mg at 10/10/21 0823  •  prochlorperazine (COMPAZINE) injection 2.5 mg, 2.5 mg, Intravenous, Q4H PRN, [place], [place]  •  sodium chloride 0.9 % flush 10 mL, 10 mL, Intravenous, PRN, [place], [place]  •  sodium chloride 0.9 % flush 10 mL, 10 mL, Intravenous, Q12H, [place], [place], 10 mL at 10/11/21 0806  •  sodium chloride 0.9 % flush 10 mL, 10 mL, Intravenous, PRN, [place], [place]    Assessment/Plan       Small bowel obstruction (HCC)    Partial small bowel obstruction resolving    I reviewed her CAT scan.  Contrast seen in the rectum.  She has had several bowel movements since that time.  We will DC NG tube, start clear liquid diet.  Okay to discharge home later today if tolerates diet.  Stay on liquid, soft diet over the next day or 2.  Follow-up as needed.  All questions answered.  She agrees with the plan.         Robbin Meyers MD  10/11/21  08:11 EDT

## 2021-10-11 NOTE — DISCHARGE SUMMARY
[facility]         HOSPITALIST  DISCHARGE SUMMARY    Patient Name: Annita Rodriguez  : 1935  MRN: [id number]    Date of Admission: 10/8/2021  Date of Discharge:  10/11/21  Primary Care Physician: Arias Beck MD    Consultants:  -General Surgery: Dr. Robbin Meyers    [facility] Problems:  Small bowel obstruction  Urinary tract infection, unknown organism  Essential hypertension  Rheumatoid arthritis  COPD, not acute exacerbation  Type 2 diabetes mellitus  History of transient atrial flutter    Hospital Course     Hospital Course:  Annita Rodriguez is a 86 y.o. female with past medical history significant for rheumatoid arthritis, type 2 diabetes mellitus, hypertension and from small bowel (presented to the ED with complaints of abdominal pain, nausea and vomiting.  Patient endorsed minimal stool output and having not passing flatus since morning of 10/07/2021.  Evaluation in the ED significant for urinalysis consistent with UTI and CT abdomen/pelvis showing mechanical small bowel structure with transition point involving the distal ileum in the right lower quadrant symmetry previous scans.  General surgery consulted by ED.  Hospitalist service contacted for further evaluation management.  NG tube placed.  Patient completed treatment for UTI with ceftriaxone during admission.  Repeat imaging was obtained and showed contrast seen in the rectum.  Patient had bowel movements and abdominal pain was improving.  NG tube was removed and diet started and patient tolerated without issue.  Of note, patient stated that immediately after NG was removed she had some palpitations and her heart rate was found to be at 143.  No EKG was obtained at this time, however, prior to discharge an EKG was obtained and showed patient to be in sinus rhythm.  Records were reviewed and patient found to have diagnosis of transient atrial flutter with variable conduction in the past and had been discharged on  metoprolol at that time, this was not a known home medication on this admission.  We will resume this medication at discharge.  Also recommend that patient's PCP consider ordering Holter monitor as an outpatient.  Patient is hemodynamically stable no additional inpatient valuation work-up necessary at this time, patient will discharge home with PCP follow-up.    DISCHARGE Follow Up Recommendations for labs and diagnostics:   -Follow-up with PCP in 3 to 5 days    Day of Discharge     Vital Signs:  Temp:  [98.8 °F (37.1 °C)-99.3 °F (37.4 °C)] 99.3 °F (37.4 °C)  Heart Rate:  [] 88  Resp:  [12-18] 16  BP: (116-171)/(70-87) 116/80  Physical Exam:   Gen: No acute distress, Conversant, Pleasant, lying in bed talking with family at bedside  HEENT: MMM, Atraumatic, NG tube present  Neck: Supple, Trachea midline  Resp: CTAB, No w/r/r,  normal respiratory effort, equal chest rise bilaterally  Card: RRR, No m/r/g  Abd: Soft, Nontender, Nondistended, + bowel sounds  Ext: No cyanosis, No clubbing  Neuro: CN II-XII grossly intact, No focal deficits appreciated  Psych: AAO x 3, Normal mood, Normal affect    Discharge Details        Discharge Medications      New Medications      Instructions Start Date   metoprolol tartrate 25 MG tablet  Commonly known as: LOPRESSOR   12.5 mg, Oral, 2 Times Daily         Continue These Medications      Instructions Start Date   Brovana 15 MCG/2ML nebulizer solution  Generic drug: arformoterol   15 mcg, Nebulization, 2 Times Daily - RT      budesonide 0.5 MG/2ML nebulizer solution  Commonly known as: PULMICORT   0.5 mg, Nebulization, 2 times daily      folic acid 1 MG tablet  Commonly known as: FOLVITE   1 mg, Oral, Daily      hydroxychloroquine 200 MG tablet  Commonly known as: PLAQUENIL   200 mg, Oral, 2 Times Daily      Levemir FlexTouch 100 UNIT/ML injection  Generic drug: insulin detemir   6 Units, Subcutaneous, Nightly      losartan 50 MG tablet  Commonly known as: COZAAR   50 mg, Oral,  Daily      metFORMIN 500 MG tablet  Commonly known as: GLUCOPHAGE   500 mg, Oral, 2 Times Daily With Meals      methotrexate 2.5 MG tablet   25 mg, Oral, Weekly      MIRAPEX PO   Oral, Weekly      Spiriva Respimat 2.5 MCG/ACT aerosol solution inhaler  Generic drug: tiotropium bromide monohydrate   2 puffs, Inhalation, Daily - RT         Stop These Medications    amLODIPine 10 MG tablet  Commonly known as: NORVASC            Allergies   Allergen Reactions   • Sulfa Antibiotics Nausea And Vomiting       Discharge Disposition:  Home or Self Care    Diet:  Hospital:  Diet Order   Procedures   • Diet Full Liquid       Discharge Activity:   Activity Instructions     Activity as Tolerated            CODE STATUS:  Code Status and Medical Interventions:   Ordered at: 10/08/21 2326     Code Status:    CPR     Medical Interventions (Level of Support Prior to Arrest):    Full       Future Appointments   Date Time Provider Department Center   12/6/2021  8:15 AM Dottie Avila APRN [facility]       Additional Instructions for the Follow-ups that You Need to Schedule     Discharge Follow-up with PCP   As directed       Currently Documented PCP:    Arias Beck MD    PCP Phone Number:    [phone]     Follow Up Details: Follow-up in 3 to 5 days               Pertinent  and/or Most Recent Results     RADIOLOGY:  CT Abdomen Pelvis With Contrast [[id number]] Arsen as Reviewed   Order Status: Completed Collected: 10/11/21 0252    Updated: 10/11/21 0255   Narrative:     PROCEDURE: CT ABDOMEN PELVIS W CONTRAST       COMPARISON: [facility], CT, CT ABDOMEN PELVIS W CONTRAST, 10/08/2021, 22:02.       INDICATIONS: pSBO vs SBO       TECHNIQUE: After obtaining the patient's consent, CT images were created with non-ionic intravenous   contrast material.         PROTOCOL:   Standard imaging protocol performed       RADIATION:   DLP: 201 mGy*cm     Automated exposure control was utilized to minimize radiation dose.    CONTRAST: 100 cc Isovue 370 I.V.       FINDINGS:   Lung bases are without consolidation.  Heart size normal.  No pericardial effusion or pleural   effusion.       The liver and spleen are normal in size and contour.  There is normal appearance of the right   adrenal gland.  Left adrenal gland nodule unchanged measuring 1.4 cm, nonspecific.  Pancreas   without findings of pancreatitis.  The gallbladder is absent.  Dilatation of the common bile duct   likely related to post cholecystectomy state.  Kidneys without hydronephrosis.  Multiple small   low-density bilateral renal lesions likely cysts.  No obstructing renal calculus or ureteral   calculus.  No adnexal mass.  Scarring at the upper pole left kidney.  Uterus absent.       Oral contrast reaches the level of the rectum.  Appendix nonvisualized.  Extensive colonic   diverticulosis without diverticulitis.  There is an esophagogastric tube terminating in the mid   body of the stomach.  No pneumoperitoneum or pneumatosis intestinalis.  The portal vein, splenic   vein, and superior mesenteric vein are patent.  Moderate vascular calcifications involving the   abdominal aorta and branch vasculature.  Trace fluid in the right paracolic gutter.  No organized   fluid collection in the abdomen or pelvis.         There is improved appearance of previous small bowel obstruction.  There is residual mildly dilated   bowel loop measuring 3 cm in the right lower quadrant image 53.  The remaining small bowel loops   are normal in caliber.  There is wall thickening of several distal ileal bowel loops which may   relate to nonspecific enteritis.  Linear filling defect in the left ovarian vein noted on image 40   likely small nonocclusive thrombus.  Grade 1 anterolisthesis of L4 on L5 measuring 6 mm related to   facet arthropathy.  Disc disease at the L4-5 and L5-S1 levels.  Negative for acute fracture.       CONCLUSION:   1. Improved small-bowel obstruction.  Residual borderline  dilated bowel loop in right lower   quadrant measuring up to 3 cm.   Oral contrast reaches level of the rectum. Wall thickening of   several ileal bowel loops in right lower quadrant distal to area of previous obstruction may relate   to nonspecific enteritis.   2. Pancolonic diverticulosis without diverticulitis.   3. Filling defect in left ovarian vein may relate to small nonocclusive thrombus, less likely   venous mixing.   4. Additional chronic findings above.                  MONAE HOGUE MD         Electronically Signed and Approved By: MONAE HOGUE MD on 10/11/2021 at 2:59                      CT Abdomen Pelvis With Contrast [[id number]] Arsen as Reviewed   Order Status: Completed Collected: 10/08/21 2214    Updated: 10/08/21 2218   Narrative:     PROCEDURE: CT ABDOMEN PELVIS W CONTRAST       COMPARISONS: [facility], CT, CT ABDOMEN PELVIS W CONTRAST, 8/20/2021, 0:19.     [facility], CT, ABDOMEN/PELVIS WITH CONTRAST, 1/30/2020, 11:35.       INDICATIONS: Unspecified abdominal pain, beginning yesterday; the pain has worsened tonight;   vomiting, appetite change, and nausea; prior history of small bowel obstruction.       TECHNIQUE: After obtaining the patient's consent, 563 CT images were created with non-ionic   intravenous contrast material.  No oral contrast agent was administered for the study       PROTOCOL:   Standard imaging protocol performed       RADIATION:   DLP: 491.8mGy*cm     Automated exposure control was utilized to minimize radiation dose.   CONTRAST: 100cc Isovue 370 I.V.   LABS:   eGFR: >60ml/min/1.73m2       FINDINGS: A mechanical small bowel obstruction is suspected with a transition point in the right   lower quadrant involving distal ileum, similar to the prior 2 abdominal/pelvic CT studies.  The   obstruction may be partial or complete.  An adhesion would be in the differential diagnosis for the   cause of the small bowel obstruction.  No pneumoperitoneum or  pneumatosis.  No portal or mesenteric   venous gas is identified.  There is an incidental 2 cm left adrenal nodule, which has not   significantly changed.  It is probably benign.  No definite right adrenal nodule.  Otherwise, no   acute findings are seen and no significant interval change is seen since prior CT exams.       CONCLUSION: A mechanical small bowel obstruction is suspected with a transition point involving   distal ileum within the right lower quadrant, similar to that seen on the prior 2 CT exams from   8/20/2021 and 1/30/2020.  No pneumoperitoneum or pneumatosis is seen.  No definite focal   extraluminal intraperitoneal fluid collection is seen to suggest abscess.  Please see above   comments for further detail.                     ИВАН KENT JR, MD         Electronically Signed and Approved By: ИВАН KENT JR, MD on 10/08/2021 at 22:14            LAB RESULTS:      Lab 10/11/21  0440 10/10/21  0504 10/09/21  0558 10/08/21  2139 10/08/21  2054   WBC 3.25* 3.02* 6.24  --  6.91   HEMOGLOBIN 11.4* 11.6* 12.7  --  13.1   HEMATOCRIT 33.8* 34.9 38.2  --  39.1   PLATELETS 213 206 249  --  276   NEUTROS ABS 1.95 1.66* 4.92  --  5.29   IMMATURE GRANS (ABS)  --   --  0.03  --  0.02   LYMPHS ABS  --   --  0.45*  --  1.03   MONOS ABS  --   --  0.79  --  0.51   EOS ABS 0.10 0.06 0.03  --  0.03   MCV 91.6 92.8 92.0  --  91.8   LACTATE  --   --   --  1.2  --          Lab 10/11/21  0440 10/10/21  0504 10/09/21  0558 10/08/21  2054   SODIUM 141 139 139 136   POTASSIUM 3.3* 3.6 4.2 3.7   CHLORIDE 101 100 102 100   CO2 23.8 24.0 27.3 21.5*   ANION GAP 16.2* 15.0 9.7 14.5   BUN 5* 8 8 11   CREATININE 0.47* 0.57 0.67 0.63   GLUCOSE 90 85 138* 156*   CALCIUM 8.5* 8.1* 8.8 9.3   MAGNESIUM 1.7 1.7 1.9  --          Lab 10/08/21  2054   TOTAL PROTEIN 7.1   ALBUMIN 4.40   GLOBULIN 2.7   ALT (SGPT) 11   AST (SGOT) 14   BILIRUBIN 0.6   ALK PHOS 80   LIPASE 20           Brief Urine Lab Results  (Last result in the past 365  days)      Color   Clarity   Blood   Leuk Est   Nitrite   Protein   CREAT   Urine HCG        10/08/21 2238 Yellow   Clear   Negative   Small (1+)   Positive   Negative               Microbiology Results (last 10 days)     Procedure Component Value - Date/Time    Urine Culture - Urine, Urine, Catheter [[id number]]  (Normal) Collected: 10/09/21 1614    Lab Status: Final result Specimen: Urine, Catheter Updated: 10/10/21 2031     Urine Culture No growth          Time spent on Discharge including face to face service: Greater than 30 minutes    Electronically signed by Arias Bradley MD, 10/11/21, 3:31 PM EDT.

## 2021-10-12 ENCOUNTER — READMISSION MANAGEMENT (OUTPATIENT)
Dept: CALL CENTER | Facility: HOSPITAL | Age: 86
End: 2021-10-12

## 2021-10-12 NOTE — OUTREACH NOTE
Prep Survey      Responses   [facility] patient discharged from? [place]   Is LACE score < 7 ? Yes   Emergency Room discharge w/ pulse ox? No   Eligibility Not Eligible   What are the reasons patient is not eligible? Other   Does the patient have one of the following disease processes/diagnoses(primary or secondary)? Other   Prep survey completed? Yes          Margaret Arambula RN

## 2021-10-31 LAB — QT INTERVAL: 368 MS

## 2021-12-06 ENCOUNTER — OFFICE VISIT (OUTPATIENT)
Dept: PULMONOLOGY | Facility: CLINIC | Age: 86
End: 2021-12-06

## 2021-12-06 VITALS
RESPIRATION RATE: 14 BRPM | WEIGHT: 117 LBS | DIASTOLIC BLOOD PRESSURE: 81 MMHG | HEART RATE: 82 BPM | SYSTOLIC BLOOD PRESSURE: 183 MMHG | HEIGHT: 63 IN | BODY MASS INDEX: 20.73 KG/M2 | OXYGEN SATURATION: 99 %

## 2021-12-06 DIAGNOSIS — J44.9 ASTHMA-COPD OVERLAP SYNDROME (HCC): Primary | ICD-10-CM

## 2021-12-06 DIAGNOSIS — M06.9 RHEUMATOID ARTHRITIS, INVOLVING UNSPECIFIED SITE, UNSPECIFIED WHETHER RHEUMATOID FACTOR PRESENT (HCC): ICD-10-CM

## 2021-12-06 DIAGNOSIS — K21.9 GASTROESOPHAGEAL REFLUX DISEASE, UNSPECIFIED WHETHER ESOPHAGITIS PRESENT: ICD-10-CM

## 2021-12-06 DIAGNOSIS — G47.33 OSA (OBSTRUCTIVE SLEEP APNEA): ICD-10-CM

## 2021-12-06 DIAGNOSIS — F17.201 TOBACCO ABUSE, IN REMISSION: ICD-10-CM

## 2021-12-06 PROBLEM — J44.89 ASTHMA-COPD OVERLAP SYNDROME: Status: ACTIVE | Noted: 2021-12-06

## 2021-12-06 PROCEDURE — 99214 OFFICE O/P EST MOD 30 MIN: CPT | Performed by: NURSE PRACTITIONER

## 2021-12-06 RX ORDER — AMLODIPINE BESYLATE 10 MG/1
5 TABLET ORAL DAILY
COMMUNITY
End: 2022-05-17 | Stop reason: SDUPTHER

## 2021-12-06 RX ORDER — BUDESONIDE 0.5 MG/2ML
0.5 INHALANT ORAL 2 TIMES DAILY
COMMUNITY
End: 2022-02-22 | Stop reason: SDUPTHER

## 2021-12-06 RX ORDER — ARFORMOTEROL TARTRATE 15 UG/2ML
SOLUTION RESPIRATORY (INHALATION)
COMMUNITY
End: 2022-02-22 | Stop reason: SDUPTHER

## 2021-12-06 RX ORDER — ALBUTEROL SULFATE 90 UG/1
2 AEROSOL, METERED RESPIRATORY (INHALATION) EVERY 4 HOURS PRN
Qty: 54 G | Refills: 3 | Status: SHIPPED | OUTPATIENT
Start: 2021-12-06 | End: 2022-02-22 | Stop reason: SDUPTHER

## 2021-12-06 RX ORDER — TIOTROPIUM BROMIDE INHALATION SPRAY 3.12 UG/1
2 SPRAY, METERED RESPIRATORY (INHALATION)
Qty: 3 EACH | Refills: 3 | Status: SHIPPED | OUTPATIENT
Start: 2021-12-06 | End: 2022-05-17 | Stop reason: SDUPTHER

## 2021-12-06 RX ORDER — ALBUTEROL SULFATE 90 UG/1
2 AEROSOL, METERED RESPIRATORY (INHALATION) EVERY 4 HOURS PRN
COMMUNITY
End: 2021-12-06 | Stop reason: SDUPTHER

## 2021-12-07 ENCOUNTER — TRANSCRIBE ORDERS (OUTPATIENT)
Dept: LAB | Facility: HOSPITAL | Age: 86
End: 2021-12-07

## 2021-12-07 DIAGNOSIS — Z79.899 ENCOUNTER FOR LONG-TERM (CURRENT) USE OF OTHER MEDICATIONS: Primary | ICD-10-CM

## 2021-12-15 ENCOUNTER — TELEPHONE (OUTPATIENT)
Dept: PULMONOLOGY | Facility: CLINIC | Age: 86
End: 2021-12-15

## 2021-12-20 ENCOUNTER — LAB (OUTPATIENT)
Dept: LAB | Facility: HOSPITAL | Age: 86
End: 2021-12-20

## 2021-12-20 DIAGNOSIS — Z79.899 ENCOUNTER FOR LONG-TERM (CURRENT) USE OF OTHER MEDICATIONS: ICD-10-CM

## 2021-12-20 LAB
ALBUMIN SERPL-MCNC: 4.4 G/DL (ref 3.5–5.2)
ALBUMIN/GLOB SERPL: 1.7 G/DL
ALP SERPL-CCNC: 77 U/L (ref 39–117)
ALT SERPL W P-5'-P-CCNC: 9 U/L (ref 1–33)
ANION GAP SERPL CALCULATED.3IONS-SCNC: 11 MMOL/L (ref 5–15)
AST SERPL-CCNC: 9 U/L (ref 1–32)
BASOPHILS # BLD AUTO: 0.03 10*3/MM3 (ref 0–0.2)
BASOPHILS NFR BLD AUTO: 0.7 % (ref 0–1.5)
BILIRUB SERPL-MCNC: 0.3 MG/DL (ref 0–1.2)
BUN SERPL-MCNC: 16 MG/DL (ref 8–23)
BUN/CREAT SERPL: 18.2 (ref 7–25)
CALCIUM SPEC-SCNC: 9.4 MG/DL (ref 8.6–10.5)
CHLORIDE SERPL-SCNC: 104 MMOL/L (ref 98–107)
CO2 SERPL-SCNC: 25 MMOL/L (ref 22–29)
CREAT SERPL-MCNC: 0.88 MG/DL (ref 0.57–1)
DEPRECATED RDW RBC AUTO: 52.7 FL (ref 37–54)
EOSINOPHIL # BLD AUTO: 0.21 10*3/MM3 (ref 0–0.4)
EOSINOPHIL NFR BLD AUTO: 5.1 % (ref 0.3–6.2)
ERYTHROCYTE [DISTWIDTH] IN BLOOD BY AUTOMATED COUNT: 15.5 % (ref 12.3–15.4)
ERYTHROCYTE [SEDIMENTATION RATE] IN BLOOD: 12 MM/HR (ref 0–30)
GFR SERPL CREATININE-BSD FRML MDRD: 74 ML/MIN/1.73
GLOBULIN UR ELPH-MCNC: 2.6 GM/DL
GLUCOSE SERPL-MCNC: 95 MG/DL (ref 65–99)
HCT VFR BLD AUTO: 36.7 % (ref 34–46.6)
HGB BLD-MCNC: 11.8 G/DL (ref 12–15.9)
IMM GRANULOCYTES # BLD AUTO: 0.01 10*3/MM3 (ref 0–0.05)
IMM GRANULOCYTES NFR BLD AUTO: 0.2 % (ref 0–0.5)
LYMPHOCYTES # BLD AUTO: 1.38 10*3/MM3 (ref 0.7–3.1)
LYMPHOCYTES NFR BLD AUTO: 33.5 % (ref 19.6–45.3)
MCH RBC QN AUTO: 30.1 PG (ref 26.6–33)
MCHC RBC AUTO-ENTMCNC: 32.2 G/DL (ref 31.5–35.7)
MCV RBC AUTO: 93.6 FL (ref 79–97)
MONOCYTES # BLD AUTO: 0.49 10*3/MM3 (ref 0.1–0.9)
MONOCYTES NFR BLD AUTO: 11.9 % (ref 5–12)
NEUTROPHILS NFR BLD AUTO: 2 10*3/MM3 (ref 1.7–7)
NEUTROPHILS NFR BLD AUTO: 48.6 % (ref 42.7–76)
NRBC BLD AUTO-RTO: 0 /100 WBC (ref 0–0.2)
PLATELET # BLD AUTO: 244 10*3/MM3 (ref 140–450)
PMV BLD AUTO: 10.9 FL (ref 6–12)
POTASSIUM SERPL-SCNC: 4.2 MMOL/L (ref 3.5–5.2)
PROT SERPL-MCNC: 7 G/DL (ref 6–8.5)
RBC # BLD AUTO: 3.92 10*6/MM3 (ref 3.77–5.28)
SODIUM SERPL-SCNC: 140 MMOL/L (ref 136–145)
WBC NRBC COR # BLD: 4.12 10*3/MM3 (ref 3.4–10.8)

## 2021-12-20 PROCEDURE — 80053 COMPREHEN METABOLIC PANEL: CPT

## 2021-12-20 PROCEDURE — 86140 C-REACTIVE PROTEIN: CPT

## 2021-12-20 PROCEDURE — 36415 COLL VENOUS BLD VENIPUNCTURE: CPT

## 2021-12-20 PROCEDURE — 85652 RBC SED RATE AUTOMATED: CPT

## 2021-12-20 PROCEDURE — 85025 COMPLETE CBC W/AUTO DIFF WBC: CPT

## 2021-12-21 LAB — CRP SERPL-MCNC: <0.3 MG/DL (ref 0–0.5)

## 2022-01-12 ENCOUNTER — TELEPHONE (OUTPATIENT)
Dept: URGENT CARE | Facility: CLINIC | Age: 87
End: 2022-01-12

## 2022-01-12 PROCEDURE — U0004 COV-19 TEST NON-CDC HGH THRU: HCPCS | Performed by: PHYSICIAN ASSISTANT

## 2022-01-12 PROCEDURE — U0005 INFEC AGEN DETEC AMPLI PROBE: HCPCS | Performed by: PHYSICIAN ASSISTANT

## 2022-01-13 NOTE — TELEPHONE ENCOUNTER
Patient notified of positive COVID PCR results. Name and date of birth verified. Advised patient to quarantine x 10 days from symptom onset.

## 2022-02-22 ENCOUNTER — TELEPHONE (OUTPATIENT)
Dept: PULMONOLOGY | Facility: CLINIC | Age: 87
End: 2022-02-22

## 2022-02-22 DIAGNOSIS — J44.9 ASTHMA-COPD OVERLAP SYNDROME: Primary | ICD-10-CM

## 2022-02-22 RX ORDER — BUDESONIDE 0.5 MG/2ML
0.5 INHALANT ORAL 2 TIMES DAILY
Qty: 120 ML | Refills: 11 | Status: SHIPPED | OUTPATIENT
Start: 2022-02-22 | End: 2022-05-17 | Stop reason: SDUPTHER

## 2022-02-22 RX ORDER — ARFORMOTEROL TARTRATE 15 UG/2ML
15 SOLUTION RESPIRATORY (INHALATION)
Qty: 120 ML | Refills: 11 | Status: SHIPPED | OUTPATIENT
Start: 2022-02-22 | End: 2022-05-17 | Stop reason: SDUPTHER

## 2022-02-22 RX ORDER — ALBUTEROL SULFATE 90 UG/1
2 AEROSOL, METERED RESPIRATORY (INHALATION) EVERY 4 HOURS PRN
Qty: 54 G | Refills: 3 | Status: SHIPPED | OUTPATIENT
Start: 2022-02-22 | End: 2022-05-23

## 2022-03-14 ENCOUNTER — TRANSCRIBE ORDERS (OUTPATIENT)
Dept: LAB | Facility: HOSPITAL | Age: 87
End: 2022-03-14

## 2022-03-14 ENCOUNTER — LAB (OUTPATIENT)
Dept: LAB | Facility: HOSPITAL | Age: 87
End: 2022-03-14

## 2022-03-14 DIAGNOSIS — E11.00 TYPE II DIABETES MELLITUS WITH HYPEROSMOLARITY, UNCONTROLLED: ICD-10-CM

## 2022-03-14 DIAGNOSIS — I10 HYPERTENSION, ESSENTIAL: ICD-10-CM

## 2022-03-14 DIAGNOSIS — E11.65 TYPE II DIABETES MELLITUS WITH HYPEROSMOLARITY, UNCONTROLLED: ICD-10-CM

## 2022-03-14 DIAGNOSIS — E78.5 HYPERLIPIDEMIA, UNSPECIFIED HYPERLIPIDEMIA TYPE: Primary | ICD-10-CM

## 2022-03-14 DIAGNOSIS — E78.5 HYPERLIPIDEMIA, UNSPECIFIED HYPERLIPIDEMIA TYPE: ICD-10-CM

## 2022-03-14 LAB
ALBUMIN SERPL-MCNC: 4.4 G/DL (ref 3.5–5.2)
ALBUMIN UR-MCNC: 3.4 MG/DL
ALBUMIN/GLOB SERPL: 1.6 G/DL
ALP SERPL-CCNC: 107 U/L (ref 39–117)
ALT SERPL W P-5'-P-CCNC: 13 U/L (ref 1–33)
ANION GAP SERPL CALCULATED.3IONS-SCNC: 11.8 MMOL/L (ref 5–15)
AST SERPL-CCNC: 12 U/L (ref 1–32)
BASOPHILS # BLD AUTO: 0.02 10*3/MM3 (ref 0–0.2)
BASOPHILS NFR BLD AUTO: 0.5 % (ref 0–1.5)
BILIRUB SERPL-MCNC: 0.4 MG/DL (ref 0–1.2)
BUN SERPL-MCNC: 15 MG/DL (ref 8–23)
BUN/CREAT SERPL: 27.3 (ref 7–25)
CALCIUM SPEC-SCNC: 9.8 MG/DL (ref 8.6–10.5)
CHLORIDE SERPL-SCNC: 104 MMOL/L (ref 98–107)
CHOLEST SERPL-MCNC: 179 MG/DL (ref 0–200)
CO2 SERPL-SCNC: 27.2 MMOL/L (ref 22–29)
CREAT SERPL-MCNC: 0.55 MG/DL (ref 0.57–1)
DEPRECATED RDW RBC AUTO: 50.7 FL (ref 37–54)
EGFRCR SERPLBLD CKD-EPI 2021: 90.5 ML/MIN/1.73
EOSINOPHIL # BLD AUTO: 0.17 10*3/MM3 (ref 0–0.4)
EOSINOPHIL NFR BLD AUTO: 4.5 % (ref 0.3–6.2)
ERYTHROCYTE [DISTWIDTH] IN BLOOD BY AUTOMATED COUNT: 15 % (ref 12.3–15.4)
GLOBULIN UR ELPH-MCNC: 2.8 GM/DL
GLUCOSE SERPL-MCNC: 92 MG/DL (ref 65–99)
HBA1C MFR BLD: 6.9 % (ref 4.8–5.6)
HCT VFR BLD AUTO: 37 % (ref 34–46.6)
HDLC SERPL-MCNC: 59 MG/DL (ref 40–60)
HGB BLD-MCNC: 11.7 G/DL (ref 12–15.9)
IMM GRANULOCYTES # BLD AUTO: 0.01 10*3/MM3 (ref 0–0.05)
IMM GRANULOCYTES NFR BLD AUTO: 0.3 % (ref 0–0.5)
LDLC SERPL CALC-MCNC: 108 MG/DL (ref 0–100)
LDLC/HDLC SERPL: 1.83 {RATIO}
LYMPHOCYTES # BLD AUTO: 1.05 10*3/MM3 (ref 0.7–3.1)
LYMPHOCYTES NFR BLD AUTO: 28 % (ref 19.6–45.3)
MCH RBC QN AUTO: 29.8 PG (ref 26.6–33)
MCHC RBC AUTO-ENTMCNC: 31.6 G/DL (ref 31.5–35.7)
MCV RBC AUTO: 94.4 FL (ref 79–97)
MONOCYTES # BLD AUTO: 0.41 10*3/MM3 (ref 0.1–0.9)
MONOCYTES NFR BLD AUTO: 10.9 % (ref 5–12)
NEUTROPHILS NFR BLD AUTO: 2.09 10*3/MM3 (ref 1.7–7)
NEUTROPHILS NFR BLD AUTO: 55.8 % (ref 42.7–76)
NRBC BLD AUTO-RTO: 0 /100 WBC (ref 0–0.2)
PLATELET # BLD AUTO: 238 10*3/MM3 (ref 140–450)
PMV BLD AUTO: 10.7 FL (ref 6–12)
POTASSIUM SERPL-SCNC: 4 MMOL/L (ref 3.5–5.2)
PROT SERPL-MCNC: 7.2 G/DL (ref 6–8.5)
RBC # BLD AUTO: 3.92 10*6/MM3 (ref 3.77–5.28)
SODIUM SERPL-SCNC: 143 MMOL/L (ref 136–145)
TRIGL SERPL-MCNC: 61 MG/DL (ref 0–150)
TSH SERPL DL<=0.05 MIU/L-ACNC: 1.2 UIU/ML (ref 0.27–4.2)
VLDLC SERPL-MCNC: 12 MG/DL (ref 5–40)
WBC NRBC COR # BLD: 3.75 10*3/MM3 (ref 3.4–10.8)

## 2022-03-14 PROCEDURE — 85025 COMPLETE CBC W/AUTO DIFF WBC: CPT

## 2022-03-14 PROCEDURE — 82043 UR ALBUMIN QUANTITATIVE: CPT

## 2022-03-14 PROCEDURE — 83036 HEMOGLOBIN GLYCOSYLATED A1C: CPT

## 2022-03-14 PROCEDURE — 36415 COLL VENOUS BLD VENIPUNCTURE: CPT

## 2022-03-14 PROCEDURE — 80061 LIPID PANEL: CPT

## 2022-03-14 PROCEDURE — 84443 ASSAY THYROID STIM HORMONE: CPT

## 2022-03-14 PROCEDURE — 80053 COMPREHEN METABOLIC PANEL: CPT

## 2022-05-17 ENCOUNTER — HOSPITAL ENCOUNTER (OUTPATIENT)
Dept: GENERAL RADIOLOGY | Facility: HOSPITAL | Age: 87
Discharge: HOME | End: 2022-05-17
Admitting: INTERNAL MEDICINE

## 2022-05-17 ENCOUNTER — OFFICE VISIT (OUTPATIENT)
Dept: PULMONOLOGY | Facility: CLINIC | Age: 87
End: 2022-05-17

## 2022-05-17 VITALS
OXYGEN SATURATION: 100 % | DIASTOLIC BLOOD PRESSURE: 69 MMHG | WEIGHT: 122.6 LBS | HEIGHT: 63 IN | HEART RATE: 72 BPM | SYSTOLIC BLOOD PRESSURE: 152 MMHG | RESPIRATION RATE: 16 BRPM | BODY MASS INDEX: 21.72 KG/M2 | TEMPERATURE: 97.3 F

## 2022-05-17 DIAGNOSIS — G47.33 OSA (OBSTRUCTIVE SLEEP APNEA): ICD-10-CM

## 2022-05-17 DIAGNOSIS — J44.9 ASTHMA-COPD OVERLAP SYNDROME: ICD-10-CM

## 2022-05-17 DIAGNOSIS — M06.9 RHEUMATOID ARTHRITIS, INVOLVING UNSPECIFIED SITE, UNSPECIFIED WHETHER RHEUMATOID FACTOR PRESENT: ICD-10-CM

## 2022-05-17 DIAGNOSIS — F17.201 TOBACCO ABUSE, IN REMISSION: ICD-10-CM

## 2022-05-17 DIAGNOSIS — K21.9 GASTROESOPHAGEAL REFLUX DISEASE, UNSPECIFIED WHETHER ESOPHAGITIS PRESENT: Primary | ICD-10-CM

## 2022-05-17 PROCEDURE — 71046 X-RAY EXAM CHEST 2 VIEWS: CPT

## 2022-05-17 PROCEDURE — 99214 OFFICE O/P EST MOD 30 MIN: CPT | Performed by: INTERNAL MEDICINE

## 2022-05-17 RX ORDER — TIOTROPIUM BROMIDE INHALATION SPRAY 3.12 UG/1
2 SPRAY, METERED RESPIRATORY (INHALATION)
Qty: 12 G | Refills: 0 | Status: SHIPPED | OUTPATIENT
Start: 2022-05-17 | End: 2022-11-30 | Stop reason: SDUPTHER

## 2022-05-17 RX ORDER — FOLIC ACID 1 MG/1
1 TABLET ORAL DAILY
COMMUNITY
Start: 2021-12-07

## 2022-05-17 RX ORDER — ARFORMOTEROL TARTRATE 15 UG/2ML
15 SOLUTION RESPIRATORY (INHALATION)
Qty: 120 ML | Refills: 11 | Status: SHIPPED | OUTPATIENT
Start: 2022-05-17 | End: 2022-11-30 | Stop reason: SDUPTHER

## 2022-05-17 RX ORDER — HYDRALAZINE HYDROCHLORIDE 25 MG/1
TABLET, FILM COATED ORAL
COMMUNITY
Start: 2022-04-25

## 2022-05-17 RX ORDER — AMLODIPINE BESYLATE 5 MG/1
TABLET ORAL
COMMUNITY
Start: 2022-04-25

## 2022-05-17 RX ORDER — BUDESONIDE 0.5 MG/2ML
0.5 INHALANT ORAL 2 TIMES DAILY
Qty: 120 ML | Refills: 11 | Status: SHIPPED | OUTPATIENT
Start: 2022-05-17 | End: 2022-11-30 | Stop reason: SDUPTHER

## 2022-05-17 NOTE — PROGRESS NOTES
Primary Care Provider  Arias Beck MD     Referring Provider  No ref. provider found     Chief Complaint  COPD and Follow-up    Subjective          Annita Rodriguez presents to [facility]  History of Present Illness  Annita Rodriguez is a 86 y.o. female patient with history of asthma and COPD overlap syndrome, obstructive sleep apnea, gastroesophageal reflux disease and rheumatoid arthritis.  Patient has been on Brovana, Pulmicort and Spiriva for maintenance inhalers.  She is also on albuterol.  Patient continues to use CPAP with sleep.  She is also on Protonix daily.  She has been compliant with her medications including Brovana and Pulmicort which she uses twice daily and Spiriva inhaler once daily.  Uses rescue inhaler once or twice a week.  Has not needed any antibiotics or steroids since her last office visit.  She has not needed to be in hospital for problem breathing.  She is up-to-date on vaccines including COVID, flu and pneumonia vaccine.  She has not taken booster dose otherwise.  Her primary care provider has recommended booster dose in fall.  She has no change in weight or appetite.  She was using her CPAP with sleep apnea recently.  Since it has been in recall, she has not used it.  She is in a recall list and has not heard anything about when she received the machine.  She has minimal cough, no significant phlegm.  Has no changes in weight or appetite.  No nausea or vomiting.    Review of Systems     General: Fatigue, No Fever No weight loss or loss of appetite  HEENT: No dysphagia, No Visual Changes, no rhinorrhea  Respiratory:  + cough,+Dyspnea, no phlegm, No Pleuritic Pain, no wheezing, no hemoptysis.  Cardiovascular: Denies chest pain, denies palpitations,+[place], No Chest Pressure  Gastrointestinal:  No Abdominal Pain, No Nausea, No Vomiting, No Diarrhea  Genitourinary:  No Dysuria, No Frequency, No Hesitancy  Musculoskeletal: No  muscle pain or swelling  Endocrine:  No Heat Intolerance, No Cold Intolerance  Hematologic:  No Bleeding, No Bruising  Psychiatric:  No Anxiety, No Depression  Neurologic:  No Confusion, no Dysarthria, No Headaches  Skin:  No Rash, No Open Wounds    Family History   Problem Relation Age of Onset   • Stroke Father    • Heart disease Father    • Diabetes Father         Social History     Socioeconomic History   • Marital status:    Tobacco Use   • Smoking status: Former Smoker     Packs/day: 0.25     Years: 15.00     Pack years: 3.75     Types: Cigarettes   • Smokeless tobacco: Never Used   Vaping Use   • Vaping Use: Never used   Substance and Sexual Activity   • Alcohol use: Never   • Drug use: Never   • Sexual activity: Not Currently        Past Medical History:   Diagnosis Date   • Arthritis    • Bowel obstruction (HCC)    • Diabetes mellitus (HCC)    • Hypertension    • Tinnitus         Immunization History   Administered Date(s) Administered   • COVID-19 (PFIZER) PURPLE CAP 03/08/2021, 03/29/2021   • Fluzone High Dose =>65 Years ([employer] ONLY) 09/17/2020   • Pneumococcal Polysaccharide (PPSV23) 10/20/2003   • Td 12/06/2001         Allergies   Allergen Reactions   • Sulfa Antibiotics Nausea And Vomiting          Current Outpatient Medications:   •  albuterol sulfate  (90 Base) MCG/ACT inhaler, Inhale 2 puffs Every 4 (Four) Hours As Needed for Wheezing or Shortness of Air for up to 90 days., Disp: 54 g, Rfl: 3  •  amLODIPine (NORVASC) 5 MG tablet, , Disp: , Rfl:   •  arformoterol (BROVANA) 15 MCG/2ML nebulizer solution, Take 2 mL by nebulization 2 (Two) Times a Day., Disp: 120 mL, Rfl: 11  •  budesonide (PULMICORT) 0.5 MG/2ML nebulizer solution, Take 2 mL by nebulization 2 (Two) Times a Day., Disp: 120 mL, Rfl: 11  •  hydrALAZINE (APRESOLINE) 25 MG tablet, , Disp: , Rfl:   •  losartan (COZAAR) 50 MG tablet, Take 1 tablet by mouth Daily. (Patient taking differently: Take 100 mg by mouth Daily.), Disp:  "30 tablet, Rfl: 0  •  metFORMIN (GLUCOPHAGE) 500 MG tablet, Take 500 mg by mouth 2 (Two) Times a Day With Meals., Disp: , Rfl:   •  Spiriva Respimat 2.5 MCG/ACT aerosol solution inhaler, Inhale 2 puffs Daily for 90 days., Disp: 12 g, Rfl: 0  •  folic acid (FOLVITE) 1 MG tablet, Take 1 tablet by mouth Daily., Disp: , Rfl:   •  hydroxychloroquine (PLAQUENIL) 200 MG tablet, Take 200 mg by mouth 2 (Two) Times a Day., Disp: , Rfl:   •  Levemir FlexTouch 100 UNIT/ML injection, Inject 6 Units under the skin into the appropriate area as directed Every Night., Disp: , Rfl:   •  methotrexate 2.5 MG tablet, Take 25 mg by mouth 1 (One) Time Per Week., Disp: , Rfl:   •  metoprolol tartrate (LOPRESSOR) 25 MG tablet, Take 0.5 tablets by mouth 2 (Two) Times a Day for 30 days., Disp: 30 tablet, Rfl: 0  •  Pramipexole Dihydrochloride (MIRAPEX PO), Take  by mouth 1 (One) Time Per Week., Disp: , Rfl:      Objective   Vital Signs:   /69 (BP Location: Left arm, Patient Position: Sitting, Cuff Size: Adult)   Pulse 72   Temp 97.3 °F (36.3 °C) (Tympanic)   Resp 16   Ht 160 cm (63\")   Wt 55.6 kg (122 lb 9.6 oz)   SpO2 100% Comment: room air  BMI 21.72 kg/m²       Mallampatti classification : 1  Physical Exam  Vital Signs Reviewed  WDWN, Alert, NAD, pleasant elderly female, normal conversational  HEENT:  PERRL, EOMI.  OP, nares clear, no sinus tenderness  Neck:  Supple, no JVD, no thyromegaly  Lymph: no axillary, cervical, supraclavicular lymphadenopathy noted bilaterally  Chest:  good aeration, bilateral diminished breath sounds, no wheezing, crackles or rhonchi, resonant to percussion b/l  CV: RRR, no MGR, pulses 2+, equal.  Abd:  Soft, NT, ND, + BS, no HSM  EXT:  no clubbing, no cyanosis, No BLE edema  Neuro:  A&Ox3, CN grossly intact, no focal deficits.  Skin: No rashes or lesions noted     Result Review :   The following data was reviewed by: Golden Bill MD on 05/17/2022:  Common labs    Common Labsle 10/11/21 10/11/21 " 12/20/21 12/20/21 3/14/22 3/14/22 3/14/22 3/14/22 3/14/22    0440 0440 1425 1425 1015 1015 1015 1015 1015   Glucose 90   95   92     BUN 5 (A)   16   15     Creatinine 0.47 (A)   0.88   0.55 (A)     eGFR  Am >150   74        Sodium 141   140   143     Potassium 3.3 (A)   4.2   4.0     Chloride 101   104   104     Calcium 8.5 (A)   9.4   9.8     Albumin    4.40   4.40     Total Bilirubin    0.3   0.4     Alkaline Phosphatase    77   107     AST (SGOT)    9   12     ALT (SGPT)    9   13     WBC  3.25 (A) 4.12  3.75       Hemoglobin  11.4 (A) 11.8 (A)  11.7 (A)       Hematocrit  33.8 (A) 36.7  37.0       Platelets  213 244  238       Total Cholesterol        179    Triglycerides        61    HDL Cholesterol        59    LDL Cholesterol         108 (A)    Hemoglobin A1C         6.90 (A)   Microalbumin, Urine      3.4      (A) Abnormal value            CMP    CMP 10/11/21 12/20/21 3/14/22   Glucose 90 95 92   BUN 5 (A) 16 15   Creatinine 0.47 (A) 0.88 0.55 (A)   eGFR African Am >150 74    Sodium 141 140 143   Potassium 3.3 (A) 4.2 4.0   Chloride 101 104 104   Calcium 8.5 (A) 9.4 9.8   Albumin  4.40 4.40   Total Bilirubin  0.3 0.4   Alkaline Phosphatase  77 107   AST (SGOT)  9 12   ALT (SGPT)  9 13   (A) Abnormal value            CBC    CBC 10/11/21 12/20/21 3/14/22   WBC 3.25 (A) 4.12 3.75   RBC 3.69 (A) 3.92 3.92   Hemoglobin 11.4 (A) 11.8 (A) 11.7 (A)   Hematocrit 33.8 (A) 36.7 37.0   MCV 91.6 93.6 94.4   MCH 30.9 30.1 29.8   MCHC 33.7 32.2 31.6   RDW 14.2 15.5 (A) 15.0   Platelets 213 244 238   (A) Abnormal value            CBC w/diff    CBC w/Diff 10/11/21 12/20/21 3/14/22   WBC 3.25 (A) 4.12 3.75   RBC 3.69 (A) 3.92 3.92   Hemoglobin 11.4 (A) 11.8 (A) 11.7 (A)   Hematocrit 33.8 (A) 36.7 37.0   MCV 91.6 93.6 94.4   MCH 30.9 30.1 29.8   MCHC 33.7 32.2 31.6   RDW 14.2 15.5 (A) 15.0   Platelets 213 244 238   Neutrophil Rel %  48.6 55.8   Immature Granulocyte Rel %  0.2 0.3   Lymphocyte Rel %  33.5 28.0   Monocyte  Rel %  11.9 10.9   Eosinophil Rel %  5.1 4.5   Basophil Rel %  0.7 0.5   (A) Abnormal value            Data reviewed: Radiologic studies Previous chest x-ray from 2019 was stable.            Assessment and Plan    Diagnoses and all orders for this visit:    1. Gastroesophageal reflux disease, unspecified whether esophagitis present (Primary)    2. Rheumatoid arthritis, involving unspecified site, unspecified whether rheumatoid factor present (HCC)    3. Asthma-COPD overlap syndrome (HCC)  -     XR Chest 2 View; Future  -     Spiriva Respimat 2.5 MCG/ACT aerosol solution inhaler; Inhale 2 puffs Daily for 90 days.  Dispense: 12 g; Refill: 0  -     budesonide (PULMICORT) 0.5 MG/2ML nebulizer solution; Take 2 mL by nebulization 2 (Two) Times a Day.  Dispense: 120 mL; Refill: 11  -     arformoterol (BROVANA) 15 MCG/2ML nebulizer solution; Take 2 mL by nebulization 2 (Two) Times a Day.  Dispense: 120 mL; Refill: 11    4. BRANDY (obstructive sleep apnea)  -     XR Chest 2 View; Future    5. Tobacco abuse, in remission  -     XR Chest 2 View; Future      COPD/asthma overlap: Continue with Brovana and Pulmicort nebulizers every 12 hours.  Continue with Spiriva Respimat 2 puffs once daily.  Use albuterol as needed.  COPD exacerbation action plan was discussed in detail.  Patient has not needed any antibiotics or steroids since her last office visit.  She is up-to-date on her flu, pneumonia vaccines.  She has had 2 COVID vaccines but is waiting on her booster dose.    Gastroesophageal flux disease: Continue with Protonix 40 mg once daily.    Rheumatoid arthritis: Currently on methotrexate per Dr. Gutierrez.  Repeat chest x-ray with history of asthma and COPD overlap with rheumatoid arthritis.    Obstructive sleep apnea: Her CPAP machine is in recall list.  I discussed with her regarding the risks of not using CPAP.  The risk of not using CPAP is higher than the benefit of not using it with a recall in place.  Sleep hygiene was  discussed in detail.  Weight loss counseling was done.    Follow Up   Return in about 6 months (around 11/17/2022).  Patient was given instructions and counseling regarding her condition or for health maintenance advice. Please see specific information pulled into the AVS if appropriate.       Electronically signed by Golden Bill MD, 5/17/2022, 10:24 EDT.

## 2022-06-02 ENCOUNTER — LAB (OUTPATIENT)
Dept: LAB | Facility: HOSPITAL | Age: 87
End: 2022-06-02

## 2022-06-02 ENCOUNTER — TRANSCRIBE ORDERS (OUTPATIENT)
Dept: LAB | Facility: HOSPITAL | Age: 87
End: 2022-06-02

## 2022-06-02 DIAGNOSIS — E11.65 TYPE II DIABETES MELLITUS WITH HYPEROSMOLARITY, UNCONTROLLED: ICD-10-CM

## 2022-06-02 DIAGNOSIS — E11.00 TYPE II DIABETES MELLITUS WITH HYPEROSMOLARITY, UNCONTROLLED: Primary | ICD-10-CM

## 2022-06-02 DIAGNOSIS — E11.00 TYPE II DIABETES MELLITUS WITH HYPEROSMOLARITY, UNCONTROLLED: ICD-10-CM

## 2022-06-02 DIAGNOSIS — E11.65 TYPE II DIABETES MELLITUS WITH HYPEROSMOLARITY, UNCONTROLLED: Primary | ICD-10-CM

## 2022-06-02 LAB
ALBUMIN UR-MCNC: 3.1 MG/DL
HBA1C MFR BLD: 6.5 % (ref 4.8–5.6)

## 2022-06-02 PROCEDURE — 82043 UR ALBUMIN QUANTITATIVE: CPT

## 2022-06-02 PROCEDURE — 36415 COLL VENOUS BLD VENIPUNCTURE: CPT

## 2022-06-02 PROCEDURE — 83036 HEMOGLOBIN GLYCOSYLATED A1C: CPT

## 2022-11-30 ENCOUNTER — OFFICE VISIT (OUTPATIENT)
Dept: PULMONOLOGY | Facility: CLINIC | Age: 87
End: 2022-11-30

## 2022-11-30 VITALS
OXYGEN SATURATION: 100 % | TEMPERATURE: 97.3 F | DIASTOLIC BLOOD PRESSURE: 75 MMHG | HEIGHT: 63 IN | HEART RATE: 82 BPM | RESPIRATION RATE: 16 BRPM | SYSTOLIC BLOOD PRESSURE: 152 MMHG | BODY MASS INDEX: 21.3 KG/M2 | WEIGHT: 120.2 LBS

## 2022-11-30 DIAGNOSIS — F17.201 TOBACCO ABUSE, IN REMISSION: ICD-10-CM

## 2022-11-30 DIAGNOSIS — G47.33 OSA (OBSTRUCTIVE SLEEP APNEA): Primary | ICD-10-CM

## 2022-11-30 DIAGNOSIS — M06.9 RHEUMATOID ARTHRITIS, INVOLVING UNSPECIFIED SITE, UNSPECIFIED WHETHER RHEUMATOID FACTOR PRESENT: ICD-10-CM

## 2022-11-30 DIAGNOSIS — J44.9 ASTHMA-COPD OVERLAP SYNDROME: ICD-10-CM

## 2022-11-30 DIAGNOSIS — K21.9 GASTROESOPHAGEAL REFLUX DISEASE, UNSPECIFIED WHETHER ESOPHAGITIS PRESENT: ICD-10-CM

## 2022-11-30 PROCEDURE — 99214 OFFICE O/P EST MOD 30 MIN: CPT | Performed by: INTERNAL MEDICINE

## 2022-11-30 RX ORDER — CEPHALEXIN 500 MG/1
CAPSULE ORAL
COMMUNITY
Start: 2022-11-21

## 2022-11-30 RX ORDER — BUDESONIDE 0.5 MG/2ML
0.5 INHALANT ORAL 2 TIMES DAILY
Qty: 120 ML | Refills: 11 | Status: SHIPPED | OUTPATIENT
Start: 2022-11-30 | End: 2023-01-23 | Stop reason: SDUPTHER

## 2022-11-30 RX ORDER — TIOTROPIUM BROMIDE INHALATION SPRAY 3.12 UG/1
2 SPRAY, METERED RESPIRATORY (INHALATION)
Qty: 2 EACH | Refills: 0 | COMMUNITY
Start: 2022-11-30 | End: 2022-12-01

## 2022-11-30 RX ORDER — ARFORMOTEROL TARTRATE 15 UG/2ML
15 SOLUTION RESPIRATORY (INHALATION)
Qty: 120 ML | Refills: 11 | Status: SHIPPED | OUTPATIENT
Start: 2022-11-30 | End: 2023-01-23 | Stop reason: SDUPTHER

## 2022-11-30 RX ORDER — TIOTROPIUM BROMIDE INHALATION SPRAY 3.12 UG/1
2 SPRAY, METERED RESPIRATORY (INHALATION)
Qty: 12 G | Refills: 0 | Status: SHIPPED | OUTPATIENT
Start: 2022-11-30 | End: 2023-02-28

## 2022-11-30 NOTE — PROGRESS NOTES
Primary Care Provider  Arias Beck MD     Referring Provider  No ref. provider found     Chief Complaint  COPD, Asthma, and Follow-up    Subjective          Annita Rodriguez presents to [facility] PULMONARY & CRITICAL CARE MEDICINE  History of Present Illness  Annita Rodriguez is a 87 y.o. female patient with history of asthma and COPD overlap syndrome, obstructive sleep apnea, gastroesophageal reflux disease and rheumatoid arthritis.  Patient has been on Brovana, Pulmicort and Spiriva as triple nebulizer and inhaler therapies.  She is also on albuterol.  Patient continues to use CPAP with sleep.  She is also on Protonix daily.  She has been compliant with her medications including Brovana and Pulmicort which she uses twice daily and Spiriva inhaler once daily.  Uses rescue inhaler once or twice a week.  Has not needed any antibiotics or steroids since her last office visit.  She has not needed to be in hospital for problem breathing.  She is up-to-date on vaccines including COVID, flu and pneumonia vaccine.  She has not taken booster dose otherwise. She has no change in weight or appetite.  She has not been using her CPAP machine with it being on recall.  She has minimal cough, no significant phlegm.  Has no changes in weight or appetite.  No nausea or vomiting.  She had joint pains and recently had steroid shots for arthritis through rheumatology service.    Review of Systems     General: Fatigue, No Fever No weight loss or loss of appetite  HEENT: No dysphagia, No Visual Changes, no rhinorrhea  Respiratory:  + cough,+Dyspnea, no phlegm, No Pleuritic Pain, no wheezing, no hemoptysis.  Cardiovascular: Denies chest pain, denies palpitations,+[place], No Chest Pressure  Gastrointestinal:  No Abdominal Pain, No Nausea, No Vomiting, No Diarrhea  Genitourinary:  No Dysuria, No Frequency, No Hesitancy  Musculoskeletal: No muscle pain or swelling  Endocrine:  No Heat Intolerance, No Cold  Intolerance  Hematologic:  No Bleeding, No Bruising  Psychiatric:  No Anxiety, No Depression  Neurologic:  No Confusion, no Dysarthria, No Headaches  Skin:  No Rash, No Open Wounds    Family History   Problem Relation Age of Onset   • Stroke Father    • Heart disease Father    • Diabetes Father         Social History     Socioeconomic History   • Marital status:    Tobacco Use   • Smoking status: Former     Packs/day: 0.25     Years: 15.00     Pack years: 3.75     Types: Cigarettes   • Smokeless tobacco: Never   Vaping Use   • Vaping Use: Never used   Substance and Sexual Activity   • Alcohol use: Never   • Drug use: Never   • Sexual activity: Not Currently        Past Medical History:   Diagnosis Date   • Arthritis    • Bowel obstruction (HCC)    • Diabetes mellitus (HCC)    • Hypertension    • Tinnitus         Immunization History   Administered Date(s) Administered   • COVID-19 (PFIZER) BIVALENT BOOSTER 12+YRS 11/22/2022   • COVID-19 (PFIZER) PURPLE CAP 03/08/2021, 03/29/2021   • Flu Vaccine Intradermal Quad 18-64YR 10/05/2022   • Fluzone High Dose =>65 Years ([employer] ONLY) 09/17/2020   • Pneumococcal Polysaccharide (PPSV23) 10/20/2003   • Td 12/06/2001         Allergies   Allergen Reactions   • Sulfa Antibiotics Nausea And Vomiting          Current Outpatient Medications:   •  amLODIPine (NORVASC) 5 MG tablet, , Disp: , Rfl:   •  arformoterol (BROVANA) 15 MCG/2ML nebulizer solution, Take 2 mL by nebulization 2 (Two) Times a Day., Disp: 120 mL, Rfl: 11  •  budesonide (PULMICORT) 0.5 MG/2ML nebulizer solution, Take 2 mL by nebulization 2 (Two) Times a Day., Disp: 120 mL, Rfl: 11  •  cephalexin (KEFLEX) 500 MG capsule, , Disp: , Rfl:   •  folic acid (FOLVITE) 1 MG tablet, Take 1 tablet by mouth Daily., Disp: , Rfl:   •  hydrALAZINE (APRESOLINE) 25 MG tablet, , Disp: , Rfl:   •  hydroxychloroquine (PLAQUENIL) 200 MG tablet, Take 200 mg by mouth 2 (Two) Times a Day., Disp: , Rfl:   •  Levemir FlexTouch 100  "UNIT/ML injection, Inject 6 Units under the skin into the appropriate area as directed Every Night., Disp: , Rfl:   •  losartan (COZAAR) 50 MG tablet, Take 1 tablet by mouth Daily. (Patient taking differently: Take 100 mg by mouth Daily.), Disp: 30 tablet, Rfl: 0  •  metFORMIN (GLUCOPHAGE) 500 MG tablet, Take 500 mg by mouth 2 (Two) Times a Day With Meals., Disp: , Rfl:   •  methotrexate 2.5 MG tablet, Take 25 mg by mouth 1 (One) Time Per Week., Disp: , Rfl:   •  Pramipexole Dihydrochloride (MIRAPEX PO), Take  by mouth 1 (One) Time Per Week., Disp: , Rfl:   •  Spiriva Respimat 2.5 MCG/ACT aerosol solution inhaler, Inhale 2 puffs Daily for 90 days., Disp: 12 g, Rfl: 0  •  metoprolol tartrate (LOPRESSOR) 25 MG tablet, Take 0.5 tablets by mouth 2 (Two) Times a Day for 30 days., Disp: 30 tablet, Rfl: 0     Objective   Vital Signs:   /75 (BP Location: Left arm, Patient Position: Sitting, Cuff Size: Adult)   Pulse 82   Temp 97.3 °F (36.3 °C) (Tympanic)   Resp 16   Ht 160 cm (63\")   Wt 54.5 kg (120 lb 3.2 oz)   SpO2 100% Comment: room air  BMI 21.29 kg/m²       Mallampatti classification : 1  Physical Exam  Vital Signs Reviewed  WDWN, Alert, NAD, pleasant elderly female, normal conversational  HEENT:  PERRL, EOMI.  OP, nares clear, no sinus tenderness  Neck:  Supple, no JVD, no thyromegaly  Lymph: no axillary, cervical, supraclavicular lymphadenopathy noted bilaterally  Chest:  good aeration, bilateral diminished breath sounds, no wheezing, crackles or rhonchi, resonant to percussion b/l  CV: RRR, no MGR, pulses 2+, equal.  Abd:  Soft, NT, ND, + BS, no HSM  EXT:  no clubbing, no cyanosis, No BLE edema  Neuro:  A&Ox3, CN grossly intact, no focal deficits  Skin: No rashes or lesions noted     Result Review :   The following data was reviewed by: Gloden Bill MD on 05/17/2022:  Common labs    Common Labs 12/20/21 12/20/21 3/14/22 3/14/22 3/14/22 3/14/22 3/14/22 6/2/22 6/2/22    1425 1425 1015 1015 1015 1015 " 1015 1025 1315   Glucose  95   92       BUN  16   15       Creatinine  0.88   0.55 (A)       eGFR African Am  74          Sodium  140   143       Potassium  4.2   4.0       Chloride  104   104       Calcium  9.4   9.8       Albumin  4.40   4.40       Total Bilirubin  0.3   0.4       Alkaline Phosphatase  77   107       AST (SGOT)  9   12       ALT (SGPT)  9   13       WBC 4.12  3.75         Hemoglobin 11.8 (A)  11.7 (A)         Hematocrit 36.7  37.0         Platelets 244  238         Total Cholesterol      179      Triglycerides      61      HDL Cholesterol      59      LDL Cholesterol       108 (A)      Hemoglobin A1C       6.90 (A) 6.50 (A)    Microalbumin, Urine    3.4     3.1   (A) Abnormal value            CMP    CMP 12/20/21 3/14/22   Glucose 95 92   BUN 16 15   Creatinine 0.88 0.55 (A)   eGFR African Am 74    Sodium 140 143   Potassium 4.2 4.0   Chloride 104 104   Calcium 9.4 9.8   Albumin 4.40 4.40   Total Bilirubin 0.3 0.4   Alkaline Phosphatase 77 107   AST (SGOT) 9 12   ALT (SGPT) 9 13   (A) Abnormal value            CBC    CBC 12/20/21 3/14/22   WBC 4.12 3.75   RBC 3.92 3.92   Hemoglobin 11.8 (A) 11.7 (A)   Hematocrit 36.7 37.0   MCV 93.6 94.4   MCH 30.1 29.8   MCHC 32.2 31.6   RDW 15.5 (A) 15.0   Platelets 244 238   (A) Abnormal value            CBC w/diff    CBC w/Diff 10/11/21 12/20/21 3/14/22   WBC 3.25 (A) 4.12 3.75   RBC 3.69 (A) 3.92 3.92   Hemoglobin 11.4 (A) 11.8 (A) 11.7 (A)   Hematocrit 33.8 (A) 36.7 37.0   MCV 91.6 93.6 94.4   MCH 30.9 30.1 29.8   MCHC 33.7 32.2 31.6   RDW 14.2 15.5 (A) 15.0   Platelets 213 244 238   Neutrophil Rel %  48.6 55.8   Immature Granulocyte Rel %  0.2 0.3   Lymphocyte Rel %  33.5 28.0   Monocyte Rel %  11.9 10.9   Eosinophil Rel %  5.1 4.5   Basophil Rel %  0.7 0.5   (A) Abnormal value            Data reviewed: Radiologic studies Previous chest x-ray from 2019 was stable.            Assessment and Plan    Diagnoses and all orders for this visit:    1. BRANDY  (obstructive sleep apnea) (Primary)    2. Asthma-COPD overlap syndrome (HCC)  -     Spiriva Respimat 2.5 MCG/ACT aerosol solution inhaler; Inhale 2 puffs Daily for 90 days.  Dispense: 12 g; Refill: 0  -     budesonide (PULMICORT) 0.5 MG/2ML nebulizer solution; Take 2 mL by nebulization 2 (Two) Times a Day.  Dispense: 120 mL; Refill: 11  -     arformoterol (BROVANA) 15 MCG/2ML nebulizer solution; Take 2 mL by nebulization 2 (Two) Times a Day.  Dispense: 120 mL; Refill: 11    3. Tobacco abuse, in remission    4. Rheumatoid arthritis, involving unspecified site, unspecified whether rheumatoid factor present (HCC)    5. Gastroesophageal reflux disease, unspecified whether esophagitis present      COPD/asthma overlap: Continue with Brovana and Pulmicort nebulizers every 12 hours.  Continue with Spiriva Respimat 2 puffs once daily.  Use albuterol as needed.  COPD exacerbation action plan was discussed in detail.  Patient has not needed any antibiotics or steroids since her last office visit.  She is up-to-date on her flu, pneumonia vaccines.  She has had 2 COVID vaccines but is waiting on her booster dose.    Gastroesophageal flux disease: Continue with Protonix 40 mg once daily.    Rheumatoid arthritis: Currently on methotrexate per Dr. Gutierrez.  Recently had steroid shot for her arthritis in her knee and shoulder.    Obstructive sleep apnea: Waiting for her new machine to arrive, her old machine has been on recall list.  Sleep hygiene was discussed in detail.  Weight loss counseling was done.    She is up-to-date on vaccines including COVID-19 vaccines with booster dose, pneumonia vaccine.  She had flu shot through her primary care provider.    Follow Up   Return in about 6 months (around 5/30/2023).  Patient was given instructions and counseling regarding her condition or for health maintenance advice. Please see specific information pulled into the AVS if appropriate.       Electronically signed by Golden Bill MD,  11/30/2022, 10:45 EST.

## 2023-01-23 DIAGNOSIS — J44.9 ASTHMA-COPD OVERLAP SYNDROME: ICD-10-CM

## 2023-01-23 RX ORDER — BUDESONIDE 0.5 MG/2ML
0.5 INHALANT ORAL 2 TIMES DAILY
Qty: 90 ML | Refills: 3 | Status: SHIPPED | OUTPATIENT
Start: 2023-01-23

## 2023-01-23 RX ORDER — ALBUTEROL SULFATE 90 UG/1
2 AEROSOL, METERED RESPIRATORY (INHALATION) EVERY 4 HOURS PRN
Qty: 54 G | Refills: 3 | Status: SHIPPED | OUTPATIENT
Start: 2023-01-23

## 2023-01-23 RX ORDER — ARFORMOTEROL TARTRATE 15 UG/2ML
15 SOLUTION RESPIRATORY (INHALATION)
Qty: 90 ML | Refills: 3 | Status: SHIPPED | OUTPATIENT
Start: 2023-01-23

## 2023-05-14 ENCOUNTER — TELEPHONE (OUTPATIENT)
Dept: PULMONOLOGY | Facility: CLINIC | Age: 88
End: 2023-05-14
Payer: MEDICARE

## 2023-05-14 NOTE — PROGRESS NOTES
Primary Care Provider  Arias Beck MD     Referring Provider  No ref. provider found     Chief Complaint  Asthma, COPD, and Follow-up (6 month follow up. )    Subjective          History of Presenting Illness  Patient is a 87-year-old female, patient of Dr. Ferrer who presents for management of asthma/COPD overlap syndrome and obstructive sleep apnea who presents for a follow-up visit today.  Patient states that since last visit her breathing is doing well.  Patient states that she is taking Brovana, Pulmicort, and Spiriva every day as prescribed and uses albuterol inhaler as needed.  Patient states that she does have a CPAP machine, however it has been recalled and she has not used her CPAP in years and does not want to use a CPAP machine at this time.  Patient denies any morning headaches or excessive daytime sleepiness.  Patient states that she has not had take any antibiotics or steroids since last office visit and denies any hospitalizations since last office visit.  Patient states that she is under the care of Dr. Gutierrez, rheumatologist for rheumatoid arthritis and is on methotrexate and taking daily folic acid.  Patient is on Protonix for reflux.  Patient denies fever, chills, night sweats, swollen glands in the head and neck, unintentional weight loss, hemoptysis, purulent sputum production, dysphagia, chest pain, palpitations, chest tightness, abdominal pain, nausea, vomiting, and diarrhea.  Patient also denies any myalgias, changes in sense of taste and/or smell, sore throat, any other coronavirus or flu-like symptoms.  Patient denies any leg swelling, orthopnea, paroxysmal nocturnal dyspnea.  Patient is able to perform activities of daily living.        Review of Systems   Constitutional: Negative for activity change, appetite change, chills, diaphoresis, fatigue, fever, unexpected weight gain and unexpected weight loss.        Negative for Insomnia   HENT: Negative for congestion (Nasal), mouth  sores, nosebleeds, postnasal drip, sore throat, swollen glands and trouble swallowing.         Negative for Thrush  Negative for Hoarseness  Negative for Allergies/Hay Fever  Negative for Recent Head injury  Negative for Ear Fullness  Negative for Nasal or Sinus pain  Negative for Dry lips  Negative for Nasal discharge   Respiratory: Negative for apnea, cough, chest tightness, shortness of breath and wheezing.         Negative for Hemoptysis  Negative for Pleuritic pain   Cardiovascular: Negative for chest pain, palpitations and leg swelling.        Negative for Claudication  Negative for Cyanosis  Negative for Dyspnea on exertion   Gastrointestinal: Negative for abdominal pain, diarrhea, nausea, vomiting and GERD.   Musculoskeletal: Negative for joint swelling and myalgias.        Negative for Joint pain  Negative for Joint stiffness   Skin: Negative for color change, dry skin, pallor and rash.   Neurological: Negative for syncope, weakness and headache.   Hematological: Negative for adenopathy. Does not bruise/bleed easily.        Family History   Problem Relation Age of Onset   • Stroke Father    • Heart disease Father    • Diabetes Father         Social History     Socioeconomic History   • Marital status:    Tobacco Use   • Smoking status: Former     Packs/day: 0.25     Years: 15.00     Pack years: 3.75     Types: Cigarettes     Start date:      Quit date: 2017     Years since quittin.3   • Smokeless tobacco: Never   • Tobacco comments:     Started smoking as a teenager.   Vaping Use   • Vaping Use: Never used   Substance and Sexual Activity   • Alcohol use: Never   • Drug use: Never   • Sexual activity: Not Currently        Past Medical History:   Diagnosis Date   • Arthritis    • Bowel obstruction    • Diabetes mellitus    • Hypertension    • Tinnitus         Immunization History   Administered Date(s) Administered   • COVID-19 (PFIZER) BIVALENT 12+YRS 2022   • COVID-19 (PFIZER) Purple  Cap Monovalent 03/08/2021, 03/29/2021   • Flu Vaccine Intradermal Quad 18-64YR 10/05/2022   • Fluzone High Dose =>65 Years ([employer] ONLY) 09/17/2020   • Pneumococcal Polysaccharide (PPSV23) 10/20/2003   • Td 12/06/2001       Allergies   Allergen Reactions   • Sulfa Antibiotics Nausea And Vomiting          Current Outpatient Medications:   •  albuterol sulfate  (90 Base) MCG/ACT inhaler, Inhale 2 puffs Every 4 (Four) Hours As Needed for Wheezing., Disp: 54 g, Rfl: 3  •  amLODIPine (NORVASC) 5 MG tablet, , Disp: , Rfl:   •  arformoterol (BROVANA) 15 MCG/2ML nebulizer solution, Take 2 mL by nebulization 2 (Two) Times a Day., Disp: 90 mL, Rfl: 3  •  budesonide (PULMICORT) 0.5 MG/2ML nebulizer solution, Take 2 mL by nebulization 2 (Two) Times a Day., Disp: 90 mL, Rfl: 3  •  folic acid (FOLVITE) 1 MG tablet, Take 1 tablet by mouth Daily., Disp: , Rfl:   •  hydrALAZINE (APRESOLINE) 25 MG tablet, , Disp: , Rfl:   •  hydroxychloroquine (PLAQUENIL) 200 MG tablet, Take 1 tablet by mouth 2 (Two) Times a Day., Disp: , Rfl:   •  Levemir FlexTouch 100 UNIT/ML injection, Inject 6 Units under the skin into the appropriate area as directed Every Night., Disp: , Rfl:   •  losartan (COZAAR) 50 MG tablet, Take 1 tablet by mouth Daily. (Patient taking differently: Take 2 tablets by mouth Daily.), Disp: 30 tablet, Rfl: 0  •  metFORMIN (GLUCOPHAGE) 500 MG tablet, Take 1 tablet by mouth 2 (Two) Times a Day With Meals., Disp: , Rfl:   •  methotrexate 2.5 MG tablet, Take 10 tablets by mouth 1 (One) Time Per Week., Disp: , Rfl:   •  metoprolol tartrate (LOPRESSOR) 25 MG tablet, Take 0.5 tablets by mouth 2 (Two) Times a Day for 30 days., Disp: 30 tablet, Rfl: 0  •  Spiriva Respimat 2.5 MCG/ACT aerosol solution inhaler, Inhale 2 puffs Daily for 90 days., Disp: 3 each, Rfl: 3  •  Pramipexole Dihydrochloride (MIRAPEX PO), Take  by mouth 1 (One) Time Per Week. (Patient not taking: Reported on 5/23/2023), Disp: , Rfl:      Objective  "    Physical Exam  Vital Signs:   WDWN, Alert, NAD.    HEENT:  PERRL, EOMI.  OP, nares clear, no sinus tenderness  Neck:  Supple, no JVD, no thyromegaly.  Lymph: no axillary, cervical, supraclavicular lymphadenopathy noted bilaterally  Chest:  good aeration, clear to auscultation bilaterally, tympanic to percussion bilaterally, no work of breathing noted  CV: RRR, no MGR, pulses 2+, equal.  Abd:  Soft, NT, ND, + BS, no HSM  EXT:  no clubbing, no cyanosis, no edema, no joint tenderness  Neuro:  A&Ox3, CN grossly intact, no focal deficits.  Skin: No rashes or lesions noted.    /74 (BP Location: Left arm, Patient Position: Sitting, Cuff Size: Adult)   Pulse 81   Temp 98 °F (36.7 °C)   Resp 16   Ht 160 cm (62.99\")   Wt 56.9 kg (125 lb 6.4 oz)   SpO2 97% Comment: room air  BMI 22.22 kg/m²         Result Review :   I have reviewed Dr. Bill's last office visit note.    Procedures:           Assessment and Plan      Assessment:  1.  Asthma/COPD overlap syndrome.  Patient is on triple nebulizer/inhaler therapy.  2.  Obstructive sleep apnea. Patient has not used CPAP machine for several years and declines using CPAP machine.  3.  Rheumatoid arthritis.  Patient is on methotrexate managed by Dr. Gutierrez, rheumatologist.   4.  GERD.  Patient is on a PPI.  5.  Tobacco abuse of cigarettes in remission.  Not eligible for lung cancer screening.      Plan:  1.  Continue Brovana, Pulmicort, and Spiriva as prescribed and rinse mouth out after each use.  2.  Continue albuterol inhaler as needed.  3.  Patient continues to decline to use CPAP machine.  I discussed the complications of untreated sleep apnea including effects on hypertension, diabetes mellitus and nonrestorative sleep with hypersomnia which can increase risk for motor vehicle accidents.  Untreated sleep apnea is also a risk factor for development of pulmonary hypertension, atrial fibrillation, and stroke.  Patient verbalized understanding.    4.  Methotrexate " management per rheumatology.  Patient is advised to continue daily folic acid.  Patient to follow-up with rheumatologist as scheduled.  5.  For GERD,  patient to continue PPI.   Patient is also advised to sleep with the head of the bed elevated and do not eat 3-4 hours prior to bedtime.  6.  Vaccination status: patient reports they are up-to-date with flu, pneumonia, and Covid vaccines.  Patient is advised to continue to follow CDC recommendations such as social distancing wearing a mask and washing hands for at least 20 seconds.  7.  Smoking status: Patient is a former cigarette smoker.  Not eligible for lung cancer screening.  8.  Patient to call the office, 911, or go to the ER with new or worsening symptoms.  9.  Follow-up in 6 months, sooner if needed.              Follow Up   Return in about 6 months (around 11/23/2023).  Patient was given instructions and counseling regarding her condition or for health maintenance advice. Please see specific information pulled into the AVS if appropriate.

## 2023-05-23 ENCOUNTER — OFFICE VISIT (OUTPATIENT)
Dept: PULMONOLOGY | Facility: CLINIC | Age: 88
End: 2023-05-23
Payer: MEDICARE

## 2023-05-23 VITALS
WEIGHT: 125.4 LBS | OXYGEN SATURATION: 97 % | HEIGHT: 63 IN | DIASTOLIC BLOOD PRESSURE: 74 MMHG | RESPIRATION RATE: 16 BRPM | TEMPERATURE: 98 F | BODY MASS INDEX: 22.22 KG/M2 | HEART RATE: 81 BPM | SYSTOLIC BLOOD PRESSURE: 158 MMHG

## 2023-05-23 DIAGNOSIS — J44.9 ASTHMA-COPD OVERLAP SYNDROME: Primary | ICD-10-CM

## 2023-05-23 DIAGNOSIS — M06.9 RHEUMATOID ARTHRITIS, INVOLVING UNSPECIFIED SITE, UNSPECIFIED WHETHER RHEUMATOID FACTOR PRESENT: ICD-10-CM

## 2023-05-23 DIAGNOSIS — F17.201 TOBACCO ABUSE, IN REMISSION: ICD-10-CM

## 2023-05-23 DIAGNOSIS — G47.33 OSA (OBSTRUCTIVE SLEEP APNEA): ICD-10-CM

## 2023-05-23 DIAGNOSIS — K21.9 GASTROESOPHAGEAL REFLUX DISEASE, UNSPECIFIED WHETHER ESOPHAGITIS PRESENT: ICD-10-CM

## 2023-05-23 RX ORDER — TIOTROPIUM BROMIDE INHALATION SPRAY 3.12 UG/1
2 SPRAY, METERED RESPIRATORY (INHALATION)
Qty: 3 EACH | Refills: 3 | Status: SHIPPED | OUTPATIENT
Start: 2023-05-23 | End: 2023-08-21

## 2023-05-23 RX ORDER — TIOTROPIUM BROMIDE INHALATION SPRAY 3.12 UG/1
2 SPRAY, METERED RESPIRATORY (INHALATION)
Qty: 2 EACH | Refills: 0 | COMMUNITY
Start: 2023-05-23 | End: 2023-05-24

## 2023-09-18 ENCOUNTER — TELEPHONE (OUTPATIENT)
Dept: PULMONOLOGY | Facility: CLINIC | Age: 88
End: 2023-09-18
Payer: MEDICARE

## 2023-09-18 DIAGNOSIS — J44.9 ASTHMA-COPD OVERLAP SYNDROME: ICD-10-CM

## 2023-09-18 RX ORDER — ARFORMOTEROL TARTRATE 15 UG/2ML
15 SOLUTION RESPIRATORY (INHALATION)
Qty: 120 ML | Refills: 3 | Status: SHIPPED | OUTPATIENT
Start: 2023-09-18

## 2023-09-18 RX ORDER — TIOTROPIUM BROMIDE INHALATION SPRAY 3.12 UG/1
2 SPRAY, METERED RESPIRATORY (INHALATION)
Qty: 1 EACH | Refills: 3 | Status: SHIPPED | OUTPATIENT
Start: 2023-09-18

## 2023-09-18 RX ORDER — BUDESONIDE 0.5 MG/2ML
0.5 INHALANT ORAL 2 TIMES DAILY
Qty: 60 EACH | Refills: 3 | Status: SHIPPED | OUTPATIENT
Start: 2023-09-18

## 2023-09-18 NOTE — TELEPHONE ENCOUNTER
Ms. Rodriguez called and is requesting refills on Brovana 15 mcg/2ml, Pulmicort 0.5mg/2ml, Spiriva 2.5 mcg inhaler, Spiriva inhaler. She uses [facility] pharmacy.

## 2023-11-10 NOTE — PROGRESS NOTES
Primary Care Provider  Arias Beck MD     Referring Provider  No ref. provider found     Chief Complaint  Asthma-COPD overlap syndrome, Cough, Shortness of Breath, Wheezing, and Follow-up ( 6 MONTH FOLLOW UP)    Subjective          History of Presenting Illness  Patient is a 88-year-old female, patient of Dr. Ferrer who presents for management of asthma/COPD overlap syndrome and obstructive sleep apnea who presents for a follow-up visit today.  Patient states that since last visit her breathing is doing well.  Patient states at times she does get short of breath that is worse with heavy exertion, mild in severity, and improved with rest.  Patient states that she is taking Brovana, Pulmicort, and Spiriva every day as prescribed and uses albuterol inhaler as needed.  Patient states that she does have a CPAP machine, however it has been recalled and she has not used her CPAP in years and does not want to use a CPAP machine at this time.  Patient denies any morning headaches or excessive daytime sleepiness.  Patient states that she has not had take any antibiotics or steroids since last office visit and denies any hospitalizations since last office visit.  Patient states that she is under the care of Dr. Gutierrez, rheumatologist for rheumatoid arthritis and is on methotrexate and taking daily folic acid. Patient is on Protonix for reflux. Patient denies fever, chills, night sweats, swollen glands in the head and neck, unintentional weight loss, hemoptysis, purulent sputum production, dysphagia, chest pain, palpitations, chest tightness, abdominal pain, nausea, vomiting, and diarrhea.  Patient also denies any myalgias, changes in sense of taste and/or smell, sore throat, any other coronavirus or flu-like symptoms.  Patient denies any leg swelling, orthopnea, paroxysmal nocturnal dyspnea.  Patient is able to perform activities of daily living.        Review of Systems   Constitutional:  Negative for activity change,  appetite change, chills, diaphoresis, fatigue, fever, unexpected weight gain and unexpected weight loss.        Negative for Insomnia   HENT:  Negative for congestion (Nasal), mouth sores, nosebleeds, postnasal drip, sore throat, swollen glands and trouble swallowing.         Negative for Thrush  Negative for Hoarseness  Negative for Allergies/Hay Fever  Negative for Recent Head injury  Negative for Ear Fullness  Negative for Nasal or Sinus pain  Negative for Dry lips  Negative for Nasal discharge   Respiratory:  Positive for shortness of breath (with heavy exertion). Negative for apnea, cough, chest tightness and wheezing.         Negative for Hemoptysis  Negative for Pleuritic pain   Cardiovascular:  Negative for chest pain, palpitations and leg swelling.        Negative for Claudication  Negative for Cyanosis  Negative for Dyspnea on exertion   Gastrointestinal:  Negative for abdominal pain, diarrhea, nausea, vomiting and GERD.   Musculoskeletal:  Negative for joint swelling and myalgias.        Negative for Joint pain  Negative for Joint stiffness   Skin:  Negative for color change, dry skin, pallor and rash.   Neurological:  Negative for syncope, weakness and headache.   Hematological:  Negative for adenopathy. Does not bruise/bleed easily.        Family History   Problem Relation Age of Onset    Stroke Father     Heart disease Father     Diabetes Father         Social History     Socioeconomic History    Marital status:    Tobacco Use    Smoking status: Former     Packs/day: 0.25     Years: 15.00     Additional pack years: 0.00     Total pack years: 3.75     Types: Cigarettes     Start date:      Quit date: 2017     Years since quittin.9     Passive exposure: Past    Smokeless tobacco: Never    Tobacco comments:     Started smoking as a teenager.   Vaping Use    Vaping Use: Never used   Substance and Sexual Activity    Alcohol use: Never    Drug use: Never    Sexual activity: Not Currently         Past Medical History:   Diagnosis Date    Arthritis     Bowel obstruction     Diabetes mellitus     Hypertension     Tinnitus         Immunization History   Administered Date(s) Administered    COVID-19 (PFIZER) BIVALENT 12+YRS 11/22/2022    COVID-19 (PFIZER) Purple Cap Monovalent 03/08/2021, 03/29/2021    Flu Vaccine Intradermal Quad 18-64YR 10/05/2022    Fluzone High Dose =>65 Years ([employer] ONLY) 09/17/2020    Fluzone High-Dose 65+yrs 11/27/2023    Pneumococcal Polysaccharide (PPSV23) 10/20/2003    Td (TDVAX) 12/06/2001       Allergies   Allergen Reactions    Sulfa Antibiotics Nausea And Vomiting          Current Outpatient Medications:     albuterol sulfate  (90 Base) MCG/ACT inhaler, Inhale 2 puffs Every 4 (Four) Hours As Needed for Wheezing., Disp: 54 g, Rfl: 3    amLODIPine (NORVASC) 5 MG tablet, , Disp: , Rfl:     arformoterol (BROVANA) 15 MCG/2ML nebulizer solution, Take 2 mL by nebulization 2 (Two) Times a Day for 90 days., Disp: 360 mL, Rfl: 3    budesonide (PULMICORT) 0.5 MG/2ML nebulizer solution, Take 2 mL by nebulization 2 (Two) Times a Day for 90 days. Rinse mouth out after each use, Disp: 360 mL, Rfl: 2    folic acid (FOLVITE) 1 MG tablet, Take 1 tablet by mouth Daily., Disp: , Rfl:     hydrALAZINE (APRESOLINE) 25 MG tablet, , Disp: , Rfl:     hydroxychloroquine (PLAQUENIL) 200 MG tablet, Take 1 tablet by mouth 2 (Two) Times a Day., Disp: , Rfl:     Levemir FlexTouch 100 UNIT/ML injection, Inject 6 Units under the skin into the appropriate area as directed Every Night., Disp: , Rfl:     losartan (COZAAR) 50 MG tablet, Take 1 tablet by mouth Daily. (Patient taking differently: Take 2 tablets by mouth Daily.), Disp: 30 tablet, Rfl: 0    metFORMIN (GLUCOPHAGE) 500 MG tablet, Take 1 tablet by mouth 2 (Two) Times a Day With Meals., Disp: , Rfl:     methotrexate 2.5 MG tablet, Take 10 tablets by mouth 1 (One) Time Per Week., Disp: , Rfl:     metoprolol tartrate (LOPRESSOR) 25 MG tablet, Take  "0.5 tablets by mouth 2 (Two) Times a Day for 30 days., Disp: 30 tablet, Rfl: 0    Spiriva Respimat 2.5 MCG/ACT aerosol solution inhaler, Inhale 2 puffs Daily for 90 days., Disp: 3 each, Rfl: 3    Diclofenac Sodium (Voltaren) 1 % gel gel, Apply  topically to the appropriate area as directed Every 12 (Twelve) Hours., Disp: , Rfl:     Pramipexole Dihydrochloride (MIRAPEX PO), Take  by mouth 1 (One) Time Per Week. (Patient not taking: Reported on 5/23/2023), Disp: , Rfl:      Objective     Physical Exam  Vital Signs:   WDWN, Alert, NAD.    HEENT:  PERRL, EOMI.  OP, nares clear, no sinus tenderness  Neck:  Supple, no JVD, no thyromegaly.  Lymph: no axillary, cervical, supraclavicular lymphadenopathy noted bilaterally  Chest:  good aeration, clear to auscultation bilaterally, tympanic to percussion bilaterally, no work of breathing noted  CV: RRR, no MGR, pulses 2+, equal.  Abd:  Soft, NT, ND, + BS, no HSM  EXT:  no clubbing, no cyanosis, no edema, no joint tenderness  Neuro:  A&Ox3, CN grossly intact, no focal deficits.  Skin: No rashes or lesions noted.    /65 (BP Location: Right arm, Patient Position: Sitting, Cuff Size: Small Adult)   Pulse 65   Resp 16   Ht 160 cm (62.99\")   Wt 53.9 kg (118 lb 12.8 oz)   SpO2 100% Comment: ROOM AIR  BMI 21.05 kg/m²         Result Review :   I have reviewed my last office visit note.    Procedures:           Assessment and Plan      Assessment:  1.  Asthma/COPD overlap syndrome.  Patient is on triple nebulizer/inhaler therapy.  2.  Obstructive sleep apnea. Patient has not used CPAP machine for several years and declines using CPAP machine.  3.  Rheumatoid arthritis.  Patient is on methotrexate managed by Dr. Gutierrez, rheumatologist.   4.  GERD.  Patient is on a PPI.  5.  Tobacco abuse of cigarettes in remission.  Not eligible for lung cancer screening.  6.  Encounter immunization: Flu vaccine given to the patient in the office today.      Plan:  1.  Continue Yojana, " Pulmicort, and Spiriva as prescribed and rinse mouth out after each use.  2.  Continue albuterol inhaler as needed.  3.  Patient continues to decline a CPAP machine.  I discussed the complications of untreated sleep apnea including effects on hypertension, diabetes mellitus and nonrestorative sleep with hypersomnia which can increase risk for motor vehicle accidents.  Untreated sleep apnea is also a risk factor for development of pulmonary hypertension, atrial fibrillation, and stroke.  Patient verbalized understanding.    4.  Methotrexate management per rheumatology.  Patient is advised to continue daily folic acid.  Patient to follow-up with rheumatologist as scheduled.  5.  For GERD,  patient to continue PPI.   Patient is also advised to sleep with the head of the bed elevated and do not eat 3-4 hours prior to bedtime.  6.  Vaccination status: Flu vaccine given to the patient in the office today. Patient reports they are up-to-date with pneumonia and Covid vaccines.  Patient is advised to continue to follow CDC recommendations such as social distancing wearing a mask and washing hands for at least 20 seconds.  7.  Smoking status: Patient is a former cigarette smoker.  Not eligible for lung cancer screening.  8.  Patient to call the office, 911, or go to the ER with new or worsening symptoms.  9.  Follow-up in 6 months, sooner if needed.             Follow Up   Return in about 6 months (around 5/27/2024).  Patient was given instructions and counseling regarding her condition or for health maintenance advice. Please see specific information pulled into the AVS if appropriate.

## 2023-11-27 ENCOUNTER — OFFICE VISIT (OUTPATIENT)
Dept: PULMONOLOGY | Facility: CLINIC | Age: 88
End: 2023-11-27
Payer: MEDICARE

## 2023-11-27 VITALS
HEART RATE: 65 BPM | WEIGHT: 118.8 LBS | DIASTOLIC BLOOD PRESSURE: 65 MMHG | RESPIRATION RATE: 16 BRPM | OXYGEN SATURATION: 100 % | BODY MASS INDEX: 21.05 KG/M2 | HEIGHT: 63 IN | SYSTOLIC BLOOD PRESSURE: 146 MMHG

## 2023-11-27 DIAGNOSIS — J44.89 ASTHMA-COPD OVERLAP SYNDROME: Primary | ICD-10-CM

## 2023-11-27 DIAGNOSIS — Z23 ENCOUNTER FOR IMMUNIZATION: ICD-10-CM

## 2023-11-27 DIAGNOSIS — K21.9 GASTROESOPHAGEAL REFLUX DISEASE, UNSPECIFIED WHETHER ESOPHAGITIS PRESENT: ICD-10-CM

## 2023-11-27 DIAGNOSIS — F17.201 TOBACCO ABUSE, IN REMISSION: ICD-10-CM

## 2023-11-27 DIAGNOSIS — G47.33 OSA (OBSTRUCTIVE SLEEP APNEA): ICD-10-CM

## 2023-11-27 DIAGNOSIS — M06.9 RHEUMATOID ARTHRITIS, INVOLVING UNSPECIFIED SITE, UNSPECIFIED WHETHER RHEUMATOID FACTOR PRESENT: ICD-10-CM

## 2023-11-27 DIAGNOSIS — Z23 FLU VACCINE NEED: ICD-10-CM

## 2023-11-27 PROCEDURE — 1159F MED LIST DOCD IN RCRD: CPT | Performed by: NURSE PRACTITIONER

## 2023-11-27 PROCEDURE — 99214 OFFICE O/P EST MOD 30 MIN: CPT | Performed by: NURSE PRACTITIONER

## 2023-11-27 PROCEDURE — G0008 ADMIN INFLUENZA VIRUS VAC: HCPCS | Performed by: NURSE PRACTITIONER

## 2023-11-27 PROCEDURE — 90662 IIV NO PRSV INCREASED AG IM: CPT | Performed by: NURSE PRACTITIONER

## 2023-11-27 PROCEDURE — 1160F RVW MEDS BY RX/DR IN RCRD: CPT | Performed by: NURSE PRACTITIONER

## 2023-11-27 RX ORDER — BUDESONIDE 0.5 MG/2ML
0.5 INHALANT ORAL 2 TIMES DAILY
Qty: 360 ML | Refills: 2 | Status: SHIPPED | OUTPATIENT
Start: 2023-11-27 | End: 2024-02-25

## 2023-11-27 RX ORDER — TIOTROPIUM BROMIDE INHALATION SPRAY 3.12 UG/1
2 SPRAY, METERED RESPIRATORY (INHALATION)
Qty: 3 EACH | Refills: 3 | Status: SHIPPED | OUTPATIENT
Start: 2023-11-27 | End: 2024-02-25

## 2023-11-27 RX ORDER — ARFORMOTEROL TARTRATE 15 UG/2ML
15 SOLUTION RESPIRATORY (INHALATION)
Qty: 360 ML | Refills: 3 | Status: SHIPPED | OUTPATIENT
Start: 2023-11-27 | End: 2024-02-25

## 2023-11-27 RX ORDER — ALBUTEROL SULFATE 90 UG/1
2 AEROSOL, METERED RESPIRATORY (INHALATION) EVERY 4 HOURS PRN
Qty: 54 G | Refills: 3 | Status: SHIPPED | OUTPATIENT
Start: 2023-11-27

## 2023-12-19 ENCOUNTER — TRANSCRIBE ORDERS (OUTPATIENT)
Dept: LAB | Facility: HOSPITAL | Age: 88
End: 2023-12-19
Payer: MEDICARE

## 2023-12-19 ENCOUNTER — LAB (OUTPATIENT)
Dept: LAB | Facility: HOSPITAL | Age: 88
End: 2023-12-19
Payer: MEDICARE

## 2023-12-19 DIAGNOSIS — I10 HYPERTENSION, UNSPECIFIED TYPE: ICD-10-CM

## 2023-12-19 DIAGNOSIS — E78.2 MIXED HYPERLIPIDEMIA: ICD-10-CM

## 2023-12-19 DIAGNOSIS — E11.65 TYPE II DIABETES MELLITUS WITH HYPEROSMOLARITY, UNCONTROLLED: Primary | ICD-10-CM

## 2023-12-19 DIAGNOSIS — E11.00 TYPE II DIABETES MELLITUS WITH HYPEROSMOLARITY, UNCONTROLLED: ICD-10-CM

## 2023-12-19 DIAGNOSIS — E11.65 TYPE II DIABETES MELLITUS WITH HYPEROSMOLARITY, UNCONTROLLED: ICD-10-CM

## 2023-12-19 DIAGNOSIS — E11.00 TYPE II DIABETES MELLITUS WITH HYPEROSMOLARITY, UNCONTROLLED: Primary | ICD-10-CM

## 2023-12-19 LAB
ALBUMIN SERPL-MCNC: 4.6 G/DL (ref 3.5–5.2)
ALBUMIN UR-MCNC: 3 MG/DL
ALBUMIN/GLOB SERPL: 1.7 G/DL
ALP SERPL-CCNC: 73 U/L (ref 39–117)
ALT SERPL W P-5'-P-CCNC: 14 U/L (ref 1–33)
ANION GAP SERPL CALCULATED.3IONS-SCNC: 11 MMOL/L (ref 5–15)
AST SERPL-CCNC: 15 U/L (ref 1–32)
BASOPHILS # BLD AUTO: 0.04 10*3/MM3 (ref 0–0.2)
BASOPHILS NFR BLD AUTO: 0.9 % (ref 0–1.5)
BILIRUB SERPL-MCNC: 0.3 MG/DL (ref 0–1.2)
BUN SERPL-MCNC: 18 MG/DL (ref 8–23)
BUN/CREAT SERPL: 26.5 (ref 7–25)
CALCIUM SPEC-SCNC: 9.8 MG/DL (ref 8.6–10.5)
CHLORIDE SERPL-SCNC: 104 MMOL/L (ref 98–107)
CHOLEST SERPL-MCNC: 157 MG/DL (ref 0–200)
CO2 SERPL-SCNC: 25 MMOL/L (ref 22–29)
CREAT SERPL-MCNC: 0.68 MG/DL (ref 0.57–1)
CREAT UR-MCNC: 123.4 MG/DL
DEPRECATED RDW RBC AUTO: 45.6 FL (ref 37–54)
EGFRCR SERPLBLD CKD-EPI 2021: 84.9 ML/MIN/1.73
EOSINOPHIL # BLD AUTO: 0.26 10*3/MM3 (ref 0–0.4)
EOSINOPHIL NFR BLD AUTO: 6.1 % (ref 0.3–6.2)
ERYTHROCYTE [DISTWIDTH] IN BLOOD BY AUTOMATED COUNT: 13.4 % (ref 12.3–15.4)
GLOBULIN UR ELPH-MCNC: 2.7 GM/DL
GLUCOSE SERPL-MCNC: 95 MG/DL (ref 65–99)
HBA1C MFR BLD: 6.3 % (ref 4.8–5.6)
HCT VFR BLD AUTO: 36.1 % (ref 34–46.6)
HDLC SERPL-MCNC: 49 MG/DL (ref 40–60)
HGB BLD-MCNC: 11.9 G/DL (ref 12–15.9)
IMM GRANULOCYTES # BLD AUTO: 0.01 10*3/MM3 (ref 0–0.05)
IMM GRANULOCYTES NFR BLD AUTO: 0.2 % (ref 0–0.5)
LDLC SERPL CALC-MCNC: 97 MG/DL (ref 0–100)
LDLC/HDLC SERPL: 1.99 {RATIO}
LYMPHOCYTES # BLD AUTO: 1.36 10*3/MM3 (ref 0.7–3.1)
LYMPHOCYTES NFR BLD AUTO: 32.2 % (ref 19.6–45.3)
MCH RBC QN AUTO: 30.3 PG (ref 26.6–33)
MCHC RBC AUTO-ENTMCNC: 33 G/DL (ref 31.5–35.7)
MCV RBC AUTO: 91.9 FL (ref 79–97)
MONOCYTES # BLD AUTO: 0.44 10*3/MM3 (ref 0.1–0.9)
MONOCYTES NFR BLD AUTO: 10.4 % (ref 5–12)
NEUTROPHILS NFR BLD AUTO: 2.12 10*3/MM3 (ref 1.7–7)
NEUTROPHILS NFR BLD AUTO: 50.2 % (ref 42.7–76)
NRBC BLD AUTO-RTO: 0 /100 WBC (ref 0–0.2)
PLATELET # BLD AUTO: 243 10*3/MM3 (ref 140–450)
PMV BLD AUTO: 10.5 FL (ref 6–12)
POTASSIUM SERPL-SCNC: 4 MMOL/L (ref 3.5–5.2)
PROT SERPL-MCNC: 7.3 G/DL (ref 6–8.5)
RBC # BLD AUTO: 3.93 10*6/MM3 (ref 3.77–5.28)
SODIUM SERPL-SCNC: 140 MMOL/L (ref 136–145)
TRIGL SERPL-MCNC: 53 MG/DL (ref 0–150)
TSH SERPL DL<=0.05 MIU/L-ACNC: 0.93 UIU/ML (ref 0.27–4.2)
VLDLC SERPL-MCNC: 11 MG/DL (ref 5–40)
WBC NRBC COR # BLD AUTO: 4.23 10*3/MM3 (ref 3.4–10.8)

## 2023-12-19 PROCEDURE — 80053 COMPREHEN METABOLIC PANEL: CPT

## 2023-12-19 PROCEDURE — 36415 COLL VENOUS BLD VENIPUNCTURE: CPT

## 2023-12-19 PROCEDURE — 82570 ASSAY OF URINE CREATININE: CPT

## 2023-12-19 PROCEDURE — 84443 ASSAY THYROID STIM HORMONE: CPT

## 2023-12-19 PROCEDURE — 85025 COMPLETE CBC W/AUTO DIFF WBC: CPT

## 2023-12-19 PROCEDURE — 82043 UR ALBUMIN QUANTITATIVE: CPT

## 2023-12-19 PROCEDURE — 80061 LIPID PANEL: CPT

## 2023-12-19 PROCEDURE — 83036 HEMOGLOBIN GLYCOSYLATED A1C: CPT

## 2024-03-11 ENCOUNTER — TELEPHONE (OUTPATIENT)
Dept: PULMONOLOGY | Facility: CLINIC | Age: 89
End: 2024-03-11
Payer: MEDICARE

## 2024-03-11 DIAGNOSIS — J44.89 ASTHMA-COPD OVERLAP SYNDROME: ICD-10-CM

## 2024-03-11 RX ORDER — TIOTROPIUM BROMIDE INHALATION SPRAY 3.12 UG/1
2 SPRAY, METERED RESPIRATORY (INHALATION)
Qty: 3 EACH | Refills: 3 | Status: SHIPPED | OUTPATIENT
Start: 2024-03-11 | End: 2024-06-09

## 2024-03-11 NOTE — TELEPHONE ENCOUNTER
Caller: Annita Rodriguez    Relationship: Self    Best call back number: [phone]     Requested Prescriptions: Spiriva Respimat 2.5 MCG/ACT aerosol solution inhaler [[id number]] (Order [id number])  Order        Requested Prescriptions      No prescriptions requested or ordered in this encounter        Pharmacy where request should be sent:  [facility] - [place], [place] - [address] [place] [id number]  - [phone] [address] [address]  Phone: [phone]  Fax: [phone]     Last office visit with prescribing clinician: 2023   Last telemedicine visit with prescribing clinician: Visit date not found   Next office visit with prescribing clinician: 2024     Additional details provided by patient:     Does the patient have less than a 3 day supply:  [] Yes  [] No    Would you like a call back once the refill request has been completed: [] Yes [] No    If the office needs to give you a call back, can they leave a voicemail: [] Yes [] No    Darvin Hooper Rep   24 11:08 EDT

## 2024-05-15 NOTE — PATIENT INSTRUCTIONS
Sleep Apnea  Sleep apnea affects breathing during sleep. It causes breathing to stop for 10 seconds or more, or to become shallow. People with sleep apnea usually snore loudly.  It can also increase the risk of:  Heart attack.  Stroke.  Being very overweight (obese).  Diabetes.  Heart failure.  Irregular heartbeat.  High blood pressure.  The goal of treatment is to help you breathe normally again.  What are the causes?    The most common cause of this condition is a collapsed or blocked airway.  There are three kinds of sleep apnea:  Obstructive sleep apnea. This is caused by a blocked or collapsed airway.  Central sleep apnea. This happens when the brain does not send the right signals to the muscles that control breathing.  Mixed sleep apnea. This is a combination of obstructive and central sleep apnea.  What increases the risk?  Being overweight.  Smoking.  Having a small airway.  Being older.  Being male.  Drinking alcohol.  Taking medicines to calm yourself (sedatives or tranquilizers).  Having family members with the condition.  Having a tongue or tonsils that are larger than normal.  What are the signs or symptoms?  Trouble staying asleep.  Loud snoring.  Headaches in the morning.  Waking up gasping.  Dry mouth or sore throat in the morning.  Being sleepy or tired during the day.  If you are sleepy or tired during the day, you may also:  Not be able to focus your mind (concentrate).  Forget things.  Get angry a lot and have mood swings.  Feel sad (depressed).  Have changes in your personality.  Have less interest in sex, if you are female.  Be unable to have an erection, if you are male.  How is this treated?    Sleeping on your side.  Using a medicine to get rid of mucus in your nose (decongestant).  Avoiding the use of alcohol, medicines to help you relax, or certain pain medicines (narcotics).  Losing weight, if needed.  Changing your diet.  Quitting smoking.  Using a machine to open your airway while you  sleep, such as:  An oral appliance. This is a mouthpiece that shifts your lower jaw forward.  A CPAP device. This device blows air through a mask when you breathe out (exhale).  An EPAP device. This has valves that you put in each nostril.  A BIPAP device. This device blows air through a mask when you breathe in (inhale) and breathe out.  Having surgery if other treatments do not work.  Follow these instructions at home:  Lifestyle  Make changes that your doctor recommends.  Eat a healthy diet.  Lose weight if needed.  Avoid alcohol, medicines to help you relax, and some pain medicines.  Do not smoke or use any products that contain nicotine or tobacco. If you need help quitting, ask your doctor.  General instructions  Take over-the-counter and prescription medicines only as told by your doctor.  If you were given a machine to use while you sleep, use it only as told by your doctor.  If you are having surgery, make sure to tell your doctor you have sleep apnea. You may need to bring your device with you.  Keep all follow-up visits.  Contact a doctor if:  The machine that you were given to use during sleep bothers you or does not seem to be working.  You do not get better.  You get worse.  Get help right away if:  Your chest hurts.  You have trouble breathing in enough air.  You have an uncomfortable feeling in your back, arms, or stomach.  You have trouble talking.  One side of your body feels weak.  A part of your face is hanging down.  These symptoms may be an emergency. Get help right away. Call your local emergency services (911 in the [place]  Do not wait to see if the symptoms will go away.  Do not drive yourself to the hospital.  Summary  This condition affects breathing during sleep.  The most common cause is a collapsed or blocked airway.  The goal of treatment is to help you breathe normally while you sleep.  This information is not intended to replace advice given to you by your health care provider. Make  sure you discuss any questions you have with your health care provider.  Document Revised: 07/27/2022 Document Reviewed: 11/26/2021  Elsevier Patient Education © 2024 [employer]

## 2024-06-04 ENCOUNTER — OFFICE VISIT (OUTPATIENT)
Dept: PULMONOLOGY | Facility: CLINIC | Age: 89
End: 2024-06-04
Payer: MEDICARE

## 2024-06-04 VITALS
BODY MASS INDEX: 20.45 KG/M2 | DIASTOLIC BLOOD PRESSURE: 73 MMHG | SYSTOLIC BLOOD PRESSURE: 148 MMHG | RESPIRATION RATE: 16 BRPM | HEART RATE: 101 BPM | HEIGHT: 63 IN | WEIGHT: 115.4 LBS | OXYGEN SATURATION: 95 % | TEMPERATURE: 97.7 F

## 2024-06-04 DIAGNOSIS — J44.89 ASTHMA-COPD OVERLAP SYNDROME: Primary | ICD-10-CM

## 2024-06-04 DIAGNOSIS — K21.9 GASTROESOPHAGEAL REFLUX DISEASE, UNSPECIFIED WHETHER ESOPHAGITIS PRESENT: ICD-10-CM

## 2024-06-04 DIAGNOSIS — M06.9 RHEUMATOID ARTHRITIS, INVOLVING UNSPECIFIED SITE, UNSPECIFIED WHETHER RHEUMATOID FACTOR PRESENT: ICD-10-CM

## 2024-06-04 DIAGNOSIS — G47.33 OSA (OBSTRUCTIVE SLEEP APNEA): ICD-10-CM

## 2024-06-04 DIAGNOSIS — F17.201 TOBACCO ABUSE, IN REMISSION: ICD-10-CM

## 2024-06-04 PROCEDURE — 1160F RVW MEDS BY RX/DR IN RCRD: CPT | Performed by: NURSE PRACTITIONER

## 2024-06-04 PROCEDURE — 99214 OFFICE O/P EST MOD 30 MIN: CPT | Performed by: NURSE PRACTITIONER

## 2024-06-04 PROCEDURE — 1159F MED LIST DOCD IN RCRD: CPT | Performed by: NURSE PRACTITIONER

## 2024-06-04 RX ORDER — BUDESONIDE 0.5 MG/2ML
0.5 INHALANT ORAL 2 TIMES DAILY
Qty: 360 ML | Refills: 3 | Status: SHIPPED | OUTPATIENT
Start: 2024-06-04 | End: 2024-09-02

## 2024-06-04 RX ORDER — ARFORMOTEROL TARTRATE 15 UG/2ML
15 SOLUTION RESPIRATORY (INHALATION)
Qty: 360 ML | Refills: 3 | Status: SHIPPED | OUTPATIENT
Start: 2024-06-04 | End: 2024-09-02

## 2024-06-04 RX ORDER — ALBUTEROL SULFATE 90 UG/1
2 AEROSOL, METERED RESPIRATORY (INHALATION) EVERY 4 HOURS PRN
Qty: 54 G | Refills: 3 | Status: SHIPPED | OUTPATIENT
Start: 2024-06-04

## 2024-06-04 RX ORDER — ARFORMOTEROL TARTRATE 15 UG/2ML
15 SOLUTION RESPIRATORY (INHALATION)
COMMUNITY
End: 2024-06-04 | Stop reason: SDUPTHER

## 2024-06-04 RX ORDER — TIOTROPIUM BROMIDE INHALATION SPRAY 3.12 UG/1
2 SPRAY, METERED RESPIRATORY (INHALATION)
Qty: 3 EACH | Refills: 3 | Status: SHIPPED | OUTPATIENT
Start: 2024-06-04 | End: 2024-09-02

## 2024-12-05 ENCOUNTER — TRANSCRIBE ORDERS (OUTPATIENT)
Dept: LAB | Facility: HOSPITAL | Age: 89
End: 2024-12-05
Payer: MEDICARE

## 2024-12-05 ENCOUNTER — LAB (OUTPATIENT)
Dept: LAB | Facility: HOSPITAL | Age: 89
End: 2024-12-05
Payer: MEDICARE

## 2024-12-05 DIAGNOSIS — E11.65 INADEQUATELY CONTROLLED DIABETES MELLITUS: Primary | ICD-10-CM

## 2024-12-05 DIAGNOSIS — E78.2 HYPERLIPIDEMIA, MIXED: ICD-10-CM

## 2024-12-05 DIAGNOSIS — N18.31 CHRONIC KIDNEY DISEASE (CKD) STAGE G3A/A1, MODERATELY DECREASED GLOMERULAR FILTRATION RATE (GFR) BETWEEN 45-59 ML/MIN/1.73 SQUARE METER AND ALBUMINURIA CREATININE RATIO LESS THAN 30 MG/G (CMS/H*: ICD-10-CM

## 2024-12-05 DIAGNOSIS — E11.65 INADEQUATELY CONTROLLED DIABETES MELLITUS: ICD-10-CM

## 2024-12-05 DIAGNOSIS — M06.09 RHEUMATOID ARTHRITIS OF MULTIPLE SITES WITHOUT RHEUMATOID FACTOR: ICD-10-CM

## 2024-12-05 DIAGNOSIS — M06.09 RHEUMATOID ARTHRITIS OF MULTIPLE SITES WITHOUT RHEUMATOID FACTOR: Primary | ICD-10-CM

## 2024-12-05 LAB
ALBUMIN SERPL-MCNC: 4.1 G/DL (ref 3.5–5.2)
ALBUMIN UR-MCNC: 5.4 MG/DL
ALBUMIN/GLOB SERPL: 1.3 G/DL
ALP SERPL-CCNC: 77 U/L (ref 39–117)
ALT SERPL W P-5'-P-CCNC: 17 U/L (ref 1–33)
ANION GAP SERPL CALCULATED.3IONS-SCNC: 11.2 MMOL/L (ref 5–15)
AST SERPL-CCNC: 20 U/L (ref 1–32)
BASOPHILS # BLD AUTO: 0.04 10*3/MM3 (ref 0–0.2)
BASOPHILS NFR BLD AUTO: 0.8 % (ref 0–1.5)
BILIRUB SERPL-MCNC: 0.4 MG/DL (ref 0–1.2)
BUN SERPL-MCNC: 19 MG/DL (ref 8–23)
BUN/CREAT SERPL: 24.4 (ref 7–25)
CALCIUM SPEC-SCNC: 9.4 MG/DL (ref 8.6–10.5)
CHLORIDE SERPL-SCNC: 104 MMOL/L (ref 98–107)
CHOLEST SERPL-MCNC: 159 MG/DL (ref 0–200)
CO2 SERPL-SCNC: 23.8 MMOL/L (ref 22–29)
CREAT SERPL-MCNC: 0.78 MG/DL (ref 0.57–1)
CREAT UR-MCNC: 237.2 MG/DL
CRP SERPL-MCNC: <0.3 MG/DL (ref 0–0.5)
DEPRECATED RDW RBC AUTO: 45.1 FL (ref 37–54)
EGFRCR SERPLBLD CKD-EPI 2021: 73.6 ML/MIN/1.73
EOSINOPHIL # BLD AUTO: 0.63 10*3/MM3 (ref 0–0.4)
EOSINOPHIL NFR BLD AUTO: 12 % (ref 0.3–6.2)
ERYTHROCYTE [DISTWIDTH] IN BLOOD BY AUTOMATED COUNT: 13.6 % (ref 12.3–15.4)
ERYTHROCYTE [SEDIMENTATION RATE] IN BLOOD: 22 MM/HR (ref 0–30)
GLOBULIN UR ELPH-MCNC: 3.2 GM/DL
GLUCOSE SERPL-MCNC: 102 MG/DL (ref 65–99)
HBA1C MFR BLD: 6.4 % (ref 4.8–5.6)
HCT VFR BLD AUTO: 36.9 % (ref 34–46.6)
HDLC SERPL-MCNC: 55 MG/DL (ref 40–60)
HGB BLD-MCNC: 12.2 G/DL (ref 12–15.9)
IMM GRANULOCYTES # BLD AUTO: 0.02 10*3/MM3 (ref 0–0.05)
IMM GRANULOCYTES NFR BLD AUTO: 0.4 % (ref 0–0.5)
LDLC SERPL CALC-MCNC: 88 MG/DL (ref 0–100)
LDLC/HDLC SERPL: 1.58 {RATIO}
LYMPHOCYTES # BLD AUTO: 1.51 10*3/MM3 (ref 0.7–3.1)
LYMPHOCYTES NFR BLD AUTO: 28.7 % (ref 19.6–45.3)
MCH RBC QN AUTO: 30.1 PG (ref 26.6–33)
MCHC RBC AUTO-ENTMCNC: 33.1 G/DL (ref 31.5–35.7)
MCV RBC AUTO: 91.1 FL (ref 79–97)
MICROALBUMIN/CREAT UR: 22.8 MG/G (ref 0–29)
MONOCYTES # BLD AUTO: 0.4 10*3/MM3 (ref 0.1–0.9)
MONOCYTES NFR BLD AUTO: 7.6 % (ref 5–12)
NEUTROPHILS NFR BLD AUTO: 2.67 10*3/MM3 (ref 1.7–7)
NEUTROPHILS NFR BLD AUTO: 50.5 % (ref 42.7–76)
NRBC BLD AUTO-RTO: 0 /100 WBC (ref 0–0.2)
PLATELET # BLD AUTO: 313 10*3/MM3 (ref 140–450)
PMV BLD AUTO: 10.1 FL (ref 6–12)
POTASSIUM SERPL-SCNC: 3.7 MMOL/L (ref 3.5–5.2)
PROT SERPL-MCNC: 7.3 G/DL (ref 6–8.5)
RBC # BLD AUTO: 4.05 10*6/MM3 (ref 3.77–5.28)
SODIUM SERPL-SCNC: 139 MMOL/L (ref 136–145)
TRIGL SERPL-MCNC: 86 MG/DL (ref 0–150)
TSH SERPL DL<=0.05 MIU/L-ACNC: 0.96 UIU/ML (ref 0.27–4.2)
VLDLC SERPL-MCNC: 16 MG/DL (ref 5–40)
WBC NRBC COR # BLD AUTO: 5.27 10*3/MM3 (ref 3.4–10.8)

## 2024-12-05 PROCEDURE — 83036 HEMOGLOBIN GLYCOSYLATED A1C: CPT

## 2024-12-05 PROCEDURE — 82570 ASSAY OF URINE CREATININE: CPT

## 2024-12-05 PROCEDURE — 80061 LIPID PANEL: CPT

## 2024-12-05 PROCEDURE — 85025 COMPLETE CBC W/AUTO DIFF WBC: CPT

## 2024-12-05 PROCEDURE — 82043 UR ALBUMIN QUANTITATIVE: CPT

## 2024-12-05 PROCEDURE — 84443 ASSAY THYROID STIM HORMONE: CPT

## 2024-12-05 PROCEDURE — 80053 COMPREHEN METABOLIC PANEL: CPT

## 2024-12-05 PROCEDURE — 86140 C-REACTIVE PROTEIN: CPT

## 2024-12-05 PROCEDURE — 36415 COLL VENOUS BLD VENIPUNCTURE: CPT

## 2024-12-05 PROCEDURE — 85652 RBC SED RATE AUTOMATED: CPT

## 2024-12-10 ENCOUNTER — HOSPITAL ENCOUNTER (EMERGENCY)
Facility: HOSPITAL | Age: 89
Discharge: HOME | End: 2024-12-10
Attending: EMERGENCY MEDICINE | Admitting: EMERGENCY MEDICINE
Payer: MEDICARE

## 2024-12-10 ENCOUNTER — APPOINTMENT (OUTPATIENT)
Dept: CT IMAGING | Facility: HOSPITAL | Age: 89
End: 2024-12-10
Payer: MEDICARE

## 2024-12-10 VITALS
RESPIRATION RATE: 20 BRPM | TEMPERATURE: 98.9 F | HEIGHT: 63 IN | DIASTOLIC BLOOD PRESSURE: 82 MMHG | SYSTOLIC BLOOD PRESSURE: 164 MMHG | BODY MASS INDEX: 21.17 KG/M2 | OXYGEN SATURATION: 97 % | HEART RATE: 102 BPM | WEIGHT: 119.49 LBS

## 2024-12-10 DIAGNOSIS — N39.0 URINARY TRACT INFECTION WITHOUT HEMATURIA, SITE UNSPECIFIED: ICD-10-CM

## 2024-12-10 DIAGNOSIS — K56.609 SMALL BOWEL OBSTRUCTION: Primary | ICD-10-CM

## 2024-12-10 LAB
ALBUMIN SERPL-MCNC: 4.4 G/DL (ref 3.5–5.2)
ALBUMIN/GLOB SERPL: 1.6 G/DL
ALP SERPL-CCNC: 78 U/L (ref 39–117)
ALT SERPL W P-5'-P-CCNC: 11 U/L (ref 1–33)
ANION GAP SERPL CALCULATED.3IONS-SCNC: 12.4 MMOL/L (ref 5–15)
AST SERPL-CCNC: 15 U/L (ref 1–32)
BACTERIA UR QL AUTO: ABNORMAL /HPF
BASOPHILS # BLD AUTO: 0.03 10*3/MM3 (ref 0–0.2)
BASOPHILS NFR BLD AUTO: 0.4 % (ref 0–1.5)
BILIRUB SERPL-MCNC: 0.4 MG/DL (ref 0–1.2)
BILIRUB UR QL STRIP: NEGATIVE
BUN SERPL-MCNC: 13 MG/DL (ref 8–23)
BUN/CREAT SERPL: 20.6 (ref 7–25)
CALCIUM SPEC-SCNC: 9.7 MG/DL (ref 8.6–10.5)
CHLORIDE SERPL-SCNC: 102 MMOL/L (ref 98–107)
CLARITY UR: CLEAR
CO2 SERPL-SCNC: 25.6 MMOL/L (ref 22–29)
COLOR UR: YELLOW
CREAT SERPL-MCNC: 0.63 MG/DL (ref 0.57–1)
D-LACTATE SERPL-SCNC: 1.2 MMOL/L (ref 0.5–2)
DEPRECATED RDW RBC AUTO: 50.5 FL (ref 37–54)
EGFRCR SERPLBLD CKD-EPI 2021: 86 ML/MIN/1.73
EOSINOPHIL # BLD AUTO: 0.05 10*3/MM3 (ref 0–0.4)
EOSINOPHIL NFR BLD AUTO: 0.7 % (ref 0.3–6.2)
ERYTHROCYTE [DISTWIDTH] IN BLOOD BY AUTOMATED COUNT: 14.8 % (ref 12.3–15.4)
GLOBULIN UR ELPH-MCNC: 2.8 GM/DL
GLUCOSE SERPL-MCNC: 123 MG/DL (ref 65–99)
GLUCOSE UR STRIP-MCNC: NEGATIVE MG/DL
HCT VFR BLD AUTO: 37.8 % (ref 34–46.6)
HGB BLD-MCNC: 12.2 G/DL (ref 12–15.9)
HGB UR QL STRIP.AUTO: NEGATIVE
HOLD SPECIMEN: NORMAL
HOLD SPECIMEN: NORMAL
HYALINE CASTS UR QL AUTO: ABNORMAL /LPF
IMM GRANULOCYTES # BLD AUTO: 0.02 10*3/MM3 (ref 0–0.05)
IMM GRANULOCYTES NFR BLD AUTO: 0.3 % (ref 0–0.5)
KETONES UR QL STRIP: ABNORMAL
LEUKOCYTE ESTERASE UR QL STRIP.AUTO: ABNORMAL
LIPASE SERPL-CCNC: 21 U/L (ref 13–60)
LYMPHOCYTES # BLD AUTO: 0.62 10*3/MM3 (ref 0.7–3.1)
LYMPHOCYTES NFR BLD AUTO: 8.3 % (ref 19.6–45.3)
MCH RBC QN AUTO: 30 PG (ref 26.6–33)
MCHC RBC AUTO-ENTMCNC: 32.3 G/DL (ref 31.5–35.7)
MCV RBC AUTO: 93.1 FL (ref 79–97)
MONOCYTES # BLD AUTO: 0.45 10*3/MM3 (ref 0.1–0.9)
MONOCYTES NFR BLD AUTO: 6 % (ref 5–12)
NEUTROPHILS NFR BLD AUTO: 6.3 10*3/MM3 (ref 1.7–7)
NEUTROPHILS NFR BLD AUTO: 84.3 % (ref 42.7–76)
NITRITE UR QL STRIP: POSITIVE
NRBC BLD AUTO-RTO: 0 /100 WBC (ref 0–0.2)
PH UR STRIP.AUTO: 6.5 [PH] (ref 5–8)
PLATELET # BLD AUTO: 275 10*3/MM3 (ref 140–450)
PMV BLD AUTO: 9.5 FL (ref 6–12)
POTASSIUM SERPL-SCNC: 3.9 MMOL/L (ref 3.5–5.2)
PROT SERPL-MCNC: 7.2 G/DL (ref 6–8.5)
PROT UR QL STRIP: ABNORMAL
RBC # BLD AUTO: 4.06 10*6/MM3 (ref 3.77–5.28)
RBC # UR STRIP: ABNORMAL /HPF
REF LAB TEST METHOD: ABNORMAL
SODIUM SERPL-SCNC: 140 MMOL/L (ref 136–145)
SP GR UR STRIP: 1.03 (ref 1–1.03)
SQUAMOUS #/AREA URNS HPF: ABNORMAL /HPF
UROBILINOGEN UR QL STRIP: ABNORMAL
WBC # UR STRIP: ABNORMAL /HPF
WBC NRBC COR # BLD AUTO: 7.47 10*3/MM3 (ref 3.4–10.8)
WHOLE BLOOD HOLD COAG: NORMAL
WHOLE BLOOD HOLD SPECIMEN: NORMAL

## 2024-12-10 PROCEDURE — 87186 SC STD MICRODIL/AGAR DIL: CPT | Performed by: EMERGENCY MEDICINE

## 2024-12-10 PROCEDURE — 81001 URINALYSIS AUTO W/SCOPE: CPT | Performed by: EMERGENCY MEDICINE

## 2024-12-10 PROCEDURE — 83690 ASSAY OF LIPASE: CPT | Performed by: EMERGENCY MEDICINE

## 2024-12-10 PROCEDURE — 87086 URINE CULTURE/COLONY COUNT: CPT | Performed by: EMERGENCY MEDICINE

## 2024-12-10 PROCEDURE — 87088 URINE BACTERIA CULTURE: CPT | Performed by: EMERGENCY MEDICINE

## 2024-12-10 PROCEDURE — 96374 THER/PROPH/DIAG INJ IV PUSH: CPT

## 2024-12-10 PROCEDURE — 85025 COMPLETE CBC W/AUTO DIFF WBC: CPT

## 2024-12-10 PROCEDURE — 80053 COMPREHEN METABOLIC PANEL: CPT | Performed by: EMERGENCY MEDICINE

## 2024-12-10 PROCEDURE — 25510000001 IOPAMIDOL PER 1 ML: Performed by: EMERGENCY MEDICINE

## 2024-12-10 PROCEDURE — 25010000002 CEFTRIAXONE PER 250 MG: Performed by: EMERGENCY MEDICINE

## 2024-12-10 PROCEDURE — 74177 CT ABD & PELVIS W/CONTRAST: CPT

## 2024-12-10 PROCEDURE — 83605 ASSAY OF LACTIC ACID: CPT | Performed by: EMERGENCY MEDICINE

## 2024-12-10 PROCEDURE — 99285 EMERGENCY DEPT VISIT HI MDM: CPT

## 2024-12-10 RX ORDER — IOPAMIDOL 755 MG/ML
100 INJECTION, SOLUTION INTRAVASCULAR
Status: COMPLETED | OUTPATIENT
Start: 2024-12-10 | End: 2024-12-10

## 2024-12-10 RX ORDER — SODIUM CHLORIDE 0.9 % (FLUSH) 0.9 %
10 SYRINGE (ML) INJECTION AS NEEDED
Status: DISCONTINUED | OUTPATIENT
Start: 2024-12-10 | End: 2024-12-10 | Stop reason: HOSPADM

## 2024-12-10 RX ORDER — CEPHALEXIN 500 MG/1
500 CAPSULE ORAL 2 TIMES DAILY
Qty: 10 CAPSULE | Refills: 0 | Status: SHIPPED | OUTPATIENT
Start: 2024-12-10 | End: 2024-12-15

## 2024-12-10 RX ADMIN — SODIUM CHLORIDE 1000 MG: 9 INJECTION INTRAMUSCULAR; INTRAVENOUS; SUBCUTANEOUS at 18:06

## 2024-12-10 RX ADMIN — IOPAMIDOL 65 ML: 755 INJECTION, SOLUTION INTRAVENOUS at 15:16

## 2024-12-10 NOTE — DISCHARGE INSTRUCTIONS
Use a liquid diet for the next 24 to 48 hours.  Slowly increase your diet from soft foods to puréed.'s to a full diet.  If you have new or worsening symptoms return to the emergency room.  Take all antibiotics as directed.  Should you have new or worsening symptoms return to the emergency room.  You likely have a resolving small bowel obstruction at this time.  As well as a UTI.

## 2024-12-10 NOTE — ED PROVIDER NOTES
Time: 1:30 PM EST  Date of encounter:  12/10/2024  Independent Historian/Clinical History and Information was obtained by:   Patient and Family    History is limited by: N/A    Chief Complaint: Abdominal pain      History of Present Illness:  Patient is a 89 y.o. year old female who presents to the emergency department for evaluation of abdominal pain.  Patient has history of diabetes, obstruction, hypertension.  States that started pain around 7 8 last night.  Did report not really passing much gas but did have some small little bowel movements this morning.  States that she was initially nauseous but the pain is actually gotten a little bit better over the last few hours.  Was recently admitted for a bowel obstruction per the family.  No other complaints this time.      Patient Care Team  Primary Care Provider: Arias Beck MD    Past Medical History:     Allergies   Allergen Reactions    Sulfa Antibiotics Nausea And Vomiting     Past Medical History:   Diagnosis Date    Arthritis     Bowel obstruction     Diabetes mellitus     Hypertension     Tinnitus      Past Surgical History:   Procedure Laterality Date    HYSTERECTOMY      OOPHORECTOMY Bilateral     VEIN LIGATION AND STRIPPING       Family History   Problem Relation Age of Onset    Stroke Father     Heart disease Father     Diabetes Father        Home Medications:  Prior to Admission medications    Medication Sig Start Date End Date Taking? Authorizing Provider   albuterol sulfate  (90 Base) MCG/ACT inhaler Inhale 2 puffs Every 4 (Four) Hours As Needed for Wheezing. 6/4/24   Dottie Avila APRN   amLODIPine (NORVASC) 5 MG tablet  4/25/22   Beverly Lynn MD   Diclofenac Sodium (Voltaren) 1 % gel gel Apply  topically to the appropriate area as directed Every 12 (Twelve) Hours.    Beverly Lynn MD   folic acid (FOLVITE) 1 MG tablet Take 1 tablet by mouth Daily. 12/7/21   Beverly Lynn MD   hydroxychloroquine (PLAQUENIL)  200 MG tablet Take 1 tablet by mouth 2 (Two) Times a Day. 21   Beverly Lynn MD   Levemir FlexTouch 100 UNIT/ML injection Inject 6 Units under the skin into the appropriate area as directed Every Night. 21   Beverly Lynn MD   losartan (COZAAR) 50 MG tablet Take 1 tablet by mouth Daily.  Patient taking differently: Take 2 tablets by mouth Daily. 21   Josef Olmedo MD   metFORMIN (GLUCOPHAGE) 500 MG tablet Take 1 tablet by mouth 2 (Two) Times a Day With Meals. 21   Beverly Lynn MD   methotrexate 2.5 MG tablet Take 10 tablets by mouth 1 (One) Time Per Week.    Beverly Lynn MD   metoprolol tartrate (LOPRESSOR) 25 MG tablet Take 0.5 tablets by mouth 2 (Two) Times a Day for 30 days. 10/11/21 6/4/24  Arias Bradley MD   Pramipexole Dihydrochloride (MIRAPEX PO) Take  by mouth 1 (One) Time Per Week.    Beverly Lynn MD   Spiriva Respimat 2.5 MCG/ACT aerosol solution inhaler Inhale 2 puffs Daily for 90 days. 24  Dottie Avila APRN   budesonide (PULMICORT) 0.5 MG/2ML nebulizer solution Take 2 mL by nebulization 2 (Two) Times a Day for 90 days. Rinse mouth out after each use 6/4/24 12/10/24  Dottie Avila APRN   hydrALAZINE (APRESOLINE) 25 MG tablet  4/25/22 12/10/24  Beverly yLnn MD        Social History:   Social History     Tobacco Use    Smoking status: Former     Current packs/day: 0.00     Average packs/day: 0.3 packs/day for 65.0 years (16.3 ttl pk-yrs)     Types: Cigarettes     Start date:      Quit date: 2017     Years since quittin.9     Passive exposure: Past    Smokeless tobacco: Never    Tobacco comments:     Started smoking as a teenager.   Vaping Use    Vaping status: Never Used   Substance Use Topics    Alcohol use: Never    Drug use: Never         Review of Systems:  Review of Systems   Gastrointestinal:  Positive for abdominal pain.        Physical Exam:  /82 (BP Location: Right arm, Patient  "Position: Sitting)   Pulse 102   Temp 98.9 °F (37.2 °C) (Oral)   Resp 20   Ht 160 cm (63\")   Wt 54.2 kg (119 lb 7.8 oz)   SpO2 97%   BMI 21.17 kg/m²     Physical Exam  Vitals and nursing note reviewed.   Constitutional:       Appearance: Normal appearance.   HENT:      Head: Normocephalic and atraumatic.   Eyes:      General: No scleral icterus.  Cardiovascular:      Rate and Rhythm: Normal rate and regular rhythm.      Heart sounds: Normal heart sounds.   Pulmonary:      Effort: Pulmonary effort is normal.      Breath sounds: Normal breath sounds.   Abdominal:      Palpations: Abdomen is soft.      Tenderness: There is no abdominal tenderness.      Comments: No significant tenderness on exam.  Previous surgical incisions noted that are well-healed   Musculoskeletal:         General: Normal range of motion.      Cervical back: Normal range of motion.   Skin:     Findings: No rash.   Neurological:      General: No focal deficit present.      Mental Status: She is alert.                  Procedures:  Procedures      Medical Decision Making:      Comorbidities that affect care:    Diabetes, Hypertension, bowel obstruction    External Notes reviewed:    Reviewed note from 6/4/2024      The following orders were placed and all results were independently analyzed by me:  Orders Placed This Encounter   Procedures    Urine Culture - Urine,    CT Abdomen Pelvis With Contrast    [place] Draw    Comprehensive Metabolic Panel    Lipase    Urinalysis With Microscopic If Indicated (No Culture) - Urine, Clean Catch    Lactic Acid, Plasma    CBC Auto Differential    Urinalysis, Microscopic Only - Urine, Clean Catch    NPO Diet NPO Type: Strict NPO    Undress & Gown    IP Consult to General Surgery    Insert Peripheral IV    CBC & Differential    Green Top (Gel)    Lavender Top    Gold Top - SST    Light Blue Top       Medications Given in the Emergency Department:  Medications   sodium chloride 0.9 % flush 10 mL (has no " administration in time range)   iopamidol (ISOVUE-370) 76 % injection 100 mL (65 mL Intravenous Given 12/10/24 1516)   cefTRIAXone (ROCEPHIN) in NS 1 gram/10ml IV PUSH syringe (1,000 mg Intravenous Given 12/10/24 1806)        ED Course:    ED Course as of 12/10/24 1824   Tue Dec 10, 2024   1650 Recheck patient.  Patient does report that she has had improvement in her pain.  Still not passing gas.  Will consult general surgery. [MA]   1656 Spoke with Dr. Meyers.  Recommends liquid diet at this time.  If able to tolerate maybe he can go home.  If not would need admission. [MA]   1700 Will p.o. challenge the patient and reassess. [MA]   1821 Recheck patient.  Patient now tolerating p.o. without difficulty.  Will try outpatient therapy with p.o. antibiotics as well as liquid diet for her likely resolving small bowel obstruction [MA]      ED Course User Index  [MA] Robbin Soto MD       Labs:    Lab Results (last 24 hours)       Procedure Component Value Units Date/Time    CBC & Differential [[id number]]  (Abnormal) Collected: 12/10/24 1251    Specimen: Blood Updated: 12/10/24 1258    Narrative:      The following orders were created for panel order CBC & Differential.  Procedure                               Abnormality         Status                     ---------                               -----------         ------                     CBC Auto Differential[[id number]]        Abnormal            Final result                 Please view results for these tests on the individual orders.    Comprehensive Metabolic Panel [[id number]]  (Abnormal) Collected: 12/10/24 1251    Specimen: Blood Updated: 12/10/24 1324     Glucose 123 mg/dL      BUN 13 mg/dL      Creatinine 0.63 mg/dL      Sodium 140 mmol/L      Potassium 3.9 mmol/L      Chloride 102 mmol/L      CO2 25.6 mmol/L      Calcium 9.7 mg/dL      Total Protein 7.2 g/dL      Albumin 4.4 g/dL      ALT (SGPT) 11 U/L      AST (SGOT) 15 U/L      Alkaline Phosphatase 78  U/L      Total Bilirubin 0.4 mg/dL      Globulin 2.8 gm/dL      A/G Ratio 1.6 g/dL      BUN/Creatinine Ratio 20.6     Anion Gap 12.4 mmol/L      eGFR 86.0 mL/min/1.73     Narrative:      GFR Categories in Chronic Kidney Disease (CKD)      GFR Category          GFR (mL/min/1.73)    Interpretation  G1                     90 or greater         Normal or high (1)  G2                      60-89                Mild decrease (1)  G3a                   45-59                Mild to moderate decrease  G3b                   30-44                Moderate to severe decrease  G4                    15-29                Severe decrease  G5                    14 or less           Kidney failure          (1)In the absence of evidence of kidney disease, neither GFR category G1 or G2 fulfill the criteria for CKD.    eGFR calculation 2021 CKD-EPI creatinine equation, which does not include race as a factor    Lipase [[id number]]  (Normal) Collected: 12/10/24 1251    Specimen: Blood Updated: 12/10/24 1324     Lipase 21 U/L     Lactic Acid, Plasma [[id number]]  (Normal) Collected: 12/10/24 1251    Specimen: Blood Updated: 12/10/24 1320     Lactate 1.2 mmol/L     CBC Auto Differential [[id number]]  (Abnormal) Collected: 12/10/24 1251    Specimen: Blood Updated: 12/10/24 1258     WBC 7.47 10*3/mm3      RBC 4.06 10*6/mm3      Hemoglobin 12.2 g/dL      Hematocrit 37.8 %      MCV 93.1 fL      MCH 30.0 pg      MCHC 32.3 g/dL      RDW 14.8 %      RDW-SD 50.5 fl      MPV 9.5 fL      Platelets 275 10*3/mm3      Neutrophil % 84.3 %      Lymphocyte % 8.3 %      Monocyte % 6.0 %      Eosinophil % 0.7 %      Basophil % 0.4 %      Immature Grans % 0.3 %      Neutrophils, Absolute 6.30 10*3/mm3      Lymphocytes, Absolute 0.62 10*3/mm3      Monocytes, Absolute 0.45 10*3/mm3      Eosinophils, Absolute 0.05 10*3/mm3      Basophils, Absolute 0.03 10*3/mm3      Immature Grans, Absolute 0.02 10*3/mm3      nRBC 0.0 /100 WBC     Urinalysis With Microscopic If  Indicated (No Culture) - Urine, Clean Catch [[id number]]  (Abnormal) Collected: 12/10/24 1542    Specimen: Urine, Clean Catch Updated: 12/10/24 1609     Color, UA Yellow     Appearance, UA Clear     pH, UA 6.5     Specific Gravity, UA 1.027     Glucose, UA Negative     Ketones, UA 15 mg/dL (1+)     Bilirubin, UA Negative     Blood, UA Negative     Protein, UA Trace     Leuk Esterase, UA Small (1+)     Nitrite, UA Positive     Urobilinogen, UA 1.0 E.U./dL    Urinalysis, Microscopic Only - Urine, Clean Catch [[id number]]  (Abnormal) Collected: 12/10/24 1542    Specimen: Urine, Clean Catch Updated: 12/10/24 1609     RBC, UA 3-5 /HPF      WBC, UA 3-5 /HPF      Bacteria, UA 4+ /HPF      Squamous Epithelial Cells, UA 3-6 /HPF      Hyaline Casts, UA 0-2 /LPF      Methodology Automated Microscopy    Urine Culture - Urine, Urine, Clean Catch [[id number]] Collected: 12/10/24 1542    Specimen: Urine, Clean Catch Updated: 12/10/24 1656             Imaging:    CT Abdomen Pelvis With Contrast    Result Date: 12/10/2024  CT ABDOMEN PELVIS W CONTRAST Date of Exam: 12/10/2024 3:13 PM EST Indication: abdominal pain. Comparison: 10/11/2021 Technique: Axial CT images were obtained of the abdomen and pelvis after the uneventful intravenous administration of iodinated contrast. Reconstructed coronal and sagittal images were also obtained. Automated exposure control and iterative construction methods were used. Findings: The lung bases are clear. The liver and spleen have a normal appearance. The gallbladder is surgically absent. There is no biliary dilatation. The pancreas is normal. There is a stable left adrenal nodule measuring 13 mm in diameter. There are multiple bilateral renal cysts present. There is no evidence of stones or solid renal masses. There is a large amount of fecal residue within the colon. There are dilated small bowel loops identified primarily in the right lower quadrant with a few air-fluid levels. There is extensive  colonic diverticular disease without evidence of diverticulitis. The bladder is moderately distended with at least one bladder diverticulum extending posteriorly. The uterus is absent. There is no significant distention of the rectosigmoid. There is no lymphadenopathy. There is atherosclerotic disease. Degenerative changes are present in the lumbar spine. There is grade 1 anterolisthesis of L4 on L5. This appears to be on the basis of advanced facet disease. No destructive bone lesions are identified.     Impression: 1.Dilated small bowel loops in the right lower quadrant with a few air-fluid levels. This may represent an early or partial small bowel obstruction. 2.Extensive colonic diverticular disease without evidence of diverticulitis. 3.Moderately distended bladder with at least one bladder diverticulum. 4.Stable left adrenal nodule. 5.Cholecystectomy and hysterectomy. Electronically Signed: Matheus Mariee MD  12/10/2024 3:28 PM EST  Workstation ID: [id number]       Differential Diagnosis and Discussion:    Abdominal Pain: Based on the patient's signs and symptoms, I considered abdominal aortic aneurysm, small bowel obstruction, pancreatitis, acute cholecystitis, acute appendecitis, peptic ulcer disease, gastritis, colitis, endocrine disorders, irritable bowel syndrome and other differential diagnosis an etiology of the patient's abdominal pain.    PROCEDURES:    All labs were reviewed and interpreted by me.  CT scan radiology impression was interpreted by me.    MDM     Patient is 89-year-old female who presents with complaints of abdominal pain.  Reports has been ongoing since around 7 or 8 last night.  Has a history of bowel obstructions.  CT shows may be a partial small bowel obstruction.  She reports that her pain is has significantly improved.  Baseline her story is likely that she had a small bowel obstruction that is now resolving.  She does state this is normally how it feels when she has had a bowel  obstruction.  She also was found to have a UTI.  I did speak with general surgery who stated if she was able to tolerate liquid she could be discharged with liquid diet.  She has tolerated liquids here without any difficulty.  Her pain is now gone.  Will DC with antibiotics and very strong return precautions.  Will DC.                Patient Care Considerations:          Consultants/Shared Management Plan:    Consultant: I have discussed the case with Dr. Meyers who states if able to tolerate liquids can be followed up as an outpatient.  Slowly increase diet    Social Determinants of Health:    Patient is independent, reliable, and has access to care.       Disposition and Care Coordination:    Discharged: I considered escalation of care by admitting this patient to the hospital, however patient is tolerating liquids and likely has an improving small bowel obstruction    I have explained the patient´s condition, diagnoses and treatment plan based on the information available to me at this time. I have answered questions and addressed any concerns. The patient has a good  understanding of the patient´s diagnosis, condition, and treatment plan as can be expected at this point. The vital signs have been stable. The patient´s condition is stable and appropriate for discharge from the emergency department.      The patient will pursue further outpatient evaluation with the primary care physician or other designated or consulting physician as outlined in the discharge instructions. They are agreeable to this plan of care and follow-up instructions have been explained in detail. The patient has received these instructions in written format and have expressed an understanding of the discharge instructions. The patient is aware that any significant change in condition or worsening of symptoms should prompt an immediate return to this or the closest emergency department or call to 911.      Final diagnoses:   Small bowel  obstruction   Urinary tract infection without hematuria, site unspecified        ED Disposition       ED Disposition   Discharge    Condition   Stable    Comment   --               This medical record created using voice recognition software.             Robbin Soto MD  12/10/24 [id number]

## 2024-12-12 LAB — BACTERIA SPEC AEROBE CULT: ABNORMAL

## 2025-05-19 ENCOUNTER — OFFICE VISIT (OUTPATIENT)
Dept: ORTHOPEDIC SURGERY | Facility: CLINIC | Age: 89
End: 2025-05-19
Payer: MEDICARE

## 2025-05-19 VITALS
HEART RATE: 71 BPM | BODY MASS INDEX: 21.09 KG/M2 | DIASTOLIC BLOOD PRESSURE: 69 MMHG | OXYGEN SATURATION: 95 % | WEIGHT: 119 LBS | HEIGHT: 63 IN | SYSTOLIC BLOOD PRESSURE: 166 MMHG

## 2025-05-19 DIAGNOSIS — M17.11 OSTEOARTHRITIS OF RIGHT KNEE, UNSPECIFIED OSTEOARTHRITIS TYPE: ICD-10-CM

## 2025-05-19 DIAGNOSIS — M25.561 RIGHT KNEE PAIN, UNSPECIFIED CHRONICITY: Primary | ICD-10-CM

## 2025-05-19 RX ADMIN — TRIAMCINOLONE ACETONIDE 40 MG: 40 INJECTION, SUSPENSION INTRA-ARTICULAR; INTRAMUSCULAR at 15:19

## 2025-05-19 RX ADMIN — LIDOCAINE HYDROCHLORIDE 5 ML: 10 INJECTION, SOLUTION INFILTRATION; PERINEURAL at 15:19

## 2025-05-19 NOTE — PROGRESS NOTES
"Chief Complaint  Initial Evaluation of the Right Knee     Subjective      Annita Rodriguez presents to [facility] for initial evaluation of the right knee. She has pain around the entire knee.  She has had pain for years and has tried injections.  The pain has increased since December.  She has pain with prolonged standing, ambulation and stairs.     Allergies   Allergen Reactions    Sulfa Antibiotics Nausea And Vomiting        Social History     Socioeconomic History    Marital status:    Tobacco Use    Smoking status: Former     Current packs/day: 0.00     Average packs/day: 0.3 packs/day for 65.0 years (16.3 ttl pk-yrs)     Types: Cigarettes     Start date:      Quit date: 2017     Years since quittin.3     Passive exposure: Past    Smokeless tobacco: Never    Tobacco comments:     Started smoking as a teenager.   Vaping Use    Vaping status: Never Used   Substance and Sexual Activity    Alcohol use: Never    Drug use: Never    Sexual activity: Not Currently        I reviewed the patient's chief complaint, history of present illness, review of systems, past medical history, surgical history, family history, social history, medications, and allergy list.     Review of Systems     Constitutional: Denies fevers, chills, weight loss  Cardiovascular: Denies chest pain, shortness of breath  Skin: Denies rashes, acute skin changes  Neurologic: Denies headache, loss of consciousness        Vital Signs:   /69   Pulse 71   Ht 160 cm (63\")   Wt 54 kg (119 lb)   SpO2 95%   BMI 21.08 kg/m²          Physical Exam  General: Alert. No acute distress    Ortho Exam        RIGHT KNEE Flexion 110. Extension -3. Stable to varus/valgus stress. Stable to anterior/posterior drawer. Neurovascularly intact. Negative Ly. Negative Lachman. Positive EHL, FHL, HS and TA. Sensation intact to light touch all 5 nerves of the foot. Ambulates with Antalgic gait. Patella is well " tracking. Calf supple, non-tender. Positive tenderness to the medial joint line. Positive tenderness to the lateral joint line. Positive Crepitus. Good strength to hamstrings, quadriceps, dorsiflexors, and plantar flexors.  Knee Extensor Mechanism intact    Large Joint Arthrocentesis: R knee  Date/Time: 5/19/2025 3:19 PM  Consent given by: patient  Site marked: site marked  Timeout: Immediately prior to procedure a time out was called to verify the correct patient, procedure, equipment, support staff and site/side marked as required   Supporting Documentation  Indications: pain   Procedure Details  Location: knee - R knee  Preparation: Patient was prepped and draped in the usual sterile fashion  Needle gauge: 21 G.  Medications administered: 5 mL lidocaine 1 %; 40 mg triamcinolone acetonide 40 MG/ML  Patient tolerance: patient tolerated the procedure well with no immediate complications      This injection documentation was Scribed for Josef Bernabe MD by Tammy Wheeler, FAITH.  05/19/25   15:19 EDT     Imaging Results (Most Recent)       Procedure Component Value Units Date/Time    XR Knee 3 View Right [[id number]] Resulted: 05/19/25 1443     Updated: 05/19/25 1451             Result Review :     X-Ray Report:  Right knee X-Ray  Indication: Evaluation of the right knee  AP/Lateral and [place] view(s)  Findings: Advanced degenerative end stage bone on bone arthritis.    Prior studies available for comparison: no        Assessment and Plan     Diagnoses and all orders for this visit:    1. Right knee pain, unspecified chronicity (Primary)  -     XR Knee 3 View Right    2. Osteoarthritis of right knee, unspecified osteoarthritis type        Discussed the treatment plan with the patient. I reviewed the X-rays that were obtained today with the patient.    Discussed steroid and Visco supplementation of the right knee.      She decided to have a right knee steroid injection.  She tolerated the injection well.      Discussed with the patient that due to the steroid injection given today in the office they may see an increase in blood sugar for a few days. Advised patient to monitor sugar after receiving the injection.     Discussed possibility of a reaction from the injection.  Discussed the possibility that the injection may not completely improve or remove the pain.  Discussed the risk of infection.      Call or return if worsening symptoms.    Follow Up     PRN      Patient was given instructions and counseling regarding her condition or for health maintenance advice. Please see specific information pulled into the AVS if appropriate.     Scribed for Josef Bernabe MD by Elsa Damon MA.  05/19/25   14:53 EDT    I have personally performed the services described in this document as scribed by the above individual and it is both accurate and complete. Josef Bernabe MD 05/20/25

## 2025-05-20 RX ORDER — LIDOCAINE HYDROCHLORIDE 10 MG/ML
5 INJECTION, SOLUTION INFILTRATION; PERINEURAL
Status: COMPLETED | OUTPATIENT
Start: 2025-05-19 | End: 2025-05-19

## 2025-05-20 RX ORDER — TRIAMCINOLONE ACETONIDE 40 MG/ML
40 INJECTION, SUSPENSION INTRA-ARTICULAR; INTRAMUSCULAR
Status: COMPLETED | OUTPATIENT
Start: 2025-05-19 | End: 2025-05-19

## 2025-05-20 RX ORDER — INSULIN GLARGINE 100 [IU]/ML
10 INJECTION, SOLUTION SUBCUTANEOUS DAILY
COMMUNITY

## 2025-05-20 RX ORDER — BUDESONIDE 0.5 MG/2ML
0.5 INHALANT ORAL
COMMUNITY

## 2025-05-20 RX ORDER — ARFORMOTEROL TARTRATE 15 UG/2ML
15 SOLUTION RESPIRATORY (INHALATION)
COMMUNITY

## 2025-06-15 NOTE — PROGRESS NOTES
Primary Care Provider  Arias Beck MD     Referring Provider  No ref. provider found     Chief Complaint  COPD, Asthma, Cough, Shortness of Breath, Wheezing, and Follow-up    Subjective          History of Presenting Illness  Patient is a 89-year-old female, patient of Dr. Ferrer who presents for management of asthma/COPD overlap syndrome and obstructive sleep apnea who presents for a follow-up visit today.      Patient states over the last week she has been having increasing productive cough, shortness of breath, and wheezing.  Patient states that she is taking Brovana, Pulmicort, and Spiriva every day as prescribed and uses albuterol inhaler as needed.  Patient states that she does have a CPAP machine, however it has been recalled and she has not used her CPAP in years and does not want to use a CPAP machine at this time.  Patient denies any morning headaches or excessive daytime sleepiness.  Patient states that she is under the care of Dr. Gutierrez, rheumatologist for rheumatoid arthritis and is on methotrexate and taking daily folic acid.        Patient denies any recent travel.  Patient denies any known exposure to any ill contacts. Patient denies fever, chills, night sweats, swollen glands in the head and neck, unintentional weight loss, hemoptysis,  dysphagia, chest pain, palpitations, chest tightness, abdominal pain, nausea, vomiting, and diarrhea.  Patient also denies any myalgias, changes in sense of taste and/or smell, sore throat, any other coronavirus or flu-like symptoms.  Patient denies any leg swelling, orthopnea, paroxysmal nocturnal dyspnea.  Patient is able to perform activities of daily living.        Review of Systems     Family History   Problem Relation Age of Onset    Stroke Father     Heart disease Father     Diabetes Father         Social History     Socioeconomic History    Marital status:    Tobacco Use    Smoking status: Former     Current packs/day: 0.00     Average  packs/day: 0.3 packs/day for 65.0 years (16.3 ttl pk-yrs)     Types: Cigarettes     Start date:      Quit date: 2017     Years since quittin.4     Passive exposure: Past    Smokeless tobacco: Never    Tobacco comments:     Started smoking as a teenager.   Vaping Use    Vaping status: Never Used   Substance and Sexual Activity    Alcohol use: Never    Drug use: Never    Sexual activity: Not Currently        Past Medical History:   Diagnosis Date    Arthritis     Bowel obstruction     Diabetes mellitus     Hypertension     Tinnitus         Immunization History   Administered Date(s) Administered    COVID-19 (PFIZER) BIVALENT 12+YRS 2022    COVID-19 (PFIZER) Purple Cap Monovalent 2021, 2021    Flu Vaccine Intradermal Quad 18-64YR 10/05/2022    Fluzone (or Fluarix & Flulaval for VFC) >6mos 2018    Fluzone High-Dose 65+YRS 2020    Fluzone High-Dose 65+yrs 2023    Pneumococcal Polysaccharide (PPSV23) 10/20/2003    Td (TDVAX) 2001       Allergies   Allergen Reactions    Sulfa Antibiotics Nausea And Vomiting          Current Outpatient Medications:     albuterol sulfate  (90 Base) MCG/ACT inhaler, Inhale 2 puffs Every 4 (Four) Hours As Needed for Wheezing., Disp: 54 g, Rfl: 1    amLODIPine (NORVASC) 5 MG tablet, , Disp: , Rfl:     arformoterol (BROVANA) 15 MCG/2ML nebulizer solution, Take 2 mL by nebulization 2 (Two) Times a Day for 90 days., Disp: 360 mL, Rfl: 1    budesonide (PULMICORT) 0.5 MG/2ML nebulizer solution, Take 2 mL by nebulization 2 (Two) Times a Day for 90 days. Rinse mouth out after each use, Disp: 360 mL, Rfl: 1    Diclofenac Sodium (Voltaren) 1 % gel gel, Apply  topically to the appropriate area as directed Every 12 (Twelve) Hours., Disp: , Rfl:     folic acid (FOLVITE) 1 MG tablet, Take 1 tablet by mouth Daily., Disp: , Rfl:     hydroxychloroquine (PLAQUENIL) 200 MG tablet, Take 1 tablet by mouth 2 (Two) Times a Day., Disp: , Rfl:     insulin  glargine (LANTUS, SEMGLEE) 100 UNIT/ML injection, Inject 10 Units under the skin into the appropriate area as directed Daily., Disp: , Rfl:     losartan (COZAAR) 50 MG tablet, Take 1 tablet by mouth Daily. (Patient taking differently: Take 2 tablets by mouth Daily.), Disp: 30 tablet, Rfl: 0    metFORMIN (GLUCOPHAGE) 500 MG tablet, Take 1 tablet by mouth 2 (Two) Times a Day With Meals., Disp: , Rfl:     methotrexate 2.5 MG tablet, Take 10 tablets by mouth 1 (One) Time Per Week., Disp: , Rfl:     metoprolol tartrate (LOPRESSOR) 25 MG tablet, Take 0.5 tablets by mouth 2 (Two) Times a Day for 30 days., Disp: 30 tablet, Rfl: 0    multivitamin with minerals (CENTRUM ADULTS PO), Take 1 tablet by mouth Daily., Disp: , Rfl:     Spiriva Respimat 2.5 MCG/ACT aerosol solution inhaler, Inhale 2 puffs Daily for 90 days., Disp: 3 each, Rfl: 1    amoxicillin-clavulanate (AUGMENTIN) 875-125 MG per tablet, Take 1 tablet by mouth 2 (Two) Times a Day for 7 days., Disp: 14 tablet, Rfl: 0    Levemir FlexTouch 100 UNIT/ML injection, Inject 6 Units under the skin into the appropriate area as directed Every Night. (Patient not taking: Reported on 6/16/2025), Disp: , Rfl:     Pramipexole Dihydrochloride (MIRAPEX PO), Take  by mouth 1 (One) Time Per Week. (Patient not taking: Reported on 6/16/2025), Disp: , Rfl:     predniSONE (DELTASONE) 20 MG tablet, Take 2 tablets by mouth Daily., Disp: 14 tablet, Rfl: 0     Objective     Physical Exam  Vital Signs:   WDWN, Alert, NAD.    HEENT:  PERRL, EOMI.  OP, nares clear, no sinus tenderness  Neck:  Supple, no JVD, no thyromegaly.  Lymph: no axillary, cervical, supraclavicular lymphadenopathy noted bilaterally  Chest: mildly decreased breath sounds throughout with diffuse expiratory wheezes. Normal work of breathing noted.  Patient is able speak full sentences without difficulty.  CV: RRR, no MGR, pulses 2+, equal.  Abd:  Soft, NT, ND, + BS, no HSM  EXT:  no clubbing, no cyanosis, no edema, no joint  "tenderness  Neuro:  A&Ox3, CN grossly intact, no focal deficits.  Skin: No rashes or lesions noted.    /76 (BP Location: Left arm, Patient Position: Sitting, Cuff Size: Small Adult)   Pulse 92   Temp 98.2 °F (36.8 °C) (Oral)   Resp 16   Ht 160 cm (62.99\")   Wt 53.3 kg (117 lb 9.6 oz)   SpO2 91% Comment: ROOM AIR  BMI 20.84 kg/m²         Result Review :   I have reviewed my last office visit note.     Procedures:           Assessment and Plan      Assessment:  1.  Asthma/COPD overlap syndrome with acute exacerbation.  Patient is on triple nebulizer/inhaler therapy.  2.  Obstructive sleep apnea. Patient has not used CPAP machine for several years and declines using CPAP machine.  3.  Rheumatoid arthritis.  Patient is on methotrexate managed by Dr. Gutierrez, rheumatologist.   4.  GERD.  Patient is on a PPI.  5.  Dyspnea.  6.  Cough.  7.  Wheezing.  8.  Tobacco abuse of cigarettes in remission.  Not eligible for lung cancer screening.        Plan:  1.  For COPD exacerbation, will start patient on Augmentin 875 mg twice daily x 7 days.  Will also start patient on a prednisone burst.  Expectations and course of treatment discussed with the patient. How to take medications and possible side effects of medications discussed with the patient. Patient verbalized understanding and compliance.  Patient is advised to monitor blood sugars closely while on prednisone.  Blood sugar parameters discussed with the patient in the office today.  Patient is advised to call the office and/or go to the ER if her blood sugar becomes elevated and/or remains elevated and/or develops any new or worsening symptoms.  2.  Will order a chest x-ray, CBC, CMP, IgE level, and respiratory culture for further evaluation.  Patient declines COVID and flu testing.  3.  Continue Brovana, Pulmicort, and Spiriva as prescribed and rinse mouth out after each use.  4.  Continue albuterol inhaler as needed.  5.  Patient continues to decline a CPAP " machine.  I discussed the complications of untreated sleep apnea including effects on hypertension, diabetes mellitus and nonrestorative sleep with hypersomnia which can increase risk for motor vehicle accidents.  Untreated sleep apnea is also a risk factor for development of pulmonary hypertension, atrial fibrillation, and stroke.  Patient verbalized understanding.    6.  Methotrexate management per rheumatology.  Patient is advised to continue daily folic acid.  Patient to follow-up with rheumatologist as scheduled.  7.  For GERD,  patient is also advised to sleep with the head of the bed elevated and do not eat 3-4 hours prior to bedtime.  8.  Vaccination status: Patient reports they did receive 3 Covid vaccines. Patient is advised to check on the status of pneumonia vaccine with her primary care provider and to notify our office. Patient is advised to receive the new flu vaccine when it becomes available.   9.  Smoking status: Patient is a former cigarette smoker.  Not eligible for lung cancer screening.  10.  Patient to call the office, 911, or go to the ER with new or worsening symptoms.  11.  Follow-up in 4 weeks, sooner if needed.           Follow Up   Return in about 4 weeks (around 7/14/2025).  Patient was given instructions and counseling regarding her condition or for health maintenance advice. Please see specific information pulled into the AVS if appropriate.

## 2025-06-16 ENCOUNTER — HOSPITAL ENCOUNTER (OUTPATIENT)
Facility: HOSPITAL | Age: 89
Discharge: HOME | End: 2025-06-16
Payer: MEDICARE

## 2025-06-16 ENCOUNTER — LAB (OUTPATIENT)
Facility: HOSPITAL | Age: 89
End: 2025-06-16
Payer: MEDICARE

## 2025-06-16 ENCOUNTER — OFFICE VISIT (OUTPATIENT)
Dept: PULMONOLOGY | Facility: CLINIC | Age: 89
End: 2025-06-16
Payer: MEDICARE

## 2025-06-16 VITALS
BODY MASS INDEX: 20.84 KG/M2 | RESPIRATION RATE: 16 BRPM | SYSTOLIC BLOOD PRESSURE: 150 MMHG | WEIGHT: 117.6 LBS | DIASTOLIC BLOOD PRESSURE: 76 MMHG | OXYGEN SATURATION: 91 % | HEART RATE: 92 BPM | HEIGHT: 63 IN | TEMPERATURE: 98.2 F

## 2025-06-16 DIAGNOSIS — M06.9 RHEUMATOID ARTHRITIS, INVOLVING UNSPECIFIED SITE, UNSPECIFIED WHETHER RHEUMATOID FACTOR PRESENT: ICD-10-CM

## 2025-06-16 DIAGNOSIS — K21.9 GASTROESOPHAGEAL REFLUX DISEASE, UNSPECIFIED WHETHER ESOPHAGITIS PRESENT: ICD-10-CM

## 2025-06-16 DIAGNOSIS — F17.201 TOBACCO ABUSE, IN REMISSION: ICD-10-CM

## 2025-06-16 DIAGNOSIS — J44.89 ASTHMA-COPD OVERLAP SYNDROME: ICD-10-CM

## 2025-06-16 DIAGNOSIS — G47.33 OSA (OBSTRUCTIVE SLEEP APNEA): ICD-10-CM

## 2025-06-16 DIAGNOSIS — J44.89 ASTHMA-COPD OVERLAP SYNDROME: Primary | ICD-10-CM

## 2025-06-16 LAB
ALBUMIN SERPL-MCNC: 4.6 G/DL (ref 3.5–5.2)
ALBUMIN/GLOB SERPL: 1.6 G/DL
ALP SERPL-CCNC: 79 U/L (ref 39–117)
ALT SERPL W P-5'-P-CCNC: 14 U/L (ref 1–33)
ANION GAP SERPL CALCULATED.3IONS-SCNC: 12.2 MMOL/L (ref 5–15)
AST SERPL-CCNC: 19 U/L (ref 1–32)
BASOPHILS # BLD AUTO: 0.05 10*3/MM3 (ref 0–0.2)
BASOPHILS NFR BLD AUTO: 1.1 % (ref 0–1.5)
BILIRUB SERPL-MCNC: 0.5 MG/DL (ref 0–1.2)
BUN SERPL-MCNC: 20 MG/DL (ref 8–23)
BUN/CREAT SERPL: 27.4 (ref 7–25)
CALCIUM SPEC-SCNC: 9.8 MG/DL (ref 8.6–10.5)
CHLORIDE SERPL-SCNC: 104 MMOL/L (ref 98–107)
CO2 SERPL-SCNC: 24.8 MMOL/L (ref 22–29)
CREAT SERPL-MCNC: 0.73 MG/DL (ref 0.57–1)
DEPRECATED RDW RBC AUTO: 47.3 FL (ref 37–54)
EGFRCR SERPLBLD CKD-EPI 2021: 79.2 ML/MIN/1.73
EOSINOPHIL # BLD AUTO: 0.56 10*3/MM3 (ref 0–0.4)
EOSINOPHIL NFR BLD AUTO: 12.5 % (ref 0.3–6.2)
ERYTHROCYTE [DISTWIDTH] IN BLOOD BY AUTOMATED COUNT: 14.2 % (ref 12.3–15.4)
GLOBULIN UR ELPH-MCNC: 2.9 GM/DL
GLUCOSE SERPL-MCNC: 92 MG/DL (ref 65–99)
HCT VFR BLD AUTO: 38.2 % (ref 34–46.6)
HGB BLD-MCNC: 12.2 G/DL (ref 12–15.9)
IMM GRANULOCYTES # BLD AUTO: 0.01 10*3/MM3 (ref 0–0.05)
IMM GRANULOCYTES NFR BLD AUTO: 0.2 % (ref 0–0.5)
LYMPHOCYTES # BLD AUTO: 1.27 10*3/MM3 (ref 0.7–3.1)
LYMPHOCYTES NFR BLD AUTO: 28.4 % (ref 19.6–45.3)
MCH RBC QN AUTO: 29.2 PG (ref 26.6–33)
MCHC RBC AUTO-ENTMCNC: 31.9 G/DL (ref 31.5–35.7)
MCV RBC AUTO: 91.4 FL (ref 79–97)
MONOCYTES # BLD AUTO: 0.45 10*3/MM3 (ref 0.1–0.9)
MONOCYTES NFR BLD AUTO: 10.1 % (ref 5–12)
NEUTROPHILS NFR BLD AUTO: 2.13 10*3/MM3 (ref 1.7–7)
NEUTROPHILS NFR BLD AUTO: 47.7 % (ref 42.7–76)
NRBC BLD AUTO-RTO: 0 /100 WBC (ref 0–0.2)
PLATELET # BLD AUTO: 205 10*3/MM3 (ref 140–450)
PMV BLD AUTO: 10.6 FL (ref 6–12)
POTASSIUM SERPL-SCNC: 4.5 MMOL/L (ref 3.5–5.2)
PROT SERPL-MCNC: 7.5 G/DL (ref 6–8.5)
RBC # BLD AUTO: 4.18 10*6/MM3 (ref 3.77–5.28)
SODIUM SERPL-SCNC: 141 MMOL/L (ref 136–145)
WBC NRBC COR # BLD AUTO: 4.47 10*3/MM3 (ref 3.4–10.8)

## 2025-06-16 PROCEDURE — 82785 ASSAY OF IGE: CPT

## 2025-06-16 PROCEDURE — 71046 X-RAY EXAM CHEST 2 VIEWS: CPT

## 2025-06-16 PROCEDURE — 85025 COMPLETE CBC W/AUTO DIFF WBC: CPT

## 2025-06-16 PROCEDURE — 1160F RVW MEDS BY RX/DR IN RCRD: CPT | Performed by: NURSE PRACTITIONER

## 2025-06-16 PROCEDURE — 36415 COLL VENOUS BLD VENIPUNCTURE: CPT

## 2025-06-16 PROCEDURE — G2211 COMPLEX E/M VISIT ADD ON: HCPCS | Performed by: NURSE PRACTITIONER

## 2025-06-16 PROCEDURE — 1159F MED LIST DOCD IN RCRD: CPT | Performed by: NURSE PRACTITIONER

## 2025-06-16 PROCEDURE — 80053 COMPREHEN METABOLIC PANEL: CPT

## 2025-06-16 PROCEDURE — 99214 OFFICE O/P EST MOD 30 MIN: CPT | Performed by: NURSE PRACTITIONER

## 2025-06-16 RX ORDER — BUDESONIDE 0.5 MG/2ML
0.5 INHALANT ORAL
Qty: 360 ML | Refills: 1 | Status: SHIPPED | OUTPATIENT
Start: 2025-06-16 | End: 2025-09-14

## 2025-06-16 RX ORDER — ALBUTEROL SULFATE 90 UG/1
2 INHALANT RESPIRATORY (INHALATION) EVERY 4 HOURS PRN
Qty: 54 G | Refills: 1 | Status: SHIPPED | OUTPATIENT
Start: 2025-06-16

## 2025-06-16 RX ORDER — ARFORMOTEROL TARTRATE 15 UG/2ML
15 SOLUTION RESPIRATORY (INHALATION)
Qty: 360 ML | Refills: 1 | Status: SHIPPED | OUTPATIENT
Start: 2025-06-16 | End: 2025-09-14

## 2025-06-16 RX ORDER — PREDNISONE 20 MG/1
40 TABLET ORAL DAILY
Qty: 14 TABLET | Refills: 0 | Status: SHIPPED | OUTPATIENT
Start: 2025-06-16

## 2025-06-16 RX ORDER — TIOTROPIUM BROMIDE INHALATION SPRAY 3.12 UG/1
2 SPRAY, METERED RESPIRATORY (INHALATION)
Qty: 3 EACH | Refills: 1 | Status: SHIPPED | OUTPATIENT
Start: 2025-06-16 | End: 2025-09-14

## 2025-06-19 LAB — IGE SERPL-ACNC: 85 IU/ML (ref 6–495)

## 2025-07-17 ENCOUNTER — OFFICE VISIT (OUTPATIENT)
Dept: PULMONOLOGY | Facility: CLINIC | Age: 89
End: 2025-07-17
Payer: MEDICARE

## 2025-07-17 ENCOUNTER — TELEPHONE (OUTPATIENT)
Dept: PULMONOLOGY | Facility: CLINIC | Age: 89
End: 2025-07-17

## 2025-07-17 VITALS
OXYGEN SATURATION: 93 % | DIASTOLIC BLOOD PRESSURE: 72 MMHG | WEIGHT: 120.2 LBS | HEIGHT: 63 IN | BODY MASS INDEX: 21.3 KG/M2 | SYSTOLIC BLOOD PRESSURE: 146 MMHG | HEART RATE: 77 BPM | RESPIRATION RATE: 16 BRPM | TEMPERATURE: 98 F

## 2025-07-17 DIAGNOSIS — R06.89 AIRWAY CLEARANCE IMPAIRMENT: ICD-10-CM

## 2025-07-17 DIAGNOSIS — J44.89 ASTHMA-COPD OVERLAP SYNDROME: Primary | ICD-10-CM

## 2025-07-17 DIAGNOSIS — F17.201 TOBACCO ABUSE, IN REMISSION: ICD-10-CM

## 2025-07-17 DIAGNOSIS — K21.9 GASTROESOPHAGEAL REFLUX DISEASE, UNSPECIFIED WHETHER ESOPHAGITIS PRESENT: ICD-10-CM

## 2025-07-17 DIAGNOSIS — R05.3 PERSISTENT COUGH: ICD-10-CM

## 2025-07-17 DIAGNOSIS — R06.00 DYSPNEA, UNSPECIFIED TYPE: ICD-10-CM

## 2025-07-17 DIAGNOSIS — R06.2 WHEEZING: ICD-10-CM

## 2025-07-17 DIAGNOSIS — M06.9 RHEUMATOID ARTHRITIS, INVOLVING UNSPECIFIED SITE, UNSPECIFIED WHETHER RHEUMATOID FACTOR PRESENT: ICD-10-CM

## 2025-07-17 DIAGNOSIS — G47.33 OSA (OBSTRUCTIVE SLEEP APNEA): ICD-10-CM

## 2025-07-17 PROBLEM — R05.9 COUGH: Status: ACTIVE | Noted: 2025-07-17

## 2025-07-17 RX ORDER — SODIUM CHLORIDE FOR INHALATION 3 %
4 VIAL, NEBULIZER (ML) INHALATION
Qty: 240 ML | Refills: 5 | Status: SHIPPED | OUTPATIENT
Start: 2025-07-17 | End: 2025-08-16

## 2025-07-17 RX ORDER — PREDNISONE 20 MG/1
40 TABLET ORAL DAILY
Qty: 14 TABLET | Refills: 0 | Status: SHIPPED | OUTPATIENT
Start: 2025-07-17

## 2025-07-17 RX ORDER — CIPROFLOXACIN 250 MG/1
250 TABLET, FILM COATED ORAL 2 TIMES DAILY
COMMUNITY
Start: 2025-07-12

## 2025-07-17 RX ORDER — ALBUTEROL SULFATE 0.83 MG/ML
2.5 SOLUTION RESPIRATORY (INHALATION) EVERY 4 HOURS PRN
Qty: 180 EACH | Refills: 5 | Status: SHIPPED | OUTPATIENT
Start: 2025-07-17 | End: 2025-08-16

## 2025-07-17 NOTE — PROGRESS NOTES
Primary Care Provider  Arias Beck MD     Referring Provider  No ref. provider found     Chief Complaint  COPD, Asthma, Follow-up (4 week. ), Cough, Shortness of Breath, and Wheezing    Subjective          History of Presenting Illness  Patient is a 89-year-old female, patient of Dr. Bill's who presents for management of asthma/COPD overlap syndrome and obstructive sleep apnea who presents for a follow-up visit today.   Patient's daughter is present with the patient in the office today.    Last office visit patient reported that she had been experiencing an increasing productive cough, shortness of breath, and wheezing.  Patient was prescribed Augmentin and a prednisone burst for COPD exacerbation.  A chest x-ray, CBC, CMP, IgE level, and a respiratory culture were ordered for further evaluation.  Patient had chest x-ray and labs completed on 6/16/2025.  Chest x-ray report states no acute cardiopulmonary findings.  CBC did show some peripheral eosinophilia.  White blood cell count came back within normal range.  CMP did not show any hypercapnia.  IgE level came back at 85.  Patient did not have respiratory culture completed.    Patient states that she initially felt better after she prednisone, however patient states that over the last 2-week she has been having shortness of breath, a persistent cough, and intermittent wheezing.  Patient states that at times it is hard to cough up secretions.  Patient states that she is currently on ciprofloxacin for UTI.  Patient states that she is taking Brovana, Pulmicort, and Spiriva every day as prescribed and uses albuterol inhaler as needed.  Patient does have a history of obstructive sleep apnea and continues to decline CPAP therapy. Patient states that she is under the care of Dr. Gutierrez, rheumatologist for rheumatoid arthritis and is on methotrexate and taking daily folic acid.     Patient denies any recent travel.  Patient denies any known exposure any ill  contacts.  Patient denies fever, chills, night sweats, swollen glands in the head and neck, unintentional weight loss, hemoptysis, dysphagia, chest pain, palpitations, chest tightness, abdominal pain, nausea, vomiting, and diarrhea.  Patient also denies any myalgias, changes in sense of taste and/or smell, sore throat, any other coronavirus or flu-like symptoms.  Patient denies any leg swelling, orthopnea, paroxysmal nocturnal dyspnea.  Patient is able to perform activities of daily living.        Review of Systems     Family History   Problem Relation Age of Onset    Stroke Father     Heart disease Father     Diabetes Father         Social History     Socioeconomic History    Marital status:    Tobacco Use    Smoking status: Former     Current packs/day: 0.00     Average packs/day: 0.3 packs/day for 65.0 years (16.3 ttl pk-yrs)     Types: Cigarettes     Start date:      Quit date:      Years since quittin.5     Passive exposure: Past    Smokeless tobacco: Never    Tobacco comments:     Started smoking as a teenager.   Vaping Use    Vaping status: Never Used   Substance and Sexual Activity    Alcohol use: Never    Drug use: Never    Sexual activity: Not Currently        Past Medical History:   Diagnosis Date    Arthritis     Bowel obstruction     Diabetes mellitus     Hypertension     Tinnitus         Immunization History   Administered Date(s) Administered    COVID-19 (PFIZER) BIVALENT 12+YRS 2022    COVID-19 (PFIZER) Purple Cap Monovalent 2021, 2021    Flu Vaccine Intradermal Quad 18-64YR 10/05/2022    Fluzone (or Fluarix & Flulaval for VFC) >6mos 2018    Fluzone High-Dose 65+YRS 2020    Fluzone High-Dose 65+yrs 2023    Pneumococcal Polysaccharide (PPSV23) 10/20/2003    Td (TDVAX) 2001       Allergies   Allergen Reactions    Sulfa Antibiotics Nausea And Vomiting          Current Outpatient Medications:     albuterol sulfate  (90 Base) MCG/ACT  inhaler, Inhale 2 puffs Every 4 (Four) Hours As Needed for Wheezing., Disp: 54 g, Rfl: 1    amLODIPine (NORVASC) 5 MG tablet, , Disp: , Rfl:     arformoterol (BROVANA) 15 MCG/2ML nebulizer solution, Take 2 mL by nebulization 2 (Two) Times a Day for 90 days., Disp: 360 mL, Rfl: 1    budesonide (PULMICORT) 0.5 MG/2ML nebulizer solution, Take 2 mL by nebulization 2 (Two) Times a Day for 90 days. Rinse mouth out after each use, Disp: 360 mL, Rfl: 1    ciprofloxacin (CIPRO) 250 MG tablet, Take 1 tablet by mouth 2 (Two) Times a Day. For UTI, Disp: , Rfl:     Diclofenac Sodium (Voltaren) 1 % gel gel, Apply  topically to the appropriate area as directed Every 12 (Twelve) Hours., Disp: , Rfl:     hydroxychloroquine (PLAQUENIL) 200 MG tablet, Take 1 tablet by mouth 2 (Two) Times a Day., Disp: , Rfl:     insulin glargine (LANTUS, SEMGLEE) 100 UNIT/ML injection, Inject 10 Units under the skin into the appropriate area as directed Daily., Disp: , Rfl:     losartan (COZAAR) 50 MG tablet, Take 1 tablet by mouth Daily. (Patient taking differently: Take 2 tablets by mouth Daily.), Disp: 30 tablet, Rfl: 0    metFORMIN (GLUCOPHAGE) 500 MG tablet, Take 1 tablet by mouth 2 (Two) Times a Day With Meals., Disp: , Rfl:     metoprolol tartrate (LOPRESSOR) 25 MG tablet, Take 0.5 tablets by mouth 2 (Two) Times a Day for 30 days., Disp: 30 tablet, Rfl: 0    multivitamin with minerals (CENTRUM ADULTS PO), Take 1 tablet by mouth Daily., Disp: , Rfl:     predniSONE (DELTASONE) 20 MG tablet, Take 2 tablets by mouth Daily., Disp: 14 tablet, Rfl: 0    Spiriva Respimat 2.5 MCG/ACT aerosol solution inhaler, Inhale 2 puffs Daily for 90 days., Disp: 3 each, Rfl: 1    albuterol (PROVENTIL) (2.5 MG/3ML) 0.083% nebulizer solution, Take 2.5 mg by nebulization Every 4 (Four) Hours As Needed for Wheezing for up to 30 days., Disp: 180 each, Rfl: 5    folic acid (FOLVITE) 1 MG tablet, Take 1 tablet by mouth Daily. (Patient not taking: Reported on 7/17/2025),  "Disp: , Rfl:     Levemir FlexTouch 100 UNIT/ML injection, Inject 6 Units under the skin into the appropriate area as directed Every Night. (Patient not taking: Reported on 6/16/2025), Disp: , Rfl:     methotrexate 2.5 MG tablet, Take 10 tablets by mouth 1 (One) Time Per Week. (Patient not taking: Reported on 7/17/2025), Disp: , Rfl:     Pramipexole Dihydrochloride (MIRAPEX PO), Take  by mouth 1 (One) Time Per Week. (Patient not taking: Reported on 5/20/2025), Disp: , Rfl:     sodium chloride 3 % nebulizer solution, Take 4 mL by nebulization 2 (Two) Times a Day for 30 days., Disp: 240 mL, Rfl: 5     Objective     Physical Exam  Vital Signs:   WDWN, Alert, NAD.    HEENT:  PERRL, EOMI.  OP, nares clear, no sinus tenderness  Neck:  Supple, no JVD, no thyromegaly.  Lymph: no axillary, cervical, supraclavicular lymphadenopathy noted bilaterally  Chest: mildly decreased breath sounds throughout with diffuse expiratory wheezes. Normal work of breathing noted. Patient is able speak full sentences without difficulty.   CV: RRR, no MGR, pulses 2+, equal.  EXT:  no clubbing, no cyanosis, no edema, no joint tenderness  Neuro:  A&Ox3, CN grossly intact, no focal deficits.  Skin: No rashes or lesions noted.    /72 (BP Location: Left arm, Patient Position: Sitting, Cuff Size: Small Adult)   Pulse 77   Temp 98 °F (36.7 °C) (Tympanic)   Resp 16   Ht 160 cm (62.99\")   Wt 54.5 kg (120 lb 3.2 oz)   SpO2 93% Comment: room air  BMI 21.30 kg/m²         Result Review :   I have reviewed my last office visit note.  I also reviewed chest x-ray report and lab results dated from 6/16/2025.  See scanned reports.    Procedures:      XR Chest 2 View  Result Date: 6/19/2025  Impression: No acute cardiopulmonary findings. Electronically Signed: Angel Luis Araya MD  6/19/2025 11:45 AM EDT  Workstation ID: [id number]        Assessment and Plan      Assessment:  1.  Asthma/COPD overlap syndrome.  Patient is on triple nebulizer/inhaler " therapy.  2.  Obstructive sleep apnea. Patient has not used CPAP machine for several years and declines using CPAP machine.  3.  Rheumatoid arthritis.  Patient is on methotrexate managed by Dr. Gutierrez, rheumatologist.   4.  Dyspnea.  5.  Cough.  6.  Wheezing.  7.  Airway clearance impairment.  8.  Tobacco abuse of cigarettes in remission.  Not eligible for lung cancer screening.        Plan:  1.  Did discuss with her that we can schedule her for bronchoscopy for further evaluation due to having a persistent cough, however patient declines procedure at this time.  Will prescribe patient a prednisone burst.  Patient is currently on Cipro for UTI.  Will not prescribe any additional antibiotics at this time.  Expectations and course of treatment discussed with the patient. How to take medications and possible side effects of medications discussed with the patient. Patient verbalized understanding and compliance. Patient is advised to monitor blood sugars closely while on prednisone. Blood sugar parameters discussed with the patient in the office today. Patient is advised to call the office and/or go to the ER if her blood sugar becomes elevated and/or remains elevated and/or develops any new or worsening symptoms.   2.  Will order a chest CT scan, CBC, CMP, IgE level, respiratory culture, and AFB culture for further evaluation.  3.  Continue Brovana, Pulmicort, and Spiriva as prescribed and rinse mouth out after each use.  4.  Will prescribe patient albuterol nebulizer treatment to take as needed.  Continue albuterol inhaler as needed.  5.  Will prescribe patient a flutter valve with sodium chloride nebulizer treatments to assist with airway clearance.  6.  Patient continues to decline a CPAP machine.  I discussed the complications of untreated sleep apnea including effects on hypertension, diabetes mellitus and nonrestorative sleep with hypersomnia which can increase risk for motor vehicle accidents.  Untreated sleep  apnea is also a risk factor for development of pulmonary hypertension, atrial fibrillation, and stroke.  Patient verbalized understanding.    7.  Methotrexate management per rheumatology.  Patient is advised to continue daily folic acid.  Patient to follow-up with rheumatologist as scheduled.  8.  Vaccination status: Patient reports they did receive 3 Covid vaccines. Patient is advised to check on the status of pneumonia vaccine with her primary care provider and to notify our office. Patient is advised to receive the new flu vaccine when it becomes available.   9.  Smoking status: Patient is a former cigarette smoker.  Not eligible for lung cancer screening.  10.  Patient to call the office, 911, or go to the ER with new or worsening symptoms.  11.  Follow-up in 4 weeks, sooner if needed.           Follow Up   Return in about 4 weeks (around 8/14/2025).  Patient was given instructions and counseling regarding her condition or for health maintenance advice. Please see specific information pulled into the AVS if appropriate.

## 2025-08-05 ENCOUNTER — HOSPITAL ENCOUNTER (OUTPATIENT)
Dept: CT IMAGING | Facility: HOSPITAL | Age: 89
Discharge: HOME | End: 2025-08-05
Admitting: NURSE PRACTITIONER
Payer: MEDICARE

## 2025-08-05 DIAGNOSIS — R06.2 WHEEZING: ICD-10-CM

## 2025-08-05 DIAGNOSIS — M06.9 RHEUMATOID ARTHRITIS, INVOLVING UNSPECIFIED SITE, UNSPECIFIED WHETHER RHEUMATOID FACTOR PRESENT: ICD-10-CM

## 2025-08-05 DIAGNOSIS — K21.9 GASTROESOPHAGEAL REFLUX DISEASE, UNSPECIFIED WHETHER ESOPHAGITIS PRESENT: ICD-10-CM

## 2025-08-05 DIAGNOSIS — R06.00 DYSPNEA, UNSPECIFIED TYPE: ICD-10-CM

## 2025-08-05 DIAGNOSIS — R05.3 PERSISTENT COUGH: ICD-10-CM

## 2025-08-05 DIAGNOSIS — G47.33 OSA (OBSTRUCTIVE SLEEP APNEA): ICD-10-CM

## 2025-08-05 DIAGNOSIS — F17.201 TOBACCO ABUSE, IN REMISSION: ICD-10-CM

## 2025-08-05 DIAGNOSIS — J44.89 ASTHMA-COPD OVERLAP SYNDROME: ICD-10-CM

## 2025-08-05 PROCEDURE — 71250 CT THORAX DX C-: CPT

## 2025-08-08 ENCOUNTER — LAB (OUTPATIENT)
Dept: LAB | Facility: HOSPITAL | Age: 89
End: 2025-08-08
Payer: MEDICARE

## 2025-08-08 DIAGNOSIS — J44.89 ASTHMA-COPD OVERLAP SYNDROME: ICD-10-CM

## 2025-08-08 DIAGNOSIS — K21.9 GASTROESOPHAGEAL REFLUX DISEASE, UNSPECIFIED WHETHER ESOPHAGITIS PRESENT: ICD-10-CM

## 2025-08-08 DIAGNOSIS — R06.00 DYSPNEA, UNSPECIFIED TYPE: ICD-10-CM

## 2025-08-08 DIAGNOSIS — F17.201 TOBACCO ABUSE, IN REMISSION: ICD-10-CM

## 2025-08-08 DIAGNOSIS — M06.9 RHEUMATOID ARTHRITIS, INVOLVING UNSPECIFIED SITE, UNSPECIFIED WHETHER RHEUMATOID FACTOR PRESENT: ICD-10-CM

## 2025-08-08 DIAGNOSIS — R06.2 WHEEZING: ICD-10-CM

## 2025-08-08 DIAGNOSIS — G47.33 OSA (OBSTRUCTIVE SLEEP APNEA): ICD-10-CM

## 2025-08-08 DIAGNOSIS — R05.3 PERSISTENT COUGH: ICD-10-CM

## 2025-08-08 LAB
ALBUMIN SERPL-MCNC: 4.3 G/DL (ref 3.5–5.2)
ALBUMIN/GLOB SERPL: 1.7 G/DL
ALP SERPL-CCNC: 65 U/L (ref 39–117)
ALT SERPL W P-5'-P-CCNC: 10 U/L (ref 1–33)
ANION GAP SERPL CALCULATED.3IONS-SCNC: 9.4 MMOL/L (ref 5–15)
AST SERPL-CCNC: 15 U/L (ref 1–32)
BASOPHILS # BLD AUTO: 0.04 10*3/MM3 (ref 0–0.2)
BASOPHILS NFR BLD AUTO: 0.9 % (ref 0–1.5)
BILIRUB SERPL-MCNC: 0.5 MG/DL (ref 0–1.2)
BUN SERPL-MCNC: 12 MG/DL (ref 8–23)
BUN/CREAT SERPL: 16 (ref 7–25)
CALCIUM SPEC-SCNC: 9.4 MG/DL (ref 8.6–10.5)
CHLORIDE SERPL-SCNC: 103 MMOL/L (ref 98–107)
CO2 SERPL-SCNC: 26.6 MMOL/L (ref 22–29)
CREAT SERPL-MCNC: 0.75 MG/DL (ref 0.57–1)
DEPRECATED RDW RBC AUTO: 50.1 FL (ref 37–54)
EGFRCR SERPLBLD CKD-EPI 2021: 76.7 ML/MIN/1.73
EOSINOPHIL # BLD AUTO: 0.45 10*3/MM3 (ref 0–0.4)
EOSINOPHIL NFR BLD AUTO: 9.7 % (ref 0.3–6.2)
ERYTHROCYTE [DISTWIDTH] IN BLOOD BY AUTOMATED COUNT: 14.7 % (ref 12.3–15.4)
GLOBULIN UR ELPH-MCNC: 2.6 GM/DL
GLUCOSE SERPL-MCNC: 100 MG/DL (ref 65–99)
HCT VFR BLD AUTO: 36.8 % (ref 34–46.6)
HGB BLD-MCNC: 11.6 G/DL (ref 12–15.9)
IMM GRANULOCYTES # BLD AUTO: 0.01 10*3/MM3 (ref 0–0.05)
IMM GRANULOCYTES NFR BLD AUTO: 0.2 % (ref 0–0.5)
LYMPHOCYTES # BLD AUTO: 1.35 10*3/MM3 (ref 0.7–3.1)
LYMPHOCYTES NFR BLD AUTO: 29.2 % (ref 19.6–45.3)
MCH RBC QN AUTO: 29.3 PG (ref 26.6–33)
MCHC RBC AUTO-ENTMCNC: 31.5 G/DL (ref 31.5–35.7)
MCV RBC AUTO: 92.9 FL (ref 79–97)
MONOCYTES # BLD AUTO: 0.48 10*3/MM3 (ref 0.1–0.9)
MONOCYTES NFR BLD AUTO: 10.4 % (ref 5–12)
NEUTROPHILS NFR BLD AUTO: 2.3 10*3/MM3 (ref 1.7–7)
NEUTROPHILS NFR BLD AUTO: 49.6 % (ref 42.7–76)
NRBC BLD AUTO-RTO: 0 /100 WBC (ref 0–0.2)
PLATELET # BLD AUTO: 207 10*3/MM3 (ref 140–450)
PMV BLD AUTO: 10.4 FL (ref 6–12)
POTASSIUM SERPL-SCNC: 4.2 MMOL/L (ref 3.5–5.2)
PROT SERPL-MCNC: 6.9 G/DL (ref 6–8.5)
RBC # BLD AUTO: 3.96 10*6/MM3 (ref 3.77–5.28)
SODIUM SERPL-SCNC: 139 MMOL/L (ref 136–145)
WBC NRBC COR # BLD AUTO: 4.63 10*3/MM3 (ref 3.4–10.8)

## 2025-08-08 PROCEDURE — 80053 COMPREHEN METABOLIC PANEL: CPT

## 2025-08-08 PROCEDURE — 85025 COMPLETE CBC W/AUTO DIFF WBC: CPT

## 2025-08-08 PROCEDURE — 82785 ASSAY OF IGE: CPT

## 2025-08-08 PROCEDURE — 36415 COLL VENOUS BLD VENIPUNCTURE: CPT

## 2025-08-13 LAB — IGE SERPL-ACNC: 69 IU/ML (ref 6–495)

## 2025-08-14 ENCOUNTER — OFFICE VISIT (OUTPATIENT)
Dept: PULMONOLOGY | Facility: CLINIC | Age: 89
End: 2025-08-14
Payer: MEDICARE

## 2025-08-14 ENCOUNTER — TELEPHONE (OUTPATIENT)
Dept: PULMONOLOGY | Facility: CLINIC | Age: 89
End: 2025-08-14

## 2025-08-14 ENCOUNTER — LAB (OUTPATIENT)
Facility: HOSPITAL | Age: 89
End: 2025-08-14
Payer: MEDICARE

## 2025-08-14 VITALS
SYSTOLIC BLOOD PRESSURE: 159 MMHG | OXYGEN SATURATION: 98 % | BODY MASS INDEX: 21.44 KG/M2 | TEMPERATURE: 97.8 F | RESPIRATION RATE: 16 BRPM | WEIGHT: 121 LBS | HEIGHT: 63 IN | HEART RATE: 77 BPM | DIASTOLIC BLOOD PRESSURE: 81 MMHG

## 2025-08-14 DIAGNOSIS — G47.33 OSA (OBSTRUCTIVE SLEEP APNEA): ICD-10-CM

## 2025-08-14 DIAGNOSIS — J47.9 BRONCHIECTASIS WITHOUT COMPLICATION: ICD-10-CM

## 2025-08-14 DIAGNOSIS — R06.2 WHEEZING: ICD-10-CM

## 2025-08-14 DIAGNOSIS — M06.9 RHEUMATOID ARTHRITIS, INVOLVING UNSPECIFIED SITE, UNSPECIFIED WHETHER RHEUMATOID FACTOR PRESENT: ICD-10-CM

## 2025-08-14 DIAGNOSIS — J44.89 ASTHMA-COPD OVERLAP SYNDROME: Primary | ICD-10-CM

## 2025-08-14 DIAGNOSIS — R06.00 DYSPNEA, UNSPECIFIED TYPE: ICD-10-CM

## 2025-08-14 DIAGNOSIS — R05.9 COUGH, UNSPECIFIED TYPE: ICD-10-CM

## 2025-08-14 DIAGNOSIS — D72.19 PERIPHERAL EOSINOPHILIA: ICD-10-CM

## 2025-08-14 DIAGNOSIS — J44.89 ASTHMA-COPD OVERLAP SYNDROME: ICD-10-CM

## 2025-08-14 DIAGNOSIS — R06.89 AIRWAY CLEARANCE IMPAIRMENT: ICD-10-CM

## 2025-08-14 DIAGNOSIS — F17.201 TOBACCO ABUSE, IN REMISSION: ICD-10-CM

## 2025-08-14 LAB
CRP SERPL-MCNC: <0.3 MG/DL (ref 0–0.5)
ERYTHROCYTE [SEDIMENTATION RATE] IN BLOOD: 10 MM/HR (ref 0–30)

## 2025-08-14 PROCEDURE — 86038 ANTINUCLEAR ANTIBODIES: CPT

## 2025-08-14 PROCEDURE — 85652 RBC SED RATE AUTOMATED: CPT

## 2025-08-14 PROCEDURE — 86037 ANCA TITER EACH ANTIBODY: CPT

## 2025-08-14 PROCEDURE — 83516 IMMUNOASSAY NONANTIBODY: CPT

## 2025-08-14 PROCEDURE — 36415 COLL VENOUS BLD VENIPUNCTURE: CPT

## 2025-08-14 PROCEDURE — 86140 C-REACTIVE PROTEIN: CPT

## 2025-08-14 RX ORDER — BLOOD SUGAR DIAGNOSTIC
STRIP MISCELLANEOUS
COMMUNITY
Start: 2025-07-17

## 2025-08-14 RX ORDER — TIOTROPIUM BROMIDE INHALATION SPRAY 3.12 UG/1
2 SPRAY, METERED RESPIRATORY (INHALATION)
Qty: 3 EACH | Refills: 1 | Status: SHIPPED | OUTPATIENT
Start: 2025-08-14 | End: 2025-11-12

## 2025-08-14 RX ORDER — ALBUTEROL SULFATE 90 UG/1
2 INHALANT RESPIRATORY (INHALATION) EVERY 4 HOURS PRN
Qty: 54 G | Refills: 1 | Status: SHIPPED | OUTPATIENT
Start: 2025-08-14

## 2025-08-14 RX ORDER — ACETAMINOPHEN 325 MG/1
TABLET ORAL
COMMUNITY
Start: 2025-05-27

## 2025-08-14 RX ORDER — CLOTRIMAZOLE 1 %
CREAM (GRAM) TOPICAL
COMMUNITY
Start: 2025-05-27

## 2025-08-14 RX ORDER — PEN NEEDLE, DIABETIC 32GX 5/32"
NEEDLE, DISPOSABLE MISCELLANEOUS
COMMUNITY
Start: 2025-07-31

## 2025-08-14 RX ORDER — BENZOCAINE/MENTHOL 6 MG-10 MG
LOZENGE MUCOUS MEMBRANE
COMMUNITY
Start: 2025-05-27

## 2025-08-14 RX ORDER — INSULIN GLARGINE-YFGN 100 [IU]/ML
INJECTION, SOLUTION SUBCUTANEOUS
COMMUNITY
Start: 2025-07-09

## 2025-08-14 RX ORDER — BISMUTH SUBSALICYLATE 262 MG/1
TABLET, CHEWABLE ORAL
COMMUNITY
Start: 2025-05-27

## 2025-08-15 LAB — ANA SER QL: NEGATIVE

## 2025-08-18 LAB
C-ANCA TITR SER IF: NORMAL TITER
MYELOPEROXIDASE AB SER IA-ACNC: <0.2 UNITS (ref 0–0.9)
P-ANCA ATYPICAL TITR SER IF: NORMAL TITER
P-ANCA TITR SER IF: NORMAL TITER
PROTEINASE3 AB SER IA-ACNC: <0.2 UNITS (ref 0–0.9)

## 2025-08-25 ENCOUNTER — OFFICE VISIT (OUTPATIENT)
Dept: ORTHOPEDIC SURGERY | Facility: CLINIC | Age: 89
End: 2025-08-25
Payer: MEDICARE

## 2025-08-25 VITALS
SYSTOLIC BLOOD PRESSURE: 175 MMHG | OXYGEN SATURATION: 95 % | BODY MASS INDEX: 21.26 KG/M2 | DIASTOLIC BLOOD PRESSURE: 79 MMHG | WEIGHT: 120 LBS | HEART RATE: 82 BPM | HEIGHT: 63 IN

## 2025-08-25 DIAGNOSIS — M17.11 PRIMARY OSTEOARTHRITIS OF RIGHT KNEE: Primary | ICD-10-CM

## 2025-08-25 PROCEDURE — 20610 DRAIN/INJ JOINT/BURSA W/O US: CPT | Performed by: ORTHOPAEDIC SURGERY

## 2025-08-25 RX ORDER — TRIAMCINOLONE ACETONIDE 40 MG/ML
40 INJECTION, SUSPENSION INTRA-ARTICULAR; INTRAMUSCULAR
Status: COMPLETED | OUTPATIENT
Start: 2025-08-25 | End: 2025-08-25

## 2025-08-25 RX ORDER — LIDOCAINE HYDROCHLORIDE 10 MG/ML
5 INJECTION, SOLUTION INFILTRATION; PERINEURAL
Status: COMPLETED | OUTPATIENT
Start: 2025-08-25 | End: 2025-08-25

## 2025-08-25 RX ADMIN — LIDOCAINE HYDROCHLORIDE 5 ML: 10 INJECTION, SOLUTION INFILTRATION; PERINEURAL at 15:15

## 2025-08-25 RX ADMIN — TRIAMCINOLONE ACETONIDE 40 MG: 40 INJECTION, SUSPENSION INTRA-ARTICULAR; INTRAMUSCULAR at 15:15
